# Patient Record
Sex: MALE | Race: WHITE | NOT HISPANIC OR LATINO | Employment: FULL TIME | URBAN - METROPOLITAN AREA
[De-identification: names, ages, dates, MRNs, and addresses within clinical notes are randomized per-mention and may not be internally consistent; named-entity substitution may affect disease eponyms.]

---

## 2017-02-24 ENCOUNTER — ALLSCRIPTS OFFICE VISIT (OUTPATIENT)
Dept: OTHER | Facility: OTHER | Age: 42
End: 2017-02-24

## 2017-02-24 LAB
FLUAV AG SPEC QL IA: NEGATIVE
INFLUENZA B AG (HISTORICAL): NEGATIVE

## 2017-03-07 ENCOUNTER — TRANSCRIBE ORDERS (OUTPATIENT)
Dept: LAB | Facility: CLINIC | Age: 42
End: 2017-03-07

## 2017-03-07 ENCOUNTER — APPOINTMENT (OUTPATIENT)
Dept: LAB | Facility: CLINIC | Age: 42
End: 2017-03-07
Payer: COMMERCIAL

## 2017-03-07 ENCOUNTER — ALLSCRIPTS OFFICE VISIT (OUTPATIENT)
Dept: OTHER | Facility: OTHER | Age: 42
End: 2017-03-07

## 2017-03-07 DIAGNOSIS — Z02.1 ENCOUNTER FOR PRE-EMPLOYMENT EXAMINATION: Primary | ICD-10-CM

## 2017-03-07 DIAGNOSIS — Z02.1 ENCOUNTER FOR PRE-EMPLOYMENT EXAMINATION: ICD-10-CM

## 2017-03-07 DIAGNOSIS — F31.9 BIPOLAR I DISORDER, MOST RECENT EPISODE (OR CURRENT) UNSPECIFIED: Primary | ICD-10-CM

## 2017-03-07 DIAGNOSIS — F31.9 BIPOLAR I DISORDER, MOST RECENT EPISODE (OR CURRENT) UNSPECIFIED: ICD-10-CM

## 2017-03-07 LAB
ALBUMIN SERPL BCP-MCNC: 4.1 G/DL (ref 3.5–5)
ALP SERPL-CCNC: 53 U/L (ref 46–116)
ALT SERPL W P-5'-P-CCNC: 27 U/L (ref 12–78)
ANION GAP SERPL CALCULATED.3IONS-SCNC: 6 MMOL/L (ref 4–13)
AST SERPL W P-5'-P-CCNC: 19 U/L (ref 5–45)
BASOPHILS # BLD AUTO: 0.03 THOUSANDS/ΜL (ref 0–0.1)
BASOPHILS NFR BLD AUTO: 1 % (ref 0–1)
BILIRUB SERPL-MCNC: 0.87 MG/DL (ref 0.2–1)
BUN SERPL-MCNC: 12 MG/DL (ref 5–25)
CALCIUM SERPL-MCNC: 9.3 MG/DL (ref 8.3–10.1)
CHLORIDE SERPL-SCNC: 105 MMOL/L (ref 100–108)
CO2 SERPL-SCNC: 31 MMOL/L (ref 21–32)
CREAT SERPL-MCNC: 0.96 MG/DL (ref 0.6–1.3)
EOSINOPHIL # BLD AUTO: 0.03 THOUSAND/ΜL (ref 0–0.61)
EOSINOPHIL NFR BLD AUTO: 1 % (ref 0–6)
ERYTHROCYTE [DISTWIDTH] IN BLOOD BY AUTOMATED COUNT: 13.7 % (ref 11.6–15.1)
GFR SERPL CREATININE-BSD FRML MDRD: >60 ML/MIN/1.73SQ M
GLUCOSE SERPL-MCNC: 87 MG/DL (ref 65–140)
HCT VFR BLD AUTO: 45.7 % (ref 36.5–49.3)
HGB BLD-MCNC: 15.4 G/DL (ref 12–17)
LYMPHOCYTES # BLD AUTO: 2.41 THOUSANDS/ΜL (ref 0.6–4.47)
LYMPHOCYTES NFR BLD AUTO: 52 % (ref 14–44)
MCH RBC QN AUTO: 30.6 PG (ref 26.8–34.3)
MCHC RBC AUTO-ENTMCNC: 33.7 G/DL (ref 31.4–37.4)
MCV RBC AUTO: 91 FL (ref 82–98)
MONOCYTES # BLD AUTO: 0.32 THOUSAND/ΜL (ref 0.17–1.22)
MONOCYTES NFR BLD AUTO: 7 % (ref 4–12)
NEUTROPHILS # BLD AUTO: 1.8 THOUSANDS/ΜL (ref 1.85–7.62)
NEUTS SEG NFR BLD AUTO: 39 % (ref 43–75)
NRBC BLD AUTO-RTO: 0 /100 WBCS
PLATELET # BLD AUTO: 313 THOUSANDS/UL (ref 149–390)
PMV BLD AUTO: 10.1 FL (ref 8.9–12.7)
POTASSIUM SERPL-SCNC: 4.3 MMOL/L (ref 3.5–5.3)
PROT SERPL-MCNC: 7.4 G/DL (ref 6.4–8.2)
RBC # BLD AUTO: 5.03 MILLION/UL (ref 3.88–5.62)
RUBV IGG SERPL IA-ACNC: 9.3 IU/ML
SODIUM SERPL-SCNC: 142 MMOL/L (ref 136–145)
TSH SERPL DL<=0.05 MIU/L-ACNC: 0.81 UIU/ML (ref 0.36–3.74)
WBC # BLD AUTO: 4.6 THOUSAND/UL (ref 4.31–10.16)

## 2017-03-07 PROCEDURE — 80165 DIPROPYLACETIC ACID FREE: CPT

## 2017-03-07 PROCEDURE — 86762 RUBELLA ANTIBODY: CPT

## 2017-03-07 PROCEDURE — 85025 COMPLETE CBC W/AUTO DIFF WBC: CPT

## 2017-03-07 PROCEDURE — 86706 HEP B SURFACE ANTIBODY: CPT

## 2017-03-07 PROCEDURE — 86765 RUBEOLA ANTIBODY: CPT | Performed by: FAMILY MEDICINE

## 2017-03-07 PROCEDURE — 86735 MUMPS ANTIBODY: CPT

## 2017-03-07 PROCEDURE — 86787 VARICELLA-ZOSTER ANTIBODY: CPT

## 2017-03-07 PROCEDURE — 36415 COLL VENOUS BLD VENIPUNCTURE: CPT

## 2017-03-07 PROCEDURE — 80053 COMPREHEN METABOLIC PANEL: CPT

## 2017-03-07 PROCEDURE — 84443 ASSAY THYROID STIM HORMONE: CPT

## 2017-03-08 LAB — HBV SURFACE AB SER-ACNC: 931.11 MIU/ML

## 2017-03-09 LAB
MUV IGG SER QL: NORMAL
VZV IGG SER IA-ACNC: NORMAL

## 2017-03-10 LAB — VALPROATE FREE SERPL-MCNC: 5.7 UG/ML (ref 6–22)

## 2017-03-16 LAB — MEV IGG SER QL: NORMAL

## 2017-08-31 ENCOUNTER — ALLSCRIPTS OFFICE VISIT (OUTPATIENT)
Dept: OTHER | Facility: OTHER | Age: 42
End: 2017-08-31

## 2017-08-31 DIAGNOSIS — E78.00 PURE HYPERCHOLESTEROLEMIA: ICD-10-CM

## 2017-08-31 DIAGNOSIS — I10 ESSENTIAL (PRIMARY) HYPERTENSION: ICD-10-CM

## 2018-01-13 VITALS
HEIGHT: 75 IN | WEIGHT: 215.25 LBS | SYSTOLIC BLOOD PRESSURE: 120 MMHG | OXYGEN SATURATION: 99 % | BODY MASS INDEX: 26.76 KG/M2 | DIASTOLIC BLOOD PRESSURE: 78 MMHG | HEART RATE: 60 BPM | RESPIRATION RATE: 17 BRPM | TEMPERATURE: 96.4 F

## 2018-01-13 VITALS
WEIGHT: 215 LBS | DIASTOLIC BLOOD PRESSURE: 80 MMHG | BODY MASS INDEX: 26.73 KG/M2 | SYSTOLIC BLOOD PRESSURE: 138 MMHG | OXYGEN SATURATION: 97 % | HEIGHT: 75 IN | TEMPERATURE: 99.3 F | HEART RATE: 67 BPM

## 2018-01-15 NOTE — PROGRESS NOTES
Assessment    1  Physical exam, pre-employment (V70 5) (Z02 1)   2  Screening for tuberculosis (V74 1) (Z11 1)    Plan  Cough, Fever, Myalgia    · Amoxicillin-Pot Clavulanate 875-125 MG Oral Tablet   · Oseltamivir Phosphate 75 MG Oral Capsule (Tamiflu)  Health Maintenance    · SCREEN AUDIOGRAM- POC; Status:Active - Perform Order; Requested GPO:22YAL7110;    · SNELLEN VISION- POC; Status:Active - Perform Order; Requested for:07Mar2017;   Physical exam, pre-employment    · (1) HEP B SURFACE ANTIBODY; Status:Complete;   Done: 92THJ7981 12:47PM   · (1) MUMPS  ANTIBODIES, IGG; Status:Complete;   Done: 43DBC8256 12:47PM   · (1) RUBELLA IMMUNITY; Status:Complete;   Done: 32YPZ5422 12:47PM   · (1) VARICELLA ZOSTER IMMUNITY(IGG); Status:Complete;   Done: 06GIF1980  12:47PM   · (LC) Measles/Mumps/Rubella Immunity; Status:Active; Requested for:07Mar2017;    · (Q) MEASLES ANTIBODY (IGG); Status:Active; Requested for:07Mar2017;   Screening for tuberculosis    · PPD    Discussion/Summary  Impression: health maintenance visit  Currently, he eats a healthy diet and has an adequate exercise regimen  Prostate cancer screening: PSA is not indicated  Testicular cancer screening: monthly self testicular exam was advised  Colorectal cancer screening: colorectal cancer screening is not indicated  Patient discussion: discussed with the patient  Chief Complaint  PT is here for a work physical       History of Present Illness  HM, Adult Male: The patient is being seen for a health maintenance evaluation  General Health: He denies vision problems  He denies hearing loss  Lifestyle:  He consumes a diverse and healthy diet  He does not have any weight concerns  He exercises regularly  He does not use tobacco  He consumes alcohol  He reports being in recovery from alcohol abuse  He uses illicit drugs  He has previously used illicit drugs  Drug(s) abuse includes oral opioids     Screening:   HPI: 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 , leaves this weekend, so needs titers drawn ASA, "all my immunization records are at marital home and my ex won't let me get the records"  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 [facility]      Review of Systems    Constitutional: No fever or chills, feels well, no tiredness, no recent weight gain or weight loss  Eyes: No complaints of eye pain, no red eyes, no discharge from eyes, no itchy eyes  ENT: no complaints of earache, no hearing loss, no nosebleeds, no nasal discharge, no sore throat, no hoarseness  Cardiovascular: No complaints of slow heart rate, no fast heart rate, no chest pain, no palpitations, no leg claudication, no lower extremity  Respiratory: No complaints of shortness of breath, no wheezing, no cough, no SOB on exertion, no orthopnea or PND  Gastrointestinal: No complaints of abdominal pain, no constipation, no nausea or vomiting, no diarrhea or bloody stools  Genitourinary: No complaints of dysuria, no incontinence, no hesitancy, no nocturia, no genital lesion, no testicular pain  Musculoskeletal: No complaints of arthralgia, no myalgias, no joint swelling or stiffness, no limb pain or swelling  Integumentary: No complaints of skin rash or skin lesions, no itching, no skin wound, no dry skin  Neurological: No compliants of headache, no confusion, no convulsions, no numbness or tingling, no dizziness or fainting, no limb weakness, no difficulty walking  Psychiatric: Is not suicidal, no sleep disturbances, no anxiety or depression, no change in personality, no emotional problems  Endocrine: No complaints of proptosis, no hot flashes, no muscle weakness, no erectile dysfunction, no deepening of the voice, no feelings of weakness  Hematologic/Lymphatic: No complaints of swollen glands, no swollen glands in the neck, does not bleed easily, no easy bruising  Active Problems    1   Alcoholism in recovery (303 90) (F10 20)   2  Anxiety (300 00) (F41 9)   3  Asthma, currently inactive (493 90) (J45 909)   4  Bipolar disorder (296 80) (F31 9)   5  Cough (786 2) (R05)   6  Depression (311) (F32 9)   7  Essential hypertension (401 9) (I10)   8  Fatigue (780 79) (R53 83)   9  Fever (780 60) (R50 9)   10  Gastroesophageal reflux disease (530 81) (K21 9)   11  Hypercholesterolemia (272 0) (E78 00)   12  Insomnia (780 52) (G47 00)   13  Myalgia (729 1) (M79 1)   14  Opioid dependence on agonist therapy (304 00) (F11 20)   15   PTSD (post-traumatic stress disorder) (309 81) (F43 10)    Past Medical History    · History of Cough (786 2) (R05)   · History of Hand fracture (815 00) (S62 90XA)   · History of Heavy alcohol consumption (305 00) (Z72 89)   · History of chicken pox (V12 09) (Z86 19)   · History of fracture of foot (V15 51) (Z87 81)   · History of low back pain (V13 59) (Z87 39)   · History of viral meningitis (V12 42) (Z86 61)   · History of Memory loss (780 93) (R41 3)    Surgical History    · History of Knee Arthroscopy (Therapeutic)    Family History  Mother    · Family history of hypertension (V17 49) (Z82 49)  Father    · Family history of Rectal carcinoma  Maternal Grandmother    · Family history of type 2 diabetes mellitus (V18 0) (Z83 3)  Maternal Grandfather    · Family history of type 2 diabetes mellitus (V18 0) (Z83 3)  Paternal Grandfather    · Family history of malignant neoplasm of prostate (V16 42) (Z80 45)  Family History    · Family history of arthritis (V17 7) (Z82 61)   · Family history of cardiac disorder (V17 49) (Z82 49)   · Family history of diabetes mellitus (V18 0) (Z83 3)   · Family history of hypertension (V17 49) (Z82 49)   · Family history of malignant neoplasm (V16 9) (Z80 9)   · Family history of mental disorder (V17 0) (Z81 8)   · Family history of High cholesterol    Social History    · Alcoholism in recovery (303 90) (F10 20)   · Alcoholism in recovery (303 90) (F10 20)   · Always uses seat belt   · Caffeine use (V49 89) (F15 90)   · History of Currently in treatment for drug use (305 90) (F19 90)   · Former consumption of alcohol (V11 3) (M06 352)   · Never smoked    Current Meds   1  Ambien 5 MG Oral Tablet; TAKE 1 TABLET Bedtime PRN; Therapy: (Recorded:18Nov2016) to Recorded   2  Amoxicillin-Pot Clavulanate 875-125 MG Oral Tablet; take one tablet twice daily for 10   days; Therapy: 75YKZ0874 to (Last Rx:24Feb2017)  Requested for: 24Feb2017 Ordered   3  Claritin 10 MG Oral Capsule; TAKE ONE TABLET EVERY DAY; Therapy: (Recorded:18Nov2016) to Recorded   4  Divalproex Sodium  MG Oral Tablet Extended Release 24 Hour; Therapy: 23Feb2017 to Recorded   5  Flonase 50 MCG/ACT SUSP; USE 2 SPRAYS IN EACH NOSTRIL TWICE DAILY; Therapy: (Recorded:18Nov2016) to Recorded   6  Gabapentin 800 MG Oral Tablet; TAKE 1 TABLET 3 TIMES DAILY; Therapy: (Recorded:18Nov2016) to Recorded   7  Lipitor 20 MG Oral Tablet; Take 1 tablet daily; Therapy: (Recorded:18Nov2016) to Recorded   8  Losartan Potassium 25 MG Oral Tablet; Take 1 tablet daily  Requested for: 24Feb2017;   Last Rx:24Feb2017 Ordered   9  Multiple Vitamins TABS; Therapy: (Recorded:18Nov2016) to Recorded   10  Naproxen 500 MG Oral Tablet; TAKE 1 TABLET 3 TIMES DAILY AS NEEDED; Therapy: (Recorded:18Nov2016) to Recorded   11  Niacin TABS; Therapy: (Recorded:18Nov2016) to Recorded   12  Omeprazole 20 MG Oral Capsule Delayed Release; TAKE 1 CAPSULE TWICE DAILY; Therapy: (Recorded:18Nov2016) to Recorded   13  Oseltamivir Phosphate 75 MG Oral Capsule; TAKE 1 CAPSULE TWICE DAILY; Therapy: 83LJT7353 to (Evaluate:01Mar2017)  Requested for: 24Feb2017; Last    Rx:24Feb2017 Ordered   14  ProAir  (90 Base) MCG/ACT Inhalation Aerosol Solution; INHALE 1 PUFF    EVERY 4 HOURS AS NEEDED; Therapy: 17CTJ1560 to (Last Rx:24Feb2017)  Requested for: 24Feb2017 Ordered   15  Zinc 40 MG TABS;     Therapy: (Recorded:18Nov2016) to Recorded    Allergies    1  No Known Drug Allergies    2  Animal dander - Cats   3  Seasonal    Vitals   Recorded: 20RCH4867 10:06AM   Temperature 96 4 F   Heart Rate 60   Respiration 17   Systolic 370   Diastolic 78   Height 6 ft 3 in   Weight 215 lb 4 oz   BMI Calculated 26 9   BSA Calculated 2 26   O2 Saturation 99     Physical Exam    Constitutional   General appearance: No acute distress, well appearing and well nourished  Head and Face   Head and face: Normal     Palpation of the face and sinuses: No sinus tenderness  Eyes   Conjunctiva and lids: No erythema, swelling or discharge  Pupils and irises: Equal, round, reactive to light  Ears, Nose, Mouth, and Throat   External inspection of ears and nose: Normal     Otoscopic examination: Tympanic membranes translucent with normal light reflex  Canals patent without erythema  Hearing: Normal     Nasal mucosa, septum, and turbinates: Normal without edema or erythema  Lips, teeth, and gums: Normal, good dentition  Oropharynx: Normal with no erythema, edema, exudate or lesions  Neck   Neck: Supple, symmetric, trachea midline, no masses  Thyroid: Normal, no thyromegaly  Pulmonary   Respiratory effort: No increased work of breathing or signs of respiratory distress  Percussion of chest: Normal     Auscultation of lungs: Clear to auscultation  Cardiovascular   Auscultation of heart: Normal rate and rhythm, normal S1 and S2, no murmurs  Carotid pulses: 2+ bilaterally  Abdominal aorta: Normal     Femoral pulses: 2+ bilaterally  Pedal pulses: 2+ bilaterally  Peripheral vascular exam: Normal     Examination of extremities for edema and/or varicosities: Normal     Chest   Chest: Normal     Abdomen   Abdomen: Non-tender, no masses  Liver and spleen: No hepatomegaly or splenomegaly  Examination for hernias: No hernias appreciated  No ventral hernia was palpated  No umbilical hernia was palpated     Lymphatic   Palpation of lymph nodes in neck: No lymphadenopathy  Palpation of lymph nodes in axillae: No lymphadenopathy  Palpation of lymph nodes in other areas: No lymphadenopathy  Musculoskeletal   Gait and station: Normal     Inspection/palpation of digits and nails: Normal without clubbing or cyanosis  Inspection/palpation of joints, bones, and muscles: Normal     Range of motion: Normal     Stability: Normal     Muscle strength/tone: Normal     Skin   Skin and subcutaneous tissue: Normal without rashes or lesions  Palpation of skin and subcutaneous tissue: Normal turgor  Neurologic   Cranial nerves: Cranial nerves 2-12 intact  Cortical function: Normal mental status  Reflexes: 2+ and symmetric  Sensation: No sensory loss  Coordination: Normal finger to nose and heel to shin  Psychiatric   Judgment and insight: Normal     Orientation to person, place and time: Normal     Mood and affect: Normal   Mood and Affect: appropriate mood and sad  Results/Data  (1) VARICELLA ZOSTER IMMUNITY(IGG) 35DZT9018 12:47PM Manasa Banda Order Number: [id number]     Test Name Result Flag Reference   SANCHO ZOSTER IGG IMMUNE  IMMUNE   INTERPRETATION:    IMMUNE     - Presumed immune to VZV IgG infection  EQUIVOCAL  - Suggest repeat testing in 2-4 weeks  NON-IMMUNE - Presumed non-immune to VZV IgG infection  Procedure    Procedure: Hearing Acuity Test    Indication: Routine screeing  Audiometry:   Hearing in the right ear: 25 decibals at 500 hertz, 25 decibals at 1000 hertz, 25 decibals at 2000 hertz and 25 decibals at 4000 hertz  Hearing in the left ear: 40 decibals at 500 hertz, 40 decibals at 1000 hertz, 40 decibals at 2000 hertz and 40 decibals at 4000 hertz  Procedure: Visual Acuity Test    Indication: routine screening  Inforrmation supplied by att a Snellen chart     Results: 20/25 in the right eye without corrective device, 20/25 in the left eye without corrective device normal in both eyes        Signatures   Electronically signed by : Sruthi Terrell DO; Mar 15 2017  8:38AM EST                       (Author)

## 2018-02-15 DIAGNOSIS — J30.9 CHRONIC ALLERGIC RHINITIS, UNSPECIFIED SEASONALITY, UNSPECIFIED TRIGGER: Primary | ICD-10-CM

## 2018-02-15 RX ORDER — FLUTICASONE PROPIONATE 50 MCG
2 SPRAY, SUSPENSION (ML) NASAL DAILY
Qty: 16 G | Refills: 0 | Status: SHIPPED | OUTPATIENT
Start: 2018-02-15 | End: 2018-03-09 | Stop reason: SDUPTHER

## 2018-02-15 RX ORDER — FLUTICASONE PROPIONATE 50 MCG
2 SPRAY, SUSPENSION (ML) NASAL DAILY
COMMUNITY
End: 2018-02-15 | Stop reason: SDUPTHER

## 2018-03-08 RX ORDER — MULTIVITAMIN
TABLET ORAL
COMMUNITY

## 2018-03-08 RX ORDER — OMEPRAZOLE 20 MG/1
1 CAPSULE, DELAYED RELEASE ORAL
COMMUNITY
End: 2018-06-12 | Stop reason: SDUPTHER

## 2018-03-08 RX ORDER — NAPROXEN 500 MG/1
1 TABLET ORAL 3 TIMES DAILY PRN
COMMUNITY

## 2018-03-08 RX ORDER — GABAPENTIN 800 MG/1
1 TABLET ORAL 3 TIMES DAILY
COMMUNITY

## 2018-03-08 RX ORDER — ALBUTEROL SULFATE 90 UG/1
1 AEROSOL, METERED RESPIRATORY (INHALATION) EVERY 4 HOURS PRN
COMMUNITY
Start: 2016-11-18 | End: 2018-03-09 | Stop reason: SDUPTHER

## 2018-03-08 RX ORDER — DIVALPROEX SODIUM 500 MG/1
TABLET, EXTENDED RELEASE ORAL
COMMUNITY
Start: 2017-02-23

## 2018-03-08 RX ORDER — ATORVASTATIN CALCIUM 20 MG/1
1 TABLET, FILM COATED ORAL DAILY
COMMUNITY
Start: 2017-08-06

## 2018-03-08 RX ORDER — LOSARTAN POTASSIUM 25 MG/1
1 TABLET ORAL DAILY
COMMUNITY
Start: 2017-08-06

## 2018-03-08 RX ORDER — ZOLPIDEM TARTRATE 5 MG/1
1 TABLET ORAL
COMMUNITY
End: 2018-03-09 | Stop reason: SDUPTHER

## 2018-03-08 RX ORDER — NIACIN 100 MG
TABLET ORAL
COMMUNITY

## 2018-03-08 RX ORDER — LORATADINE 10 MG/1
1 CAPSULE, LIQUID FILLED ORAL DAILY
COMMUNITY
End: 2018-03-09 | Stop reason: SDUPTHER

## 2018-03-09 ENCOUNTER — APPOINTMENT (OUTPATIENT)
Dept: LAB | Facility: CLINIC | Age: 43
End: 2018-03-09
Payer: COMMERCIAL

## 2018-03-09 ENCOUNTER — TRANSCRIBE ORDERS (OUTPATIENT)
Dept: LAB | Facility: CLINIC | Age: 43
End: 2018-03-09

## 2018-03-09 ENCOUNTER — OFFICE VISIT (OUTPATIENT)
Dept: FAMILY MEDICINE CLINIC | Facility: CLINIC | Age: 43
End: 2018-03-09
Payer: COMMERCIAL

## 2018-03-09 VITALS
HEART RATE: 66 BPM | OXYGEN SATURATION: 98 % | BODY MASS INDEX: 27.38 KG/M2 | WEIGHT: 224.8 LBS | HEIGHT: 76 IN | SYSTOLIC BLOOD PRESSURE: 106 MMHG | DIASTOLIC BLOOD PRESSURE: 74 MMHG | TEMPERATURE: 98.3 F

## 2018-03-09 DIAGNOSIS — E78.00 PURE HYPERCHOLESTEROLEMIA: ICD-10-CM

## 2018-03-09 DIAGNOSIS — I10 ESSENTIAL (PRIMARY) HYPERTENSION: ICD-10-CM

## 2018-03-09 DIAGNOSIS — G47.00 INSOMNIA, UNSPECIFIED TYPE: Primary | ICD-10-CM

## 2018-03-09 DIAGNOSIS — J30.9 CHRONIC ALLERGIC RHINITIS, UNSPECIFIED SEASONALITY, UNSPECIFIED TRIGGER: ICD-10-CM

## 2018-03-09 DIAGNOSIS — J45.21 MILD INTERMITTENT ASTHMATIC BRONCHITIS WITH ACUTE EXACERBATION: ICD-10-CM

## 2018-03-09 LAB
ALBUMIN SERPL BCP-MCNC: 3.8 G/DL (ref 3.5–5)
ALP SERPL-CCNC: 47 U/L (ref 46–116)
ALT SERPL W P-5'-P-CCNC: 16 U/L (ref 12–78)
ANION GAP SERPL CALCULATED.3IONS-SCNC: 6 MMOL/L (ref 4–13)
AST SERPL W P-5'-P-CCNC: 16 U/L (ref 5–45)
BASOPHILS # BLD AUTO: 0.01 THOUSANDS/ΜL (ref 0–0.1)
BASOPHILS NFR BLD AUTO: 0 % (ref 0–1)
BILIRUB SERPL-MCNC: 0.82 MG/DL (ref 0.2–1)
BUN SERPL-MCNC: 10 MG/DL (ref 5–25)
CALCIUM SERPL-MCNC: 9.2 MG/DL (ref 8.3–10.1)
CHLORIDE SERPL-SCNC: 104 MMOL/L (ref 100–108)
CHOLEST SERPL-MCNC: 173 MG/DL (ref 50–200)
CO2 SERPL-SCNC: 29 MMOL/L (ref 21–32)
CREAT SERPL-MCNC: 0.82 MG/DL (ref 0.6–1.3)
CREAT UR-MCNC: 190 MG/DL
EOSINOPHIL # BLD AUTO: 0.05 THOUSAND/ΜL (ref 0–0.61)
EOSINOPHIL NFR BLD AUTO: 1 % (ref 0–6)
ERYTHROCYTE [DISTWIDTH] IN BLOOD BY AUTOMATED COUNT: 12.5 % (ref 11.6–15.1)
GFR SERPL CREATININE-BSD FRML MDRD: 109 ML/MIN/1.73SQ M
GLUCOSE P FAST SERPL-MCNC: 99 MG/DL (ref 65–99)
HCT VFR BLD AUTO: 45.1 % (ref 36.5–49.3)
HDLC SERPL-MCNC: 47 MG/DL (ref 40–60)
HGB BLD-MCNC: 16 G/DL (ref 12–17)
LDLC SERPL CALC-MCNC: 113 MG/DL (ref 0–100)
LYMPHOCYTES # BLD AUTO: 2.21 THOUSANDS/ΜL (ref 0.6–4.47)
LYMPHOCYTES NFR BLD AUTO: 46 % (ref 14–44)
MCH RBC QN AUTO: 31.3 PG (ref 26.8–34.3)
MCHC RBC AUTO-ENTMCNC: 35.5 G/DL (ref 31.4–37.4)
MCV RBC AUTO: 88 FL (ref 82–98)
MICROALBUMIN UR-MCNC: 10.3 MG/L (ref 0–20)
MICROALBUMIN/CREAT 24H UR: 5 MG/G CREATININE (ref 0–30)
MONOCYTES # BLD AUTO: 0.3 THOUSAND/ΜL (ref 0.17–1.22)
MONOCYTES NFR BLD AUTO: 6 % (ref 4–12)
NEUTROPHILS # BLD AUTO: 2.22 THOUSANDS/ΜL (ref 1.85–7.62)
NEUTS SEG NFR BLD AUTO: 47 % (ref 43–75)
NRBC BLD AUTO-RTO: 0 /100 WBCS
PLATELET # BLD AUTO: 281 THOUSANDS/UL (ref 149–390)
PMV BLD AUTO: 9.9 FL (ref 8.9–12.7)
POTASSIUM SERPL-SCNC: 3.7 MMOL/L (ref 3.5–5.3)
PROT SERPL-MCNC: 7.5 G/DL (ref 6.4–8.2)
RBC # BLD AUTO: 5.11 MILLION/UL (ref 3.88–5.62)
SODIUM SERPL-SCNC: 139 MMOL/L (ref 136–145)
T4 FREE SERPL-MCNC: 1.02 NG/DL (ref 0.76–1.46)
TRIGL SERPL-MCNC: 67 MG/DL
TSH SERPL DL<=0.05 MIU/L-ACNC: 0.22 UIU/ML (ref 0.36–3.74)
WBC # BLD AUTO: 4.8 THOUSAND/UL (ref 4.31–10.16)

## 2018-03-09 PROCEDURE — 99214 OFFICE O/P EST MOD 30 MIN: CPT | Performed by: FAMILY MEDICINE

## 2018-03-09 PROCEDURE — 80053 COMPREHEN METABOLIC PANEL: CPT

## 2018-03-09 PROCEDURE — 84443 ASSAY THYROID STIM HORMONE: CPT

## 2018-03-09 PROCEDURE — 84439 ASSAY OF FREE THYROXINE: CPT

## 2018-03-09 PROCEDURE — 80061 LIPID PANEL: CPT

## 2018-03-09 PROCEDURE — 1036F TOBACCO NON-USER: CPT | Performed by: FAMILY MEDICINE

## 2018-03-09 PROCEDURE — 36415 COLL VENOUS BLD VENIPUNCTURE: CPT

## 2018-03-09 PROCEDURE — 85025 COMPLETE CBC W/AUTO DIFF WBC: CPT

## 2018-03-09 PROCEDURE — 3008F BODY MASS INDEX DOCD: CPT | Performed by: FAMILY MEDICINE

## 2018-03-09 PROCEDURE — 82043 UR ALBUMIN QUANTITATIVE: CPT

## 2018-03-09 PROCEDURE — 82570 ASSAY OF URINE CREATININE: CPT

## 2018-03-09 RX ORDER — AZITHROMYCIN 250 MG/1
TABLET, FILM COATED ORAL
Qty: 6 TABLET | Refills: 0 | Status: SHIPPED | OUTPATIENT
Start: 2018-03-09 | End: 2018-03-13

## 2018-03-09 RX ORDER — ALBUTEROL SULFATE 90 UG/1
1 AEROSOL, METERED RESPIRATORY (INHALATION) EVERY 4 HOURS PRN
Qty: 1 INHALER | Refills: 0 | Status: SHIPPED | OUTPATIENT
Start: 2018-03-09

## 2018-03-09 RX ORDER — ZOLPIDEM TARTRATE 5 MG/1
5 TABLET ORAL
Qty: 30 TABLET | Refills: 0 | Status: SHIPPED | OUTPATIENT
Start: 2018-03-09 | End: 2018-06-12 | Stop reason: SDUPTHER

## 2018-03-09 RX ORDER — FLUTICASONE PROPIONATE 50 MCG
2 SPRAY, SUSPENSION (ML) NASAL DAILY
Qty: 16 G | Refills: 2 | Status: SHIPPED | OUTPATIENT
Start: 2018-03-09 | End: 2018-06-12 | Stop reason: SDUPTHER

## 2018-03-09 RX ORDER — LORATADINE 10 MG/1
10 CAPSULE, LIQUID FILLED ORAL DAILY
Qty: 90 CAPSULE | Refills: 0 | Status: SHIPPED | OUTPATIENT
Start: 2018-03-09

## 2018-03-09 NOTE — PROGRESS NOTES
Assessment/Plan:         Diagnoses and all orders for this visit:    Insomnia, unspecified type  -     zolpidem (AMBIEN) 5 mg tablet; Take 1 tablet (5 mg total) by mouth daily at bedtime as needed for sleep    Chronic allergic rhinitis, unspecified seasonality, unspecified trigger  -     Loratadine (CLARITIN) 10 MG CAPS; Take 1 capsule (10 mg total) by mouth daily  -     fluticasone (FLONASE) 50 mcg/act nasal spray; 2 sprays into each nostril daily    Mild intermittent asthmatic bronchitis with acute exacerbation  -     albuterol (PROAIR HFA) 90 mcg/act inhaler; Inhale 1 puff every 4 (four) hours as needed for wheezing or shortness of breath  -     azithromycin (ZITHROMAX) 250 mg tablet; 2 PO day 1, then 1 PO qd x 4 days  -     XR chest pa & lateral; Future          Subjective:   Chief Complaint   Patient presents with    Headache     symptoms started about 2 weeks ago    Nasal Congestion    Cough    Diarrhea    Flank Pain    Generalized Body Aches        Patient ID: Jose Alberto Arce is a 43 y o  male  Per c/c reviewed by me  Used cold-eez at first, then theraflu, nothing working, pushing 2 weeks now, and getting worse, not better  stayed home today from work due to [employer]  Possible ill contacts- his children  Admits feverishness  Did not get a flu shot this year, usually does  Admits with cough, "sometimes nothing produced, sometimes brown chunks, can hear it gurgling in my chest, also pain in vb/l flanks since ill, haven't been drinking much water, maybe that's it"  Used to have inhaler at home, has not needed in a while, so doesn't have one  States doesn't have health insurance, so had to stop his other meds        The following portions of the patient's history were reviewed and updated as appropriate: allergies, current medications, past family history, past medical history, past social history, past surgical history and problem list     Review of Systems   Constitutional: Positive for fatigue     Eyes: Negative  Respiratory: Negative for choking, chest tightness, shortness of breath, wheezing and stridor  Cardiovascular: Negative  Genitourinary: Negative  Skin: Negative  Neurological: Negative for dizziness, tremors, syncope, facial asymmetry, speech difficulty, weakness, light-headedness and numbness  Hematological: Negative  Objective:      /74 (BP Location: Left arm, Patient Position: Sitting, Cuff Size: Large)   Pulse 66   Temp 98 3 °F (36 8 °C) (Tympanic)   Ht 6' 4" (1 93 m)   Wt 102 kg (224 lb 12 8 oz)   SpO2 98%   BMI 27 36 kg/m²          Physical Exam   Constitutional: He appears well-developed and well-nourished  He is cooperative  Non-toxic appearance  He does not have a sickly appearance  He does not appear ill  No distress  HENT:   Head: Normocephalic and atraumatic  Right Ear: Tympanic membrane, external ear and ear canal normal    Left Ear: Tympanic membrane, external ear and ear canal normal    Nose: Rhinorrhea present  No sinus tenderness  Right sinus exhibits no maxillary sinus tenderness and no frontal sinus tenderness  Left sinus exhibits no maxillary sinus tenderness and no frontal sinus tenderness  Mouth/Throat: Uvula is midline, oropharynx is clear and moist and mucous membranes are normal    Neck: Trachea normal  Neck supple  No thyroid mass and no thyromegaly present  Cardiovascular: Normal rate, regular rhythm, normal heart sounds and normal pulses  Pulmonary/Chest: Effort normal and breath sounds normal    Lymphadenopathy:     He has cervical adenopathy  Right cervical: No superficial cervical adenopathy present  Left cervical: Superficial cervical adenopathy present  Neurological: He is alert  Skin: Skin is warm  He is not diaphoretic  Nursing note and vitals reviewed

## 2018-03-15 DIAGNOSIS — E05.90 HYPERTHYROIDISM: Primary | ICD-10-CM

## 2018-06-12 DIAGNOSIS — K21.9 GASTROESOPHAGEAL REFLUX DISEASE, ESOPHAGITIS PRESENCE NOT SPECIFIED: ICD-10-CM

## 2018-06-12 DIAGNOSIS — G47.00 INSOMNIA, UNSPECIFIED TYPE: ICD-10-CM

## 2018-06-12 DIAGNOSIS — J30.9 ALLERGIC RHINITIS, UNSPECIFIED SEASONALITY, UNSPECIFIED TRIGGER: Primary | ICD-10-CM

## 2018-06-12 NOTE — TELEPHONE ENCOUNTER
Patient left a message requesting refills of flonase, prevacid, ambien  No pharmacy given  He asks for these refills for one month, until he gets in with his new doctor in [place], who will then take over, he states  He also stated that he is moving to [place] and has requested that his medical records be sent to Dr Ruiz Miner, [address] [place] [address],[address], [place], [place]  Phone: [phone]  Fax: [phone]  I will sent the last office note plus medication list via fax and speak with the patient about a medical release being signed for all other records  Thank you

## 2018-06-12 NOTE — TELEPHONE ENCOUNTER
Left message for the patient advising about needing medical records release, asking for his new address to update our records, and to confirm pharmacy being [facility] in [facility] for his refills

## 2018-06-12 NOTE — TELEPHONE ENCOUNTER
Patient called back requesting his prescriptions go to [facility], [address] [place] [address], [address], [address]  Phone: [phone]  Thank you  His first appointment with Dr Maury Rodriguez is 6/25/2018

## 2018-06-15 RX ORDER — FLUTICASONE PROPIONATE 50 MCG
2 SPRAY, SUSPENSION (ML) NASAL DAILY
Qty: 16 G | Refills: 0 | Status: SHIPPED | OUTPATIENT
Start: 2018-06-15

## 2018-06-15 RX ORDER — ZOLPIDEM TARTRATE 5 MG/1
5 TABLET ORAL
Qty: 30 TABLET | Refills: 0 | Status: SHIPPED | OUTPATIENT
Start: 2018-06-15

## 2018-06-15 RX ORDER — OMEPRAZOLE 20 MG/1
20 CAPSULE, DELAYED RELEASE ORAL DAILY
Qty: 30 CAPSULE | Refills: 0 | Status: SHIPPED | OUTPATIENT
Start: 2018-06-15

## 2023-02-14 ENCOUNTER — HOSPITAL ENCOUNTER (OUTPATIENT)
Dept: ULTRASOUND IMAGING | Facility: HOSPITAL | Age: 48
Discharge: HOME | End: 2023-02-14

## 2023-02-14 ENCOUNTER — APPOINTMENT (OUTPATIENT)
Dept: LAB | Facility: HOSPITAL | Age: 48
End: 2023-02-14

## 2023-02-14 DIAGNOSIS — F10.239 ALCOHOL DEPENDENCE WITH WITHDRAWAL WITH COMPLICATION (HCC): ICD-10-CM

## 2023-02-14 DIAGNOSIS — K76.0 FATTY LIVER: ICD-10-CM

## 2023-02-14 LAB
ALBUMIN SERPL BCP-MCNC: 4.5 G/DL (ref 3.5–5)
ALP SERPL-CCNC: 42 U/L (ref 34–104)
ALT SERPL W P-5'-P-CCNC: 143 U/L (ref 7–52)
ANION GAP SERPL CALCULATED.3IONS-SCNC: 7 MMOL/L (ref 4–13)
AST SERPL W P-5'-P-CCNC: 105 U/L (ref 13–39)
BASOPHILS # BLD AUTO: 0.05 THOUSANDS/ΜL (ref 0–0.1)
BASOPHILS NFR BLD AUTO: 1 % (ref 0–1)
BILIRUB SERPL-MCNC: 0.92 MG/DL (ref 0.2–1)
BUN SERPL-MCNC: 16 MG/DL (ref 5–25)
CALCIUM SERPL-MCNC: 10.4 MG/DL (ref 8.4–10.2)
CHLORIDE SERPL-SCNC: 98 MMOL/L (ref 96–108)
CHOLEST SERPL-MCNC: 184 MG/DL
CO2 SERPL-SCNC: 31 MMOL/L (ref 21–32)
CREAT SERPL-MCNC: 0.98 MG/DL (ref 0.6–1.3)
EOSINOPHIL # BLD AUTO: 0.06 THOUSAND/ΜL (ref 0–0.61)
EOSINOPHIL NFR BLD AUTO: 1 % (ref 0–6)
ERYTHROCYTE [DISTWIDTH] IN BLOOD BY AUTOMATED COUNT: 12.2 % (ref 11.6–15.1)
EST. AVERAGE GLUCOSE BLD GHB EST-MCNC: 111 MG/DL
FOLATE SERPL-MCNC: 6.7 NG/ML (ref 3.1–17.5)
GFR SERPL CREATININE-BSD FRML MDRD: 91 ML/MIN/1.73SQ M
GLUCOSE P FAST SERPL-MCNC: 100 MG/DL (ref 65–99)
HBA1C MFR BLD: 5.5 %
HCT VFR BLD AUTO: 49.1 % (ref 36.5–49.3)
HDLC SERPL-MCNC: 48 MG/DL
HGB BLD-MCNC: 16.3 G/DL (ref 12–17)
IMM GRANULOCYTES # BLD AUTO: 0.01 THOUSAND/UL (ref 0–0.2)
IMM GRANULOCYTES NFR BLD AUTO: 0 % (ref 0–2)
LDLC SERPL CALC-MCNC: 122 MG/DL (ref 0–100)
LYMPHOCYTES # BLD AUTO: 1.88 THOUSANDS/ΜL (ref 0.6–4.47)
LYMPHOCYTES NFR BLD AUTO: 36 % (ref 14–44)
MCH RBC QN AUTO: 32.9 PG (ref 26.8–34.3)
MCHC RBC AUTO-ENTMCNC: 33.2 G/DL (ref 31.4–37.4)
MCV RBC AUTO: 99 FL (ref 82–98)
MONOCYTES # BLD AUTO: 0.47 THOUSAND/ΜL (ref 0.17–1.22)
MONOCYTES NFR BLD AUTO: 9 % (ref 4–12)
NEUTROPHILS # BLD AUTO: 2.73 THOUSANDS/ΜL (ref 1.85–7.62)
NEUTS SEG NFR BLD AUTO: 53 % (ref 43–75)
NRBC BLD AUTO-RTO: 0 /100 WBCS
PLATELET # BLD AUTO: 323 THOUSANDS/UL (ref 149–390)
PMV BLD AUTO: 10.2 FL (ref 8.9–12.7)
POTASSIUM SERPL-SCNC: 3.5 MMOL/L (ref 3.5–5.3)
PROT SERPL-MCNC: 7.6 G/DL (ref 6.4–8.4)
RBC # BLD AUTO: 4.96 MILLION/UL (ref 3.88–5.62)
SODIUM SERPL-SCNC: 136 MMOL/L (ref 135–147)
T4 FREE SERPL-MCNC: 1.31 NG/DL (ref 0.76–1.46)
TRIGL SERPL-MCNC: 72 MG/DL
TSH SERPL DL<=0.05 MIU/L-ACNC: 0.63 UIU/ML (ref 0.45–4.5)
WBC # BLD AUTO: 5.2 THOUSAND/UL (ref 4.31–10.16)

## 2023-02-15 LAB
C TRACH DNA SPEC QL NAA+PROBE: NEGATIVE
HCV AB S/CO SERPL IA: NON REACTIVE
HIV 1+2 AB+HIV1 P24 AG SERPL QL IA: NORMAL
HIV 2 AB SERPL QL IA: NORMAL
HIV1 AB SERPL QL IA: NORMAL
HIV1 P24 AG SERPL QL IA: NORMAL
N GONORRHOEA DNA SPEC QL NAA+PROBE: NEGATIVE
SL AMB INTERPRETATION: NORMAL
TREPONEMA PALLIDUM IGG+IGM AB [PRESENCE] IN SERUM OR PLASMA BY IMMUNOASSAY: NORMAL

## 2023-02-17 ENCOUNTER — EVALUATION (OUTPATIENT)
Dept: PHYSICAL THERAPY | Facility: CLINIC | Age: 48
End: 2023-02-17

## 2023-02-17 DIAGNOSIS — M54.16 CHRONIC LEFT-SIDED LUMBAR RADICULOPATHY: Primary | ICD-10-CM

## 2023-02-17 LAB — VIT B1 BLD-SCNC: 91.2 NMOL/L (ref 66.5–200)

## 2023-02-17 NOTE — LETTER
2023    Elgin Toledo, [address] [facility] [address]    Patient: Jose Alberto Arce   YOB: 1975   Date of Visit: 2023     Encounter Diagnosis     ICD-10-CM    1  Chronic left-sided lumbar radiculopathy  M54 16           Dear Dr Lola Rosen: Thank you for your recent referral of Jose Alberto Arce  Please review the attached evaluation summary from Bro's recent visit  Please verify that you agree with the plan of care by signing the attached order  If you have any questions or concerns, please do not hesitate to call  I sincerely appreciate the opportunity to share in the care of one of your patients and hope to have another opportunity to work with you in the near future  Sincerely,    Radha Asencio, PT      Referring Provider:      I certify that I have read the below Plan of Care and certify the need for these services furnished under this plan of treatment while under my care  Elgin Toledo PA-C  [address] [facility] [address]  Via Fax: [phone]          PT Evaluation     Today's date: 2023  Patient name: Jose Alberto Arce  : 1975  MRN: [id number]  Referring provider: Kiara Hanks*  Dx: No diagnosis found  Assessment  Assessment details: Pt is a 52 yr old male presenting to physical therapy with s/s consistent with lumbar radiculopathy down into his L LE  He is currently limited in gross hip strength, pain while weightbearing through his L LE, decreased neuromuscular control of his back muscles leading to abnormal gait pattern, and increased nerve sensitivity marked by the positive SLR  These impairments are preventing him from ascending stairs, performing ADL's I, and ambulating with a normal gait pattern  He would be a great candidate for skilled physical therapy to address these functional limitations and return him to his OF     Impairments: abnormal muscle firing and pain with function    Symptom irritability: moderateUnderstanding of Dx/Px/POC: good   Prognosis: good    Goals  STG: (4 weeks)  1  Pt will be able to perform ADL's with less than a 2/10 pain in order to regain function  2  Pt will demo understanding of his HEP  LTG (8 weeks)  1  Pt will be able to sit > 60 minutes w/o pain in order to sit for prolonged periods of time  2  Pt will demo an understanding of their pain and how to modify activity when symptoms arise due to LBP longevity/chronicity  Plan  Patient would benefit from: skilled physical therapy  Referral necessary: No  Planned therapy interventions: joint mobilization, activity modification, manual therapy, massage, ADL training, motor coordination training, neuromuscular re-education, patient education, strengthening, stretching, therapeutic activities, therapeutic exercise, therapeutic training, graded exercise and breathing training  Frequency: 2x week  Duration in weeks: 8  Plan of Care beginning date: 2023  Plan of Care expiration date: 2023  Treatment plan discussed with: patient        Subjective Evaluation    History of Present Illness  Date of onset: 2022  Mechanism of injury: Pt reported back injury in , and his L sided sciatica has been flared up down his leg  Last few months it has been getting worse from sleeping on the couch  Pt reported his home exercises he does at home haven't been helping to much  He is currently taking naproxen and is very mentally focused on his pain  Denies hx of previous surgery on back, and hasn't had imaging since that injury  Pt does report N/T in his toes and his L calf  Pt does deny saddle anesthesia and bowel/bladder changes related to his back       Goals: no pain,sitting without pain, pain free ADL's          Recurrent probem    Quality of life: fair    Pain  No pain reported  Current pain ratin  At best pain ratin  At worst pain ratin  Quality: dull ache, radiating, discomfort and burning          Objective     Tests     Lumbar     Left   Positive passive SLR  Lumbar AROM Left Right   Flexion WNL    Extension WNL, * LBP with radicular sxs into L LE     Lateral Flexion  Mildly limited, * L LBP with radicular sxs into L LE Mildly limited   Rotation     * = LBP    Spine mobility: hypomobile lower t-spine, lumbar spine    Hip screen: Perform next session  Muscle Length: Tight b/l hamstring, tested w/ 90/90 test        Manual Muscle Testing - Hip Left Right   Flexion 5 5   Extension 4 4   Abduction 4 4   External Rotation       Manual Muscle Testing - Knee Left Right   Flexion 5 5   Extension 5, *L LBP and L foot n/t  5     Manual Muscle Testing - Ankle Left Right   Doriflexion     Plantarflexion     EHL                Precautions: none  Re-eval Date: 3/17/23    Date 2/17       Visit Count        FOTO        Pain In        Pain Out                    Manuals        STM targetting b/l erectors, Grade 3-4 PA's lower lumbar spine, L sided UPA's, passive sciatic nerve glide performed                               Neuro Re-Ed                                                                 Ther Ex        Cat/camel        LTR        Bridges                                                Ther Activity                        Gait Training                        Modalities                              Attestation signed by Ariane Ureña PT at 2/21/2023 10:57 AM:  I supervised the visit  We discussed the case to ensure appropriate continuation and progression of care and I reviewed the documentation

## 2023-02-21 ENCOUNTER — APPOINTMENT (OUTPATIENT)
Dept: PHYSICAL THERAPY | Facility: CLINIC | Age: 48
End: 2023-02-21

## 2023-02-23 ENCOUNTER — OFFICE VISIT (OUTPATIENT)
Dept: PHYSICAL THERAPY | Facility: CLINIC | Age: 48
End: 2023-02-23

## 2023-02-23 DIAGNOSIS — M54.16 CHRONIC LEFT-SIDED LUMBAR RADICULOPATHY: Primary | ICD-10-CM

## 2023-02-23 NOTE — PROGRESS NOTES
Daily Note     Today's date: 2023  Patient name: Rachel Naranjo  : 1975  MRN: [id number]  Referring provider: David Frey*  Dx: No diagnosis found  Subjective: My pain went away when I slept on the couch earlier this week, but it just came back  Objective: See treatment diary below      Assessment: Tolerated treatment well  Pt presented w/ decreased mobility in his lumbar spine and stiffness in his L LE which was treated with grade 3/4 PA lumbar mobilization and STM which helped alleviate s/s  Pt increased his exercise tolerance to bridges w/ GTB, sideling clamshells, and STS w/ 15lbs to work on core stability, LE gross strength, and motor control  Continue to progress POC as tolerated  Patient demonstrated fatigue post treatment and exhibited good technique with therapeutic exercises      Plan: Continue per plan of care        Precautions: none  Re-eval Date: 3/17/23    Date       Visit Count 1 2      FOTO        Pain In        Pain Out                    Manuals        STM targetting b/l erectors, Grade 3-4 PA's lower lumbar spine, L sided UPA's, passive sciatic nerve glide performed Performed (15 min)                              Neuro Re-Ed         STS  3 x 8 w/ 15lbs      Paloff Press  2 x 10 w/ 15lbs                                              Ther Ex        Cat/camel        LTR        Bridges  GTB 3 x 10      SL clamshells  3 x 10 L sided      Gastroc  3 rounds x 30 sec                              Ther Activity                        Gait Training                        Modalities

## 2023-02-27 ENCOUNTER — OFFICE VISIT (OUTPATIENT)
Dept: PHYSICAL THERAPY | Facility: CLINIC | Age: 48
End: 2023-02-27

## 2023-02-27 DIAGNOSIS — M54.16 CHRONIC LEFT-SIDED LUMBAR RADICULOPATHY: Primary | ICD-10-CM

## 2023-02-27 NOTE — PROGRESS NOTES
Daily Note     Today's date: 2023  Patient name: Sakina Stone  : 1975  MRN: [id number]  Referring provider: Trey Pena*  Dx: No diagnosis found  Subjective: Pt reported feeling slightly better today, but does still report pain down his L LE that isn't as painful  Objective: See treatment diary below      Assessment: Tolerated treatment well  He was able to add in dead bugs today and cat/camels to work on core mobility and stability without increase patients s/s  Continue to progress POC as tolerated  Patient demonstrated fatigue post treatment and would benefit from continued PT      Plan: Continue per plan of care        Precautions: none  Re-eval Date: 3/17/23    Date      Visit Count 1 2 3     FOTO        Pain In   4     Pain Out   5                  Manuals        STM targetting b/l erectors, Grade 3-4 PA's lower lumbar spine, L sided UPA's, passive sciatic nerve glide performed Performed (15 min)                              Neuro Re-Ed         STS  3 x 8 w/ 15lbs      Paloff Press  2 x 10 w/ 15lbs      Deadlift        Lateral Step ups        Monster walks        deadbugs   x6 b/l     birddogs        Ther Ex        Cat/camel   x20     LTR        Bridges  GTB 3 x 10 GTB 3 x 10     SL clamshells  3 x 10 L sided 3 x 10 b/l     Gastroc  3 rounds x 30 sec 3 x 30' L LE                             Ther Activity                        Gait Training                        Modalities

## 2023-03-07 ENCOUNTER — APPOINTMENT (OUTPATIENT)
Dept: PHYSICAL THERAPY | Facility: CLINIC | Age: 48
End: 2023-03-07
Payer: COMMERCIAL

## 2023-03-09 ENCOUNTER — APPOINTMENT (OUTPATIENT)
Dept: PHYSICAL THERAPY | Facility: CLINIC | Age: 48
End: 2023-03-09
Payer: COMMERCIAL

## 2023-03-10 ENCOUNTER — TELEPHONE (OUTPATIENT)
Dept: OTHER | Facility: OTHER | Age: 48
End: 2023-03-10

## 2023-03-14 ENCOUNTER — APPOINTMENT (OUTPATIENT)
Dept: PHYSICAL THERAPY | Facility: CLINIC | Age: 48
End: 2023-03-14
Payer: COMMERCIAL

## 2023-03-16 ENCOUNTER — OFFICE VISIT (OUTPATIENT)
Dept: PHYSICAL THERAPY | Facility: CLINIC | Age: 48
End: 2023-03-16

## 2023-03-16 DIAGNOSIS — M54.16 CHRONIC LEFT-SIDED LUMBAR RADICULOPATHY: Primary | ICD-10-CM

## 2023-03-16 NOTE — PROGRESS NOTES
Daily Note     Today's date: 3/16/2023  Patient name: Israel Gómez  : 1975  MRN: [id number]  Referring provider: Ruby Mccormick*  Dx:   Encounter Diagnosis     ICD-10-CM    1  Chronic left-sided lumbar radiculopathy  M54 16                      Subjective: My pain is at a 7/10 today, and it's really shooting down my left leg  Objective: See treatment diary below      Assessment: Tolerated treatment well  He presented with a hypomobile lumbar spine which was treated with grade 3-4 Lumbar PA's and STM targetting b/l spinal erectors which helped decrease his pain  We try repeated movement exercises today but they increased his s/s  Trial more extension based exercises next session  Patient exhibited good technique with therapeutic exercises and would benefit from continued PT      Plan: Continue per plan of care  Precautions: none  Re-eval Date: 3/17/23    Date 2/17 2/23 2/24 3/16    Visit Count 1 2 3 4    FOTO        Pain In   4     Pain Out   5                  Manuals        STM targetting b/l erectors, Grade 3-4 PA's lower lumbar spine, L sided UPA's, passive sciatic nerve glide performed Performed (15 min)  Performed (15 min)                             Neuro Re-Ed         STS  3 x 8 w/ 15lbs      Paloff Press  2 x 10 w/ 15lbs      Deadlift        Lateral Step ups        Monster walks        deadbugs   x6 b/l Supine SLR focusing on TVA 2x 10 b/l      birddogs        Repeated motion    FLX x 12, increased s/s  Ther Ex        Cat/camel   x20 2 x 20     LTR        Bridges  GTB 3 x 10 GTB 3 x 10     SL clamshells  3 x 10 L sided 3 x 10 b/l     Gastroc  3 rounds x 30 sec 3 x 30' L LE     RCB    L3, 10 min for CV endurance and back mobility  maynor pose    2 x 10            Ther Activity                        Gait Training                        Modalities                        Access Code: [email or web address]  URL: [email or web address]  Date: 03/16/2023  Prepared by: Kylee Tello  • Supine Active Straight Leg Raise - 2 x daily - 7 x weekly - 3 sets - 10 reps  • Cat Cow - 2 x daily - 7 x weekly - 3 sets - 20 reps  • Child's Pose - 2 x daily - 7 x weekly - 3 sets - 10 reps (ignore blanket instructions)  • Supine Bridge - 2 x daily - 7 x weekly - 3 sets - 10 reps

## 2023-03-21 ENCOUNTER — OFFICE VISIT (OUTPATIENT)
Dept: PHYSICAL THERAPY | Facility: CLINIC | Age: 48
End: 2023-03-21

## 2023-03-21 DIAGNOSIS — M54.16 CHRONIC LEFT-SIDED LUMBAR RADICULOPATHY: Primary | ICD-10-CM

## 2023-03-21 NOTE — PROGRESS NOTES
PT Re-Evaluation     Today's date: 3/23/2023  Patient name: Justice Lynn  : 1975  MRN: [id number]  Referring provider: Mable Martinez*  Dx:   Encounter Diagnosis     ICD-10-CM    1  Chronic left-sided lumbar radiculopathy  M54 16                       Assessment  Assessment details: The patient has been seen in PT for a total of visits since [facility]  He has made progress since [facility] and is working towards his goals  He has complaints of pain  He demonstrates deficits with decreased strength, pain with WBing through LLE, decreased neuromuscular control  These deficits lead to abnormal gait pattern, difficulty with stair negotiation, performing ADLs  The patient would benefit from continued PT to address deficits and improve function  Tx to include ROM, stretching, strengthening, modalities, HEP, pt education, postural ed, lifting/body mechanics, neuro re-ed, balance/proprioception Te, MT and equipment  Pt is a 52 yr old male presenting to physical therapy with s/s consistent with lumbar radiculopathy down into his L LE  He is currently limited in gross hip strength, pain while weightbearing through his L LE, decreased neuromuscular control of his back muscles leading to abnormal gait pattern, and increased nerve sensitivity marked by the positive SLR  These impairments are preventing him from ascending stairs, performing ADL's I, and ambulating with a normal gait pattern  He would be a great candidate for skilled physical therapy to address these functional limitations and return him to his PLOF  Impairments: abnormal muscle firing and pain with function    Symptom irritability: moderateUnderstanding of Dx/Px/POC: good   Prognosis: good    Goals  STG: (4 weeks)  1  Pt will be able to perform ADL's with less than a 2/10 pain in order to regain function  2  Pt will demo understanding of his HEP  LTG (8 weeks)  1   Pt will be able to sit > 60 minutes w/o pain in order to sit for prolonged periods of time  2  Pt will demo an understanding of their pain and how to modify activity when symptoms arise due to LBP longevity/chronicity  Plan  Patient would benefit from: skilled physical therapy  Referral necessary: No  Planned therapy interventions: joint mobilization, activity modification, manual therapy, massage, ADL training, motor coordination training, neuromuscular re-education, patient education, strengthening, stretching, therapeutic activities, therapeutic exercise, therapeutic training, graded exercise and breathing training  Frequency: 2x week  Duration in weeks: 6  Plan of Care beginning date: 3/23/2023  Plan of Care expiration date: 2023  Treatment plan discussed with: patient        Subjective Evaluation    History of Present Illness  Date of onset: 2022  Mechanism of injury: Pt reported back injury in , and his L sided sciatica has been flared up down his leg  Last few months it has been getting worse from sleeping on the couch  Pt reported his home exercises he does at home haven't been helping to much  He is currently taking naproxen and is very mentally focused on his pain  Denies hx of previous surgery on back, and hasn't had imaging since that injury  Pt does report N/T in his toes and his L calf  Pt does deny saddle anesthesia and bowel/bladder changes related to his back  UPDATE 3/23/23:  Goals: no pain,sitting without pain, pain free ADL's          Recurrent probem    Quality of life: fair    Pain  Current pain ratin  At best pain ratin  At worst pain ratin  Quality: dull ache, radiating, discomfort and burning          Objective     Tests     Lumbar     Left   Positive passive SLR           Lumbar AROM Left Right   Flexion WNL    Extension WNL, * LBP with radicular sxs into L LE     Lateral Flexion  Mildly limited, * L LBP with radicular sxs into L LE Mildly limited   Rotation     * = LBP    Spine mobility: hypomobile lower t-spine, lumbar spine    Hip screen: Perform next session  Muscle Length: Tight b/l hamstring, tested w/ 90/90 test        Manual Muscle Testing - Hip Left Right   Flexion 5 5   Extension 4 4   Abduction 4 4   External Rotation       Manual Muscle Testing - Knee Left Right   Flexion 5 5   Extension 5, *L LBP and L foot n/t  5     Manual Muscle Testing - Ankle Left Right   Doriflexion     Plantarflexion     EHL                 Precautions: none  Re-eval Date: 3/17/23    Date 3/23 2/23 2/24 3/16 3/21   Visit Count 6 2 3 4 5   FOTO        Pain In   4     Pain Out   5                  Manuals        IASTM targetting b/l erectors, Grade 3-4 PA's lower lumbar spine, L sided UPA's, passive sciatic nerve glide Performed 15 minutes Performed (15 min)  Performed (15 min)  Perform 15 minutes  Neuro Re-Ed         STS  3 x 8 w/ 15lbs      Paloff Press Mariam 10#  2x10 Jonny 2 x 10 w/ 15lbs   Mariam 10lbs   2 x 10 b/l  Deadlift        Lateral Step ups        Monster walks        deadbugs   x6 b/l Supine SLR focusing on TVA 2x 10 b/l      birddogs 12x Jonny LE only  2 rounds    X 12 b/l w/ just legs, 2 rounds   Repeated motion    FLX x 12, increased s/s  Ther Ex        Cat/camel   x20 2 x 20     LTR        Bridges  GTB 3 x 10 GTB 3 x 10     SL clamshells  3 x 10 L sided 3 x 10 b/l     Gastroc  3 rounds x 30 sec 3 x 30' L LE     RCB L3 10 mins for CV endurance   L3, 10 min for CV endurance and back mobility  L3, 10 min for CV endurance      maynor pose 2x10   2 x 10 2 x 12           Ther Activity                        Gait Training                        Modalities

## 2023-03-21 NOTE — PROGRESS NOTES
Daily Note     Today's date: 3/21/2023  Patient name: Angie Posey  : 1975  MRN: [id number]  Referring provider: Ben Smoker*  Dx:   Encounter Diagnosis     ICD-10-CM    1  Chronic left-sided lumbar radiculopathy  M54 16           Start Time: 1300  Stop Time: 1345  Total time in clinic (min): 45 minutes    Subjective: He reported his L lower back has been sore the last few days  "It seems really stiff"      Objective: See treatment diary below      Assessment: Tolerated treatment well  He presented with hypomobility in his L low back which was treated w/ IASTM which helped alleviate s/s  He was able to tolerate back mobility and core exercises today w/o eliciting more pain, so continue to progress and challenge patient  Patient demonstrated fatigue post treatment, exhibited good technique with therapeutic exercises and would benefit from continued PT      Plan: Continue per plan of care  Progress note due NV  Precautions: none  Re-eval Date: 3/17/23    Date 2/17 2/23 2/24 3/16 3/21   Visit Count 1 2 3 4 5   FOTO     NV   Pain In   4     Pain Out   5                  Manuals        IASTM targetting b/l erectors, Grade 3-4 PA's lower lumbar spine, L sided UPA's, passive sciatic nerve glide performed Performed (15 min)  Performed (15 min)  Perform 15 minutes  Neuro Re-Ed         STS  3 x 8 w/ 15lbs      Paloff Press  2 x 10 w/ 15lbs   Mariam 10lbs   2 x 10 b/l  Deadlift        Lateral Step ups        Monster walks        deadbugs   x6 b/l Supine SLR focusing on TVA 2x 10 b/l      birddogs     X 12 b/l w/ just legs, 2 rounds   Repeated motion    FLX x 12, increased s/s  Ther Ex        Cat/camel   x20 2 x 20     LTR        Bridges  GTB 3 x 10 GTB 3 x 10     SL clamshells  3 x 10 L sided 3 x 10 b/l     Gastroc  3 rounds x 30 sec 3 x 30' L LE     RCB    L3, 10 min for CV endurance and back mobility  L3, 10 min for CV endurance  maynor pose    2 x 10 2 x 12           Ther Activity                        Gait Training                        Modalities                        Access Code: [email or web address]  URL: [email or web address]  Date: 03/16/2023  Prepared by: Claribel Tello  • Supine Active Straight Leg Raise - 2 x daily - 7 x weekly - 3 sets - 10 reps  • Cat Cow - 2 x daily - 7 x weekly - 3 sets - 20 reps  • Child's Pose - 2 x daily - 7 x weekly - 3 sets - 10 reps (ignore blanket instructions)  • Supine Bridge - 2 x daily - 7 x weekly - 3 sets - 10 reps

## 2023-03-23 ENCOUNTER — APPOINTMENT (OUTPATIENT)
Dept: PHYSICAL THERAPY | Facility: CLINIC | Age: 48
End: 2023-03-23
Payer: COMMERCIAL

## 2023-03-28 ENCOUNTER — APPOINTMENT (OUTPATIENT)
Dept: PHYSICAL THERAPY | Facility: CLINIC | Age: 48
End: 2023-03-28
Payer: COMMERCIAL

## 2023-03-30 ENCOUNTER — APPOINTMENT (OUTPATIENT)
Dept: PHYSICAL THERAPY | Facility: CLINIC | Age: 48
End: 2023-03-30
Payer: COMMERCIAL

## 2023-05-25 NOTE — TELEPHONE ENCOUNTER
Patient called stating he leaves for vacation later today and needs the refills to   Thank you  Well Child Visit at 18 Months   AMBULATORY CARE:   A well child visit  is when your child sees a healthcare provider to prevent health problems  Well child visits are used to track your child's growth and development  It is also a time for you to ask questions and to get information on how to keep your child safe  Write down your questions so you remember to ask them  Your child should have regular well child visits from birth to 16 years  Development milestones your child may reach at 18 months:  Each child develops at his or her own pace  Your child might have already reached the following milestones, or he or she may reach them later:  Say up to 20 words    Point to at least 1 body part, such as an ear or nose    Climb stairs if someone holds his or her hand    Run for short distances    Throw a ball or play with another person    Take off more clothes, such as his or her shirt    Feed himself or herself with a spoon, and use a cup    Pretend to feed a doll or help around the house    [place] 2 to 3 small blocks    Keep your child safe in the car: Always place your child in a rear-facing car seat  Choose a seat that meets the Federal Motor Vehicle Safety Standard 213  Make sure the child safety seat has a harness and clip  Also make sure that the harness and clips fit snugly against your child  There should be no more than a finger width of space between the strap and your child's chest  Ask your healthcare provider for more information on car safety seats  Always put your child's car seat in the back seat  Never put your child's car seat in the front  This will help prevent him or her from being injured in an accident  Keep your child safe at home:   Place haney at the top and bottom of stairs  Always make sure that the gate is closed and locked  [place] will help protect your child from injury  Go up and down stairs with your child to make sure he or she stays safe on the stairs      Place guards over windows on the second floor or higher  This will prevent your child from falling out of the window  Keep furniture away from windows  Use cordless window shades, or get cords that do not have loops  You can also cut the loops  A child's head can fall through a looped cord, and the cord can become wrapped around his or her neck  Secure heavy or large items  This includes bookshelves, TVs, dressers, cabinets, and lamps  Make sure these items are held in place or nailed into the wall  Keep all medicines, car supplies, lawn supplies, and cleaning supplies out of your child's reach  Keep these items in a locked cabinet or closet  Call Poison Help (9-441.507.4669) if your child eats anything that could be harmful  Keep hot items away from your child  Turn pot handles toward the back on the stove  Keep hot food and liquid out of your child's reach  Do not hold your child while you have a hot item in your hand or are near a lit stove  Do not leave curling irons or similar items on a counter  Your child may grab for the item and burn his or her hand  Store and lock all guns and weapons  Make sure all guns are unloaded before you store them  Make sure your child cannot reach or find where weapons are kept  Never  leave a loaded gun unattended  Keep your child safe in the sun and near water:   Always keep your child within reach near water  This includes any time you are near ponds, lakes, pools, the ocean, or the bathtub  Never  leave your child alone in the bathtub or sink  A child can drown in less than 1 inch of water  Put sunscreen on your child  Ask your healthcare provider which sunscreen is safe for your child  Do not apply sunscreen to your child's eyes, mouth, or hands  Other ways to keep your child safe: Follow directions on the medicine label when you give your child medicine  Ask your child's healthcare provider for directions if you do not know how to give the medicine   If your child misses a dose, do not double the next dose  Ask how to make up the missed dose  Do not give aspirin to children younger than 18 years  Your child could develop Reye syndrome if he or she has the flu or a fever and takes aspirin  Reye syndrome can cause life-threatening brain and liver damage  Check your child's medicine labels for aspirin or salicylates  Keep plastic bags, latex balloons, and small objects away from your child  This includes marbles and small toys  These items can cause choking or suffocation  Regularly check the floor for these objects  Do not let your child use a walker  Walkers are not safe for your child  Walkers do not help your child learn to walk  Your child can roll down the stairs  Walkers also allow your child to reach higher  Your child might reach for hot drinks, grab pot handles off the stove, or reach for medicines or other unsafe items  Never leave your child in a room alone  Make sure there is always a responsible adult with your child  What you need to know about nutrition for your child:   Give your child a variety of healthy foods  Healthy foods include fruits, vegetables, lean meats, and whole grains  Cut all foods into small pieces  Ask your healthcare provider how much of each type of food your child needs  The following are examples of healthy foods:    Whole grains such as bread, hot or cold cereal, and cooked pasta or rice    Protein from lean meats, chicken, fish, beans, or eggs    Dairy such as whole milk, cheese, or yogurt    Vegetables such as carrots, broccoli, or spinach    Fruits such as strawberries, oranges, apples, or tomatoes       Give your child whole milk until he or she is 3years old  Give your child no more than 2 to 3 cups of whole milk each day  His or her body needs the extra fat in whole milk to help him or her grow  After your child turns 2, he or she can drink skim or low-fat milk (such as 1% or 2% milk)   Your child's healthcare provider may recommend low-fat milk if your child is overweight  Limit foods high in fat and sugar  These foods do not have the nutrients your child needs to be healthy  Food high in fat and sugar include snack foods (potato chips, candy, and other sweets), juice, fruit drinks, and soda  If your child eats these foods often, he or she may eat fewer healthy foods during meals  Your child may gain too much weight  Do not give your child foods that could cause him or her to choke  Examples include nuts, popcorn, and hard, raw vegetables  Cut round or hard foods into thin slices  Grapes and hotdogs are examples of round foods  Carrots are an example of hard foods  Give your child 3 meals and 2 to 3 snacks per day  Cut all food into small pieces  Examples of healthy snacks include applesauce, bananas, crackers, and cheese  Encourage your child to feed himself or herself  Give your child a cup to drink from and spoon to eat with  Be patient with your child  Food may end up on the floor or on your child instead of in his or her mouth  It will take time for him or her to learn how to use a spoon to feed himself or herself  Have your child eat with other family members  This gives your child the opportunity to watch and learn how others eat  Let your child decide how much to eat  Give your child small portions  Let your child have another serving if he or she asks for one  Your child will be very hungry on some days and want to eat more  For example, your child may want to eat more on days when he or she is more active  Your child may also eat more if he or she is going through a growth spurt  There may be days when he or she eats less than usual          Know that picky eating is a normal behavior in children under 3years of age  Your child may like a certain food on one day and then decide he or she does not like it the next day   He or she may eat only 1 or 2 foods for a whole week or longer  Your child may not like mixed foods, or he or she may not want different foods on the plate to touch  These eating habits are all normal  Continue to offer 2 or 3 different foods at each meal, even if your child is going through this phase  Offer new foods several times  At 18 months, your child may mouth or touch foods to try them  Offer foods with different textures and flavors  You may need to offer a new food a few times before your child will like it  Keep your child's teeth healthy:   A child younger than 2 years needs to have his or her teeth brushed 2 times each day  Brush your child's teeth with a children's toothbrush and water  Your child's healthcare provider may recommend that you brush your child's teeth with a small smear of toothpaste with fluoride  Make sure your child spits all of the toothpaste out  Before your child's teeth come in, clean his or her gums and mouth with a soft cloth or infant toothbrush once a day  Thumb sucking or pacifier use can affect your child's tooth development  Talk to your child's healthcare provider if your child sucks his or her thumb or uses a pacifier regularly  Take your child to the dentist regularly  A dentist can make sure your child's teeth and gums are developing properly  Your child may be given a fluoride treatment to prevent cavities  Ask your child's dentist how often he or she needs to visit  Create routines for your child:   Have your child take at least 1 nap each day  Plan the nap early enough in the day so your child is still tired at bedtime  Your child needs 12 to 14 hours of sleep every night  Create a bedtime routine  This may include 1 hour of calm and quiet activities before bed  You can read to your child or listen to music  Brush your child's teeth during his or her bedtime routine  Plan for family time  Start family traditions such as going for a walk, listening to music, or playing games   Do not watch TV "during family time  Have your child play with other family members during family time  Limit time away from home to an hour or less  Your child may become tired if an activity is longer than an hour  Your child may act out or have a tantrum if he or she becomes too tired  What you need to know about toilet training: Toilet training can start between 25 and 25months of age  Your child will need to be able to stay dry for about 2 hours at a time before you can start toilet training  He or she will also need to know wet and dry  Your child also needs to know when he or she needs to have a bowel movement  You can help your child get ready for toilet training  Read books with your child about how to use the toilet  Take your child into the bathroom with a parent or older brother or sister  Let him or her practice sitting on the toilet with his or her clothes on  Other ways to support your child:   Do not punish your child with hitting, spanking, or yelling  Never  shake your child  Tell your child \"no  \" Give your child short and simple rules  Do not allow your child to hit, kick, or bite another person  Put your child in time-out for 1 to 2 minutes in his or her crib or playpen  You can distract your child with a new activity when he or she behaves badly  Make sure everyone who cares for your child disciplines him or her the same way  Be firm and consistent with tantrums  Temper tantrums are normal at 18 months  Your child may cry, yell, kick, or refuse to do what he or she is told  Stay calm and be firm  Reward your child for good behavior  This will encourage your child to behave well  Read to your child  This will comfort your child and help his or her brain develop  Point to pictures as you read  This will help your child make connections between pictures and words  Have other family members or caregivers read to your child  Your child may want to hear the same book over and over   This is normal at 18 " months  Play with your child  This will help your child develop social skills, motor skills, and speech  Take your child to play groups or activities  Let your child play with other children  This will help him or her grow and develop  Your child might not be willing to share his or her toys  Respect your child's fear of strangers  It is normal for your child to be afraid of strangers at this age  Do not force your child to talk or play with people he or she does not know  Your child will start to become more independent at 18 months, but he or she may also cling to you around strangers  Limit your child's TV time as directed  Your child's brain will develop best through interaction with other people  This includes video chatting through a computer or phone with family or friends  Talk to your child's healthcare provider if you want to let your child watch TV  He or she can help you set healthy limits  Experts usually recommend less than 1 hour of TV per day for children aged 18 months to 2 years  Your provider may also be able to recommend appropriate programs for your child  Engage with your child if he or she watches TV  Do not let your child watch TV alone, if possible  You or another adult should watch with your child  Talk with your child about what he or she is watching  When TV time is done, try to apply what you and your child saw  For example, if your child saw someone counting blocks, have your child count his or her blocks  TV time should never replace active playtime  Turn the TV off when your child plays  Do not let your child watch TV during meals or within 1 hour of bedtime  What you need to know about your child's next well child visit:  Your child's healthcare provider will tell you when to bring him or her in again  The next well child visit is usually at 2 years (24 months)   Contact your child's healthcare provider if you have questions or concerns about his or her health or care before the next visit  Your child may need vaccines at the next well child visit  Your provider will tell you which vaccines your child needs and when your child should get them  © Copyright [employer] 2022 Information is for End User's use only and may not be sold, redistributed or otherwise used for commercial purposes  The above information is an  only  It is not intended as medical advice for individual conditions or treatments  Talk to your doctor, nurse or pharmacist before following any medical regimen to see if it is safe and effective for you

## 2023-12-08 ENCOUNTER — TELEPHONE (OUTPATIENT)
Dept: FAMILY MEDICINE CLINIC | Facility: CLINIC | Age: 48
End: 2023-12-08

## 2023-12-08 NOTE — TELEPHONE ENCOUNTER
Unable to lmom for pt to confirm whether or not pt will continue care within our office. On 3/10/23 Pt reached out elsewhere to schedule a new PCP visit, but then never responded to calls from other office. Still unable to reach pt for scheduling.

## 2024-03-18 ENCOUNTER — TELEPHONE (OUTPATIENT)
Dept: INTERNAL MEDICINE CLINIC | Facility: CLINIC | Age: 49
End: 2024-03-18

## 2024-03-18 NOTE — TELEPHONE ENCOUNTER
Tried calling Bro to schedule him an appointment, phone did not ring and a message played that the person can not be reached to call back later .    Hi, my name is Bro Roldan. I am calling to make an appointment with Doctor Yehuda. If you could please give me a call back, that'd be great. My birthday is [birth year]. I don't know if that's going to do anything for you. I may be in your system for a very long time ago, but anyhow. Number [id number], name is Conrado, [phone] Train appointment with Doctor Fields and I did have a couple of questions as well with concerning insurance. Alright, thank you very much. Take care.

## 2024-04-01 ENCOUNTER — APPOINTMENT (OUTPATIENT)
Dept: RADIOLOGY | Facility: CLINIC | Age: 49
End: 2024-04-01
Payer: COMMERCIAL

## 2024-04-01 ENCOUNTER — OFFICE VISIT (OUTPATIENT)
Dept: INTERNAL MEDICINE CLINIC | Facility: CLINIC | Age: 49
End: 2024-04-01
Payer: COMMERCIAL

## 2024-04-01 VITALS
WEIGHT: 315 LBS | HEART RATE: 71 BPM | DIASTOLIC BLOOD PRESSURE: 100 MMHG | HEIGHT: 76 IN | TEMPERATURE: 98 F | BODY MASS INDEX: 38.36 KG/M2 | SYSTOLIC BLOOD PRESSURE: 140 MMHG

## 2024-04-01 DIAGNOSIS — I87.2 VENOUS INSUFFICIENCY OF BOTH LOWER EXTREMITIES: ICD-10-CM

## 2024-04-01 DIAGNOSIS — M25.562 ARTHRALGIA OF BOTH KNEES: ICD-10-CM

## 2024-04-01 DIAGNOSIS — Z23 ENCOUNTER FOR IMMUNIZATION: ICD-10-CM

## 2024-04-01 DIAGNOSIS — J45.21 MILD INTERMITTENT ASTHMATIC BRONCHITIS WITH ACUTE EXACERBATION: ICD-10-CM

## 2024-04-01 DIAGNOSIS — R60.0 PERIPHERAL EDEMA: ICD-10-CM

## 2024-04-01 DIAGNOSIS — M54.50 LUMBAR SPINE PAIN: ICD-10-CM

## 2024-04-01 DIAGNOSIS — M25.561 ARTHRALGIA OF BOTH KNEES: ICD-10-CM

## 2024-04-01 DIAGNOSIS — H40.1130 PRIMARY OPEN ANGLE GLAUCOMA OF BOTH EYES, UNSPECIFIED GLAUCOMA STAGE: ICD-10-CM

## 2024-04-01 DIAGNOSIS — J30.9 ALLERGIC RHINITIS, UNSPECIFIED SEASONALITY, UNSPECIFIED TRIGGER: ICD-10-CM

## 2024-04-01 DIAGNOSIS — E78.00 HYPERCHOLESTEROLEMIA: ICD-10-CM

## 2024-04-01 DIAGNOSIS — F90.0 ATTENTION DEFICIT HYPERACTIVITY DISORDER (ADHD), PREDOMINANTLY INATTENTIVE TYPE: ICD-10-CM

## 2024-04-01 DIAGNOSIS — K21.9 GASTROESOPHAGEAL REFLUX DISEASE WITHOUT ESOPHAGITIS: ICD-10-CM

## 2024-04-01 DIAGNOSIS — I10 ESSENTIAL HYPERTENSION: ICD-10-CM

## 2024-04-01 DIAGNOSIS — E66.01 MORBID OBESITY WITH BMI OF 40.0-44.9, ADULT (HCC): ICD-10-CM

## 2024-04-01 DIAGNOSIS — F41.9 ANXIETY: ICD-10-CM

## 2024-04-01 DIAGNOSIS — Z12.11 SCREENING FOR COLON CANCER: Primary | ICD-10-CM

## 2024-04-01 PROBLEM — E05.90 HYPERTHYROIDISM: Status: RESOLVED | Noted: 2018-03-15 | Resolved: 2024-04-01

## 2024-04-01 PROCEDURE — 73562 X-RAY EXAM OF KNEE 3: CPT

## 2024-04-01 PROCEDURE — 99205 OFFICE O/P NEW HI 60 MIN: CPT | Performed by: INTERNAL MEDICINE

## 2024-04-01 RX ORDER — HYDROXYZINE HYDROCHLORIDE 25 MG/1
25 TABLET, FILM COATED ORAL 3 TIMES DAILY PRN
COMMUNITY
End: 2024-04-01 | Stop reason: SDUPTHER

## 2024-04-01 RX ORDER — OMEPRAZOLE 40 MG/1
40 CAPSULE, DELAYED RELEASE ORAL DAILY
Qty: 90 CAPSULE | Refills: 3 | Status: SHIPPED | OUTPATIENT
Start: 2024-04-01 | End: 2025-03-27

## 2024-04-01 RX ORDER — METHYLPREDNISOLONE 4 MG/1
TABLET ORAL
Qty: 21 EACH | Refills: 0 | Status: SHIPPED | OUTPATIENT
Start: 2024-04-01

## 2024-04-01 RX ORDER — FLUTICASONE PROPIONATE 50 MCG
2 SPRAY, SUSPENSION (ML) NASAL DAILY
Qty: 16 G | Refills: 0 | Status: SHIPPED | OUTPATIENT
Start: 2024-04-01

## 2024-04-01 RX ORDER — CHLORAL HYDRATE 500 MG
2000 CAPSULE ORAL DAILY
Qty: 60 CAPSULE | Refills: 11 | Status: SHIPPED | COMMUNITY
Start: 2024-04-01 | End: 2025-03-27

## 2024-04-01 RX ORDER — HYDROXYZINE HYDROCHLORIDE 25 MG/1
25 TABLET, FILM COATED ORAL 3 TIMES DAILY PRN
Qty: 90 TABLET | Refills: 3 | Status: SHIPPED | OUTPATIENT
Start: 2024-04-01 | End: 2025-03-27

## 2024-04-01 RX ORDER — CETIRIZINE HYDROCHLORIDE 10 MG/1
10 TABLET ORAL EVERY MORNING
COMMUNITY
Start: 2024-03-21

## 2024-04-01 RX ORDER — NAPROXEN 500 MG/1
500 TABLET ORAL 2 TIMES DAILY PRN
Qty: 180 TABLET | Refills: 0 | Status: SHIPPED | OUTPATIENT
Start: 2024-04-01 | End: 2024-04-01

## 2024-04-01 RX ORDER — ALBUTEROL SULFATE 90 UG/1
1 AEROSOL, METERED RESPIRATORY (INHALATION) EVERY 4 HOURS PRN
Qty: 20.1 G | Refills: 3 | Status: SHIPPED | OUTPATIENT
Start: 2024-04-01 | End: 2024-04-05 | Stop reason: SDUPTHER

## 2024-04-01 RX ORDER — OMEPRAZOLE 40 MG/1
40 CAPSULE, DELAYED RELEASE ORAL DAILY
COMMUNITY
Start: 2024-02-17 | End: 2024-04-01

## 2024-04-01 RX ORDER — DEXTROAMPHETAMINE SULFATE 20 MG/1
20 TABLET ORAL DAILY
Qty: 30 TABLET | Refills: 0 | Status: SHIPPED | OUTPATIENT
Start: 2024-04-01 | End: 2024-05-01

## 2024-04-01 RX ORDER — VALSARTAN 160 MG/1
160 TABLET ORAL DAILY
Qty: 90 TABLET | Refills: 3 | Status: SHIPPED | OUTPATIENT
Start: 2024-04-01

## 2024-04-01 RX ORDER — DEXTROAMPHETAMINE SULFATE 20 MG/1
20 TABLET ORAL DAILY
COMMUNITY
End: 2024-04-01 | Stop reason: SDUPTHER

## 2024-04-01 RX ORDER — FAMOTIDINE 40 MG/1
40 TABLET, FILM COATED ORAL EVERY MORNING
Qty: 90 TABLET | Refills: 3 | Status: SHIPPED | OUTPATIENT
Start: 2024-04-01 | End: 2025-03-27

## 2024-04-01 RX ORDER — OXAZEPAM 15 MG/1
15 CAPSULE ORAL DAILY PRN
COMMUNITY

## 2024-04-01 RX ORDER — NAPROXEN 500 MG/1
500 TABLET ORAL 2 TIMES DAILY PRN
Qty: 180 TABLET | Refills: 0 | Status: SHIPPED | COMMUNITY
Start: 2024-04-01 | End: 2024-06-30

## 2024-04-01 RX ORDER — FAMOTIDINE 40 MG/1
40 TABLET, FILM COATED ORAL EVERY MORNING
COMMUNITY
Start: 2024-01-12 | End: 2024-04-01 | Stop reason: SDUPTHER

## 2024-04-01 NOTE — PROGRESS NOTES
Assessment/Plan:    Problem List Items Addressed This Visit     Essential hypertension    Relevant Medications    valsartan (DIOVAN) 160 mg tablet    Gastroesophageal reflux disease    Relevant Medications    famotidine (PEPCID) 40 MG tablet    omeprazole (PriLOSEC) 40 MG capsule    Hypercholesterolemia    Relevant Medications    Omega-3 Fatty Acids (fish oil) 1,000 mg    Rosuvastatin Calcium 20 MG CPSP    Lumbar spine pain    Relevant Medications    naproxen (NAPROSYN) 500 mg tablet    Morbid obesity with BMI of 40.0-44.9, adult (HCC)    Relevant Medications    Semaglutide-Weight Management (WEGOVY) 0.25 MG/0.5ML    Semaglutide-Weight Management (WEGOVY) 0.5 MG/0.5ML (Start on 4/27/2024)    Semaglutide-Weight Management (WEGOVY) 1 MG/0.5ML (Start on 5/25/2024)    Semaglutide-Weight Management (WEGOVY) 1.7 MG/0.75ML (Start on 6/22/2024)    Semaglutide-Weight Management (WEGOVY) 2.4 MG/0.75ML (Start on 7/20/2024)    Arthralgia of both knees    Relevant Medications    methylPREDNISolone 4 MG tablet therapy pack    Other Relevant Orders    XR knee 3 vw left non injury    XR knee 3 vw right non injury    Ambulatory Referral to Orthopedic Surgery    Attention deficit hyperactivity disorder (ADHD), predominantly inattentive type    Relevant Medications    oxazepam (SERAX) 15 mg capsule    Semaglutide-Weight Management (WEGOVY) 0.25 MG/0.5ML    Semaglutide-Weight Management (WEGOVY) 0.5 MG/0.5ML (Start on 4/27/2024)    Semaglutide-Weight Management (WEGOVY) 1 MG/0.5ML (Start on 5/25/2024)    Semaglutide-Weight Management (WEGOVY) 1.7 MG/0.75ML (Start on 6/22/2024)    Semaglutide-Weight Management (WEGOVY) 2.4 MG/0.75ML (Start on 7/20/2024)    Dextroamphetamine Sulfate 20 MG TABS    hydrOXYzine HCL (ATARAX) 25 mg tablet    Peripheral edema    Relevant Orders    Echo complete w/ contrast if indicated     VAS VENOUS DUPLEX - LOWER LIMB BILATERAL   Other Visit Diagnoses     Screening for colon cancer    -  Primary    Relevant  Orders    Cologuard    Encounter for immunization        Anxiety        Relevant Medications    hydrOXYzine HCL (ATARAX) 25 mg tablet    Other Relevant Orders    Ambulatory Referral to Ophthalmology    Ambulatory referral to Psych Services    Primary open angle glaucoma of both eyes, unspecified glaucoma stage        Mild intermittent asthmatic bronchitis with acute exacerbation        Relevant Medications    cetirizine (ZyrTEC) 10 mg tablet    albuterol (ProAir HFA) 90 mcg/act inhaler    fluticasone (FLONASE) 50 mcg/act nasal spray    Allergic rhinitis, unspecified seasonality, unspecified trigger        Relevant Medications    naproxen (NAPROSYN) 500 mg tablet    methylPREDNISolone 4 MG tablet therapy pack    fluticasone (FLONASE) 50 mcg/act nasal spray    Venous insufficiency of both lower extremities        Relevant Orders     VAS VENOUS DUPLEX - LOWER LIMB BILATERAL           Diagnoses and all orders for this visit:    Screening for colon cancer  -     Cologuard    Encounter for immunization  -     Cancel: Pneumococcal Conjugate Vaccine 20-valent (Pcv20)    Hypercholesterolemia  -     Omega-3 Fatty Acids (fish oil) 1,000 mg; Take 2 capsules (2,000 mg total) by mouth daily  -     Rosuvastatin Calcium 20 MG CPSP; Take 20 mg by mouth in the morning    Lumbar spine pain  -     naproxen (NAPROSYN) 500 mg tablet; Take 1 tablet (500 mg total) by mouth 2 (two) times a day as needed for moderate pain    Morbid obesity with BMI of 40.0-44.9, adult (HCC)  -     Semaglutide-Weight Management (WEGOVY) 0.25 MG/0.5ML; Inject 0.5 mL (0.25 mg total) under the skin once a week for 28 days  -     Semaglutide-Weight Management (WEGOVY) 0.5 MG/0.5ML; Inject 0.5 mL (0.5 mg total) under the skin once a week for 28 days Do not start before April 27, 2024.  -     Semaglutide-Weight Management (WEGOVY) 1 MG/0.5ML; Inject 0.5 mL (1 mg total) under the skin once a week for 28 days Do not start before May 25, 2024.  -      Semaglutide-Weight Management (WEGOVY) 1.7 MG/0.75ML; Inject 0.75 mL (1.7 mg total) under the skin once a week for 28 days Do not start before June 22, 2024.  -     Semaglutide-Weight Management (WEGOVY) 2.4 MG/0.75ML; Inject 0.75 mL (2.4 mg total) under the skin once a week for 28 days Do not start before July 20, 2024.    Arthralgia of both knees  -     methylPREDNISolone 4 MG tablet therapy pack; Use as directed on package  -     XR knee 3 vw left non injury; Future  -     XR knee 3 vw right non injury; Future  -     Ambulatory Referral to Orthopedic Surgery; Future    Anxiety  -     Ambulatory Referral to Ophthalmology; Future  -     Ambulatory referral to Psych Services; Future  -     hydrOXYzine HCL (ATARAX) 25 mg tablet; Take 1 tablet (25 mg total) by mouth 3 (three) times a day as needed for anxiety    Primary open angle glaucoma of both eyes, unspecified glaucoma stage    Mild intermittent asthmatic bronchitis with acute exacerbation  -     albuterol (ProAir HFA) 90 mcg/act inhaler; Inhale 1 puff every 4 (four) hours as needed for wheezing or shortness of breath    Attention deficit hyperactivity disorder (ADHD), predominantly inattentive type  -     Dextroamphetamine Sulfate 20 MG TABS; Take 1 tablet (20 mg total) by mouth in the morning Max Daily Amount: 20 mg    Gastroesophageal reflux disease without esophagitis  -     famotidine (PEPCID) 40 MG tablet; Take 1 tablet (40 mg total) by mouth every morning  -     omeprazole (PriLOSEC) 40 MG capsule; Take 1 capsule (40 mg total) by mouth daily    Allergic rhinitis, unspecified seasonality, unspecified trigger  -     fluticasone (FLONASE) 50 mcg/act nasal spray; 2 sprays into each nostril daily    Peripheral edema  -     Echo complete w/ contrast if indicated; Future  -      VAS VENOUS DUPLEX - LOWER LIMB BILATERAL; Future    Venous insufficiency of both lower extremities  -      VAS VENOUS DUPLEX - LOWER LIMB BILATERAL; Future    Essential hypertension  -      valsartan (DIOVAN) 160 mg tablet; Take 1 tablet (160 mg total) by mouth daily    Other orders  -     Discontinue: Rosuvastatin Calcium 20 MG CPSP; Take 20 mg by mouth in the morning  -     cetirizine (ZyrTEC) 10 mg tablet; Take 10 mg by mouth every morning  -     Discontinue: naproxen (NAPROSYN) 500 mg tablet; Take 1 tablet (500 mg total) by mouth 2 (two) times a day as needed for moderate pain  -     Discontinue: famotidine (PEPCID) 40 MG tablet; Take 40 mg by mouth every morning  -     Discontinue: hydrOXYzine HCL (ATARAX) 25 mg tablet; Take 25 mg by mouth 3 (three) times a day as needed for anxiety  -     Discontinue: Dextroamphetamine Sulfate 20 MG TABS; Take 20 mg by mouth in the morning  -     oxazepam (SERAX) 15 mg capsule; Take 15 mg by mouth daily as needed for anxiety  -     Discontinue: omeprazole (PriLOSEC) 40 MG capsule; Take 40 mg by mouth daily        No problem-specific Assessment & Plan notes found for this encounter.        Subjective:      Patient ID: Bro Roldan is a 48 y.o. male.    Multiple issues         The following portions of the patient's history were reviewed and updated as appropriate:   He has a past medical history of Allergic, Anxiety, Arthritis, Asthma, Depression, GERD (gastroesophageal reflux disease), Hypertension, Inflammatory bowel disease, Obesity, and Visual impairment.,  does not have any pertinent problems on file.,   has a past surgical history that includes Eye surgery and Knee surgery (2006).,  family history is not on file.,   reports that he has quit smoking. He has never been exposed to tobacco smoke. He has never used smokeless tobacco. He reports that he does not currently use alcohol. He reports current drug use. Drug: Marijuana.,  is allergic to cat hair extract and pollen extract..  Current Outpatient Medications   Medication Sig Dispense Refill   • albuterol (ProAir HFA) 90 mcg/act inhaler Inhale 1 puff every 4 (four) hours as needed for wheezing or shortness  of breath 20.1 g 3   • cetirizine (ZyrTEC) 10 mg tablet Take 10 mg by mouth every morning     • Dextroamphetamine Sulfate 20 MG TABS Take 1 tablet (20 mg total) by mouth in the morning Max Daily Amount: 20 mg 30 tablet 0   • famotidine (PEPCID) 40 MG tablet Take 1 tablet (40 mg total) by mouth every morning 90 tablet 3   • fluticasone (FLONASE) 50 mcg/act nasal spray 2 sprays into each nostril daily 16 g 0   • hydrOXYzine HCL (ATARAX) 25 mg tablet Take 1 tablet (25 mg total) by mouth 3 (three) times a day as needed for anxiety 90 tablet 3   • methylPREDNISolone 4 MG tablet therapy pack Use as directed on package 21 each 0   • naproxen (NAPROSYN) 500 mg tablet Take 1 tablet (500 mg total) by mouth 2 (two) times a day as needed for moderate pain 180 tablet 0   • Omega-3 Fatty Acids (fish oil) 1,000 mg Take 2 capsules (2,000 mg total) by mouth daily 60 capsule 11   • omeprazole (PriLOSEC) 40 MG capsule Take 1 capsule (40 mg total) by mouth daily 90 capsule 3   • Rosuvastatin Calcium 20 MG CPSP Take 20 mg by mouth in the morning 90 capsule 30   • Semaglutide-Weight Management (WEGOVY) 0.25 MG/0.5ML Inject 0.5 mL (0.25 mg total) under the skin once a week for 28 days 2 mL 0   • [START ON 4/27/2024] Semaglutide-Weight Management (WEGOVY) 0.5 MG/0.5ML Inject 0.5 mL (0.5 mg total) under the skin once a week for 28 days Do not start before April 27, 2024. 2 mL 0   • [START ON 5/25/2024] Semaglutide-Weight Management (WEGOVY) 1 MG/0.5ML Inject 0.5 mL (1 mg total) under the skin once a week for 28 days Do not start before May 25, 2024. 2 mL 0   • [START ON 6/22/2024] Semaglutide-Weight Management (WEGOVY) 1.7 MG/0.75ML Inject 0.75 mL (1.7 mg total) under the skin once a week for 28 days Do not start before June 22, 2024. 3 mL 0   • [START ON 7/20/2024] Semaglutide-Weight Management (WEGOVY) 2.4 MG/0.75ML Inject 0.75 mL (2.4 mg total) under the skin once a week for 28 days Do not start before July 20, 2024. 3 mL 0   • valsartan  "(DIOVAN) 160 mg tablet Take 1 tablet (160 mg total) by mouth daily 90 tablet 3   • zolpidem (AMBIEN) 5 mg tablet Take 1 tablet (5 mg total) by mouth daily at bedtime as needed for sleep 30 tablet 0   • oxazepam (SERAX) 15 mg capsule Take 15 mg by mouth daily as needed for anxiety       No current facility-administered medications for this visit.       Review of Systems   Constitutional:  Negative for chills, fatigue and fever.   HENT: Negative.     Respiratory:  Negative for cough, chest tightness and shortness of breath.    Cardiovascular:  Negative for chest pain and palpitations.   Gastrointestinal:  Negative for abdominal pain, constipation, diarrhea, nausea and vomiting.   Genitourinary: Negative.    Musculoskeletal:  Positive for arthralgias and myalgias. Negative for back pain.   Skin: Negative.    Neurological: Negative.    Psychiatric/Behavioral:  Negative for dysphoric mood. The patient is not nervous/anxious.          Objective:  Vitals:    04/01/24 1011   BP: 140/100   BP Location: Left arm   Patient Position: Sitting   Pulse: 71   Temp: 98 °F (36.7 °C)   Weight: (!) 156 kg (344 lb 2 oz)   Height: 6' 4\" (1.93 m)     Body mass index is 41.89 kg/m².     Physical Exam  Vitals and nursing note reviewed.   Constitutional:       Appearance: He is well-developed.   HENT:      Head: Normocephalic and atraumatic.   Eyes:      Pupils: Pupils are equal, round, and reactive to light.   Cardiovascular:      Rate and Rhythm: Normal rate and regular rhythm.      Heart sounds: Normal heart sounds. No murmur heard.  Pulmonary:      Effort: Pulmonary effort is normal.      Breath sounds: Normal breath sounds. No stridor. No rales.   Abdominal:      General: Bowel sounds are normal. There is no distension.      Palpations: Abdomen is soft.      Tenderness: There is no abdominal tenderness.   Musculoskeletal:         General: No deformity. Normal range of motion.      Cervical back: Normal range of motion and neck supple. "   Skin:     General: Skin is warm and dry.   Neurological:      Mental Status: He is alert and oriented to person, place, and time.          PHQ-2/9 Depression Screening    Little interest or pleasure in doing things: 1 - several days  Feeling down, depressed, or hopeless: 1 - several days  PHQ-2 Score: 2  PHQ-2 Interpretation: Negative depression screen

## 2024-04-02 ENCOUNTER — OFFICE VISIT (OUTPATIENT)
Dept: OBGYN CLINIC | Facility: CLINIC | Age: 49
End: 2024-04-02
Payer: COMMERCIAL

## 2024-04-02 VITALS
DIASTOLIC BLOOD PRESSURE: 89 MMHG | HEIGHT: 76 IN | BODY MASS INDEX: 38.36 KG/M2 | WEIGHT: 315 LBS | SYSTOLIC BLOOD PRESSURE: 133 MMHG | HEART RATE: 67 BPM

## 2024-04-02 DIAGNOSIS — M25.561 ARTHRALGIA OF BOTH KNEES: ICD-10-CM

## 2024-04-02 DIAGNOSIS — M17.0 PRIMARY OSTEOARTHRITIS OF BOTH KNEES: Primary | ICD-10-CM

## 2024-04-02 DIAGNOSIS — M25.562 ARTHRALGIA OF BOTH KNEES: ICD-10-CM

## 2024-04-02 PROCEDURE — 20610 DRAIN/INJ JOINT/BURSA W/O US: CPT | Performed by: ORTHOPAEDIC SURGERY

## 2024-04-02 PROCEDURE — 99203 OFFICE O/P NEW LOW 30 MIN: CPT | Performed by: ORTHOPAEDIC SURGERY

## 2024-04-02 RX ORDER — BUPIVACAINE HYDROCHLORIDE 2.5 MG/ML
4 INJECTION, SOLUTION INFILTRATION; PERINEURAL
Status: COMPLETED | OUTPATIENT
Start: 2024-04-02 | End: 2024-04-02

## 2024-04-02 RX ORDER — TRIAMCINOLONE ACETONIDE 40 MG/ML
80 INJECTION, SUSPENSION INTRA-ARTICULAR; INTRAMUSCULAR
Status: COMPLETED | OUTPATIENT
Start: 2024-04-02 | End: 2024-04-02

## 2024-04-02 RX ADMIN — TRIAMCINOLONE ACETONIDE 80 MG: 40 INJECTION, SUSPENSION INTRA-ARTICULAR; INTRAMUSCULAR at 11:00

## 2024-04-02 RX ADMIN — BUPIVACAINE HYDROCHLORIDE 4 ML: 2.5 INJECTION, SOLUTION INFILTRATION; PERINEURAL at 11:00

## 2024-04-02 NOTE — PROGRESS NOTES
ASSESSMENT/PLAN:    Diagnoses and all orders for this visit:    Arthralgia of both knees  -     Ambulatory Referral to Orthopedic Surgery    Other orders  -     Large joint arthrocentesis        X-rays of both of the patient's knees are consistent with moderate arthritic changes, especially along the medial joint lines.  There are no fractures or dislocations.  Possible treatment options were discussed with the patient.  He elected for corticosteroid injections.  Both of the patient's knees were injected with Kenalog and Marcaine.  He tolerated the injections quite well.  He will follow-up with our office in 2 months.  The patient is acceptable to this plan.    Return in about 1 month (around 5/2/2024).    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  Both knees were injected with Kenalog and Marcaine.  He tolerated procedures quite well.  Return back 3 months evaluation.  Weight loss to be beneficial      _____________________________________________________  CHIEF COMPLAINT:  Chief Complaint   Patient presents with    Left Knee - Pain    Right Knee - Pain         SUBJECTIVE:  Bro Roldan is a 48 y.o. male who presents to our office complaining of bilateral knee pain.  The patient states he has had this pain for over 10 months.  He notes weight gain of approximately 100 pounds over the last year.  He states he has been performing a home exercise program.  His pain is worsened when standing from a seated position and prolonged activity.  He denies any numbness or tingling.  He denies any fever or chills.    The following portions of the patient's history were reviewed and updated as appropriate: allergies, current medications, past family history, past medical history, past social history, past surgical history and problem list.    PAST MEDICAL HISTORY:  Past Medical History:   Diagnosis Date    Allergic     Anxiety     Arthritis     Asthma     Depression     GERD (gastroesophageal  reflux disease)     Hypertension     Inflammatory bowel disease     Obesity     Visual impairment        PAST SURGICAL HISTORY:  Past Surgical History:   Procedure Laterality Date    EYE SURGERY      2021/2022    KNEE SURGERY  2006       FAMILY HISTORY:  History reviewed. No pertinent family history.    SOCIAL HISTORY:  Social History     Tobacco Use    Smoking status: Former     Passive exposure: Never    Smokeless tobacco: Never   Vaping Use    Vaping status: Never Used   Substance Use Topics    Alcohol use: Not Currently     Comment: occasional    Drug use: Yes     Types: Marijuana     Comment: Medical Marijuana       MEDICATIONS:    Current Outpatient Medications:     albuterol (ProAir HFA) 90 mcg/act inhaler, Inhale 1 puff every 4 (four) hours as needed for wheezing or shortness of breath, Disp: 20.1 g, Rfl: 3    cetirizine (ZyrTEC) 10 mg tablet, Take 10 mg by mouth every morning, Disp: , Rfl:     Dextroamphetamine Sulfate 20 MG TABS, Take 1 tablet (20 mg total) by mouth in the morning Max Daily Amount: 20 mg, Disp: 30 tablet, Rfl: 0    famotidine (PEPCID) 40 MG tablet, Take 1 tablet (40 mg total) by mouth every morning, Disp: 90 tablet, Rfl: 3    fluticasone (FLONASE) 50 mcg/act nasal spray, 2 sprays into each nostril daily, Disp: 16 g, Rfl: 0    hydrOXYzine HCL (ATARAX) 25 mg tablet, Take 1 tablet (25 mg total) by mouth 3 (three) times a day as needed for anxiety, Disp: 90 tablet, Rfl: 3    methylPREDNISolone 4 MG tablet therapy pack, Use as directed on package, Disp: 21 each, Rfl: 0    naproxen (NAPROSYN) 500 mg tablet, Take 1 tablet (500 mg total) by mouth 2 (two) times a day as needed for moderate pain, Disp: 180 tablet, Rfl: 0    Omega-3 Fatty Acids (fish oil) 1,000 mg, Take 2 capsules (2,000 mg total) by mouth daily, Disp: 60 capsule, Rfl: 11    omeprazole (PriLOSEC) 40 MG capsule, Take 1 capsule (40 mg total) by mouth daily, Disp: 90 capsule, Rfl: 3    oxazepam (SERAX) 15 mg capsule, Take 15 mg by mouth  daily as needed for anxiety, Disp: , Rfl:     Rosuvastatin Calcium 20 MG CPSP, Take 20 mg by mouth in the morning, Disp: 90 capsule, Rfl: 30    Semaglutide-Weight Management (WEGOVY) 0.25 MG/0.5ML, Inject 0.5 mL (0.25 mg total) under the skin once a week for 28 days, Disp: 2 mL, Rfl: 0    [START ON 4/27/2024] Semaglutide-Weight Management (WEGOVY) 0.5 MG/0.5ML, Inject 0.5 mL (0.5 mg total) under the skin once a week for 28 days Do not start before April 27, 2024., Disp: 2 mL, Rfl: 0    [START ON 5/25/2024] Semaglutide-Weight Management (WEGOVY) 1 MG/0.5ML, Inject 0.5 mL (1 mg total) under the skin once a week for 28 days Do not start before May 25, 2024., Disp: 2 mL, Rfl: 0    [START ON 6/22/2024] Semaglutide-Weight Management (WEGOVY) 1.7 MG/0.75ML, Inject 0.75 mL (1.7 mg total) under the skin once a week for 28 days Do not start before June 22, 2024., Disp: 3 mL, Rfl: 0    [START ON 7/20/2024] Semaglutide-Weight Management (WEGOVY) 2.4 MG/0.75ML, Inject 0.75 mL (2.4 mg total) under the skin once a week for 28 days Do not start before July 20, 2024., Disp: 3 mL, Rfl: 0    valsartan (DIOVAN) 160 mg tablet, Take 1 tablet (160 mg total) by mouth daily, Disp: 90 tablet, Rfl: 3    zolpidem (AMBIEN) 5 mg tablet, Take 1 tablet (5 mg total) by mouth daily at bedtime as needed for sleep, Disp: 30 tablet, Rfl: 0    ALLERGIES:  Allergies   Allergen Reactions    Cat Hair Extract     Pollen Extract        ROS:  Review of Systems     Constitutional: Negative for fatigue, fever or loss of appetite.   HENT: Negative.    Respiratory: Negative for shortness of breath, dyspnea.    Cardiovascular: Negative for chest pain/tightness.   Gastrointestinal: Negative for abdominal pain, N/V.   Endocrine: Negative for cold/heat intolerance, unexplained weight loss/gain.   Genitourinary: Negative for flank pain, dysuria, hematuria.   Musculoskeletal: Positive for arthralgia   Skin: Negative for rash.    Neurological: Negative for numbness or  "tingling  Psychiatric/Behavioral: Negative for agitation.  _____________________________________________________  PHYSICAL EXAMINATION:    Blood pressure 133/89, pulse 67, height 6' 4\" (1.93 m), weight (!) 156 kg (344 lb).    Constitutional: Oriented to person, place, and time. Appears well-developed and well-nourished. No distress.   HENT:   Head: Normocephalic.   Eyes: Conjunctivae are normal. Right eye exhibits no discharge. Left eye exhibits no discharge. No scleral icterus.   Cardiovascular: Normal rate.    Pulmonary/Chest: Effort normal.   Neurological: Alert and oriented to person, place, and time.   Skin: Skin is warm and dry. No rash noted. Not diaphoretic. No erythema. No pallor.   Psychiatric: Normal mood and affect. Behavior is normal. Judgment and thought content normal.      MUSCULOSKELETAL EXAMINATION:   Physical Exam  Ortho Exam    Bilateral lower extremities are neurovascularly intact  Toes are pink and mobile   Compartments are soft  No warmth, erythema or ecchymosis  ROM of knees are from 5-115 degrees  Negative Lachman, drawer or pivot shift  No medial instability  Medial joint line tenderness, slight lateral joint line tenderness  Patellofemoral crepitation   Objective:  BP Readings from Last 1 Encounters:   04/02/24 133/89      Wt Readings from Last 1 Encounters:   04/02/24 (!) 156 kg (344 lb)        BMI:   Estimated body mass index is 41.87 kg/m² as calculated from the following:    Height as of this encounter: 6' 4\" (1.93 m).    Weight as of this encounter: 156 kg (344 lb).      PROCEDURES PERFORMED:  Large joint arthrocentesis: bilateral knee  Universal Protocol:  Consent: Verbal consent obtained.  Risks and benefits: risks, benefits and alternatives were discussed  Consent given by: patient  Patient understanding: patient states understanding of the procedure being performed  Site marked: the operative site was marked  Supporting Documentation  Indications: pain   Procedure " Details  Location: knee - bilateral knee  Preparation: Patient was prepped and draped in the usual sterile fashion  Needle size: 22 G  Ultrasound guidance: no  Approach: lateral    Medications (Right): 4 mL bupivacaine 0.25 %; 80 mg triamcinolone acetonide 40 mg/mLMedications (Left): 4 mL bupivacaine 0.25 %; 80 mg triamcinolone acetonide 40 mg/mL   Patient tolerance: patient tolerated the procedure well with no immediate complications  Dressing:  Sterile dressing applied            Scribe Attestation      I,:  Neal Hedrick PA-C am acting as a scribe while in the presence of the attending physician.:       I,:  Abdiel Herbert DO personally performed the services described in this documentation    as scribed in my presence.:

## 2024-04-04 ENCOUNTER — TELEPHONE (OUTPATIENT)
Dept: OTHER | Facility: OTHER | Age: 49
End: 2024-04-04

## 2024-04-04 DIAGNOSIS — J45.21 MILD INTERMITTENT ASTHMATIC BRONCHITIS WITH ACUTE EXACERBATION: ICD-10-CM

## 2024-04-04 DIAGNOSIS — I10 ESSENTIAL HYPERTENSION: ICD-10-CM

## 2024-04-04 NOTE — TELEPHONE ENCOUNTER
Pt called in to request his prescription for albuterol (ProAir HFA) 90 mcg/act inhaler and valsartan (DIOVAN) 160 mg tablet to resend to the pharmacy as the pharmacy states they never received.     [id number] - [place] [facility] - [address] [place] [id number]  Phone: [phone]  Fax: [phone]  COREY #: --

## 2024-04-05 DIAGNOSIS — J45.21 MILD INTERMITTENT ASTHMATIC BRONCHITIS WITH ACUTE EXACERBATION: ICD-10-CM

## 2024-04-05 RX ORDER — ALBUTEROL SULFATE 90 UG/1
1 AEROSOL, METERED RESPIRATORY (INHALATION) EVERY 4 HOURS PRN
Qty: 20.1 G | Refills: 3 | Status: SHIPPED | OUTPATIENT
Start: 2024-04-05 | End: 2025-03-31

## 2024-04-12 ENCOUNTER — TELEPHONE (OUTPATIENT)
Dept: INTERNAL MEDICINE CLINIC | Facility: CLINIC | Age: 49
End: 2024-04-12

## 2024-04-12 DIAGNOSIS — Z00.00 ROUTINE CHECK-UP: Primary | ICD-10-CM

## 2024-04-15 ENCOUNTER — HOSPITAL ENCOUNTER (OUTPATIENT)
Dept: NON INVASIVE DIAGNOSTICS | Facility: HOSPITAL | Age: 49
Discharge: HOME | End: 2024-04-15
Payer: COMMERCIAL

## 2024-04-15 DIAGNOSIS — R60.0 PERIPHERAL EDEMA: ICD-10-CM

## 2024-04-15 DIAGNOSIS — I87.2 VENOUS INSUFFICIENCY OF BOTH LOWER EXTREMITIES: ICD-10-CM

## 2024-04-15 PROCEDURE — 93970 EXTREMITY STUDY: CPT | Performed by: SURGERY

## 2024-04-15 PROCEDURE — 93970 EXTREMITY STUDY: CPT

## 2024-04-16 ENCOUNTER — TELEPHONE (OUTPATIENT)
Dept: PSYCHIATRY | Facility: CLINIC | Age: 49
End: 2024-04-16

## 2024-04-17 ENCOUNTER — HOSPITAL ENCOUNTER (OUTPATIENT)
Dept: NON INVASIVE DIAGNOSTICS | Facility: CLINIC | Age: 49
Discharge: HOME | End: 2024-04-17
Payer: COMMERCIAL

## 2024-04-17 VITALS
HEART RATE: 80 BPM | DIASTOLIC BLOOD PRESSURE: 80 MMHG | SYSTOLIC BLOOD PRESSURE: 128 MMHG | WEIGHT: 315 LBS | HEIGHT: 76 IN | BODY MASS INDEX: 38.36 KG/M2

## 2024-04-17 DIAGNOSIS — R60.0 PERIPHERAL EDEMA: ICD-10-CM

## 2024-04-17 LAB
AORTIC ROOT: 4.6 CM
APICAL FOUR CHAMBER EJECTION FRACTION: 59 %
ASCENDING AORTA: 3.8 CM
AV LVOT MEAN GRADIENT: 1 MMHG
AV LVOT PEAK GRADIENT: 3 MMHG
BSA FOR ECHO PROCEDURE: 2.79 M2
DOP CALC LVOT PEAK VEL VTI: 16.32 CM
DOP CALC LVOT PEAK VEL: 0.81 M/S
E WAVE DECELERATION TIME: 186 MS
E/A RATIO: 0.97
FRACTIONAL SHORTENING: 31 (ref 28–44)
INTERVENTRICULAR SEPTUM IN DIASTOLE (PARASTERNAL SHORT AXIS VIEW): 1.1 CM
INTERVENTRICULAR SEPTUM: 1.1 CM (ref 0.6–1.1)
LAAS-AP2: 28 CM2
LEFT ATRIUM AREA SYSTOLE SINGLE PLANE A4C: 17.5 CM2
LEFT ATRIUM SIZE: 4.1 CM
LEFT INTERNAL DIMENSION IN SYSTOLE: 4.1 CM (ref 2.1–4)
LEFT VENTRICULAR INTERNAL DIMENSION IN DIASTOLE: 5.9 CM (ref 3.5–6)
LEFT VENTRICULAR POSTERIOR WALL IN END DIASTOLE: 1.1 CM
LEFT VENTRICULAR STROKE VOLUME: 101 ML
LVSV (TEICH): 101 ML
MV E'TISSUE VEL-SEP: 11 CM/S
MV PEAK A VEL: 0.58 M/S
MV PEAK E VEL: 56 CM/S
MV STENOSIS PRESSURE HALF TIME: 54 MS
MV VALVE AREA P 1/2 METHOD: 4.1
RA PRESSURE ESTIMATED: 8 MMHG
RIGHT ATRIUM AREA SYSTOLE A4C: 23.1 CM2
RIGHT VENTRICLE ID DIMENSION: 3.5 CM
RV PSP: 27 MMHG
SINOTUBULAR JUNCTION: 3.5 CM
SL CV LEFT ATRIUM LENGTH A2C: 6.6 CM
SL CV LV EF: 55
SL CV PED ECHO LEFT VENTRICLE DIASTOLIC VOLUME (MOD BIPLANE) 2D: 174 ML
SL CV PED ECHO LEFT VENTRICLE SYSTOLIC VOLUME (MOD BIPLANE) 2D: 73 ML
SL CV SINUS OF VALSALVA 2D: 4.2 CM
STJ: 3.5 CM
TR MAX PG: 19 MMHG
TR PEAK VELOCITY: 2.2 M/S
TRICUSPID ANNULAR PLANE SYSTOLIC EXCURSION: 2.1 CM
TRICUSPID VALVE PEAK REGURGITATION VELOCITY: 2.18 M/S

## 2024-04-17 PROCEDURE — 93306 TTE W/DOPPLER COMPLETE: CPT | Performed by: INTERNAL MEDICINE

## 2024-04-17 PROCEDURE — 93306 TTE W/DOPPLER COMPLETE: CPT

## 2024-04-18 ENCOUNTER — TELEPHONE (OUTPATIENT)
Dept: FAMILY MEDICINE CLINIC | Facility: CLINIC | Age: 49
End: 2024-04-18

## 2024-04-18 DIAGNOSIS — H40.1130 PRIMARY OPEN ANGLE GLAUCOMA OF BOTH EYES, UNSPECIFIED GLAUCOMA STAGE: Primary | ICD-10-CM

## 2024-04-18 NOTE — TELEPHONE ENCOUNTER
"Left on voicemail. Dr. Cruz's patient    \"Hi, this is Bro Roldan's birthday [birth year]. I'm calling because I have a referral for psychiatric services for that type of medication to be prescribed and I am striking out I I've been on the phone all day just getting denied left and right either. Long story short, I'm having the hardest time finding a psychiatrist that's within 25 miles of me to go. If you have any recommendations is my question. Without telling you my whole story with doctor Yehuda has anybody he refers people to and I know policy from many places. This to say call your insurance company had a number on back of your card and they'll tell you everybody available in your area. I've done all that and trust me it was a dead end. So I'm trying again. See if anybody has some magical mystery pocket psychiatrist that they can refer me to where I can just get the help I need because that would be fantastic. If not, I understand. If it's just standard, check your Internet change company. I just, I guess don't even bother. But my number is [phone] if you're able to help at all. Thank you. If zackery. Thank you. I'll just keep mary ann away over here. Alright. Take care. Bye.\"  "

## 2024-04-19 ENCOUNTER — TELEPHONE (OUTPATIENT)
Dept: INTERNAL MEDICINE CLINIC | Facility: CLINIC | Age: 49
End: 2024-04-19

## 2024-04-19 NOTE — TELEPHONE ENCOUNTER
----- Message from Roberto Blackman DO sent at 4/18/2024  3:35 PM EDT -----  Echo noted mild dilation of the aortic root.  We will continue to follow this.

## 2024-04-19 NOTE — TELEPHONE ENCOUNTER
I spoke to the patient and He asked for the message below to be disregarded.  He was a bit frustrated but currently waiting on a call back from a few places.

## 2024-04-22 ENCOUNTER — TELEPHONE (OUTPATIENT)
Dept: FAMILY MEDICINE CLINIC | Facility: CLINIC | Age: 49
End: 2024-04-22

## 2024-04-22 NOTE — TELEPHONE ENCOUNTER
Spoke to patient he stated he never received any call or any voice mail         Can you please try to reach back out to patient to schedule an apt. He is in need of services his number which is the one on file is [phone]    Thank you

## 2024-04-23 ENCOUNTER — TELEPHONE (OUTPATIENT)
Age: 49
End: 2024-04-23

## 2024-04-23 ENCOUNTER — TELEPHONE (OUTPATIENT)
Dept: FAMILY MEDICINE CLINIC | Facility: CLINIC | Age: 49
End: 2024-04-23

## 2024-04-23 NOTE — TELEPHONE ENCOUNTER
Pt called and is wondering if labs can be placed for him to complete before his appt on Friday. He would like his regular panel and he also mentioned he has fatty liver so he would like for his liver enzymes to be checked as well. Thanks

## 2024-04-25 NOTE — TELEPHONE ENCOUNTER
Patient called the RX Refill Line. Message is being forwarded to the office.     Patient is return ingcall from the office, stated he miss a call and it may have to do with his labs.    Related message blood work was completed 2 months ago in February, patient stated it was February 2023    Please contact patient [phone]

## 2024-04-26 ENCOUNTER — OFFICE VISIT (OUTPATIENT)
Dept: INTERNAL MEDICINE CLINIC | Facility: CLINIC | Age: 49
End: 2024-04-26
Payer: COMMERCIAL

## 2024-04-26 ENCOUNTER — TELEPHONE (OUTPATIENT)
Age: 49
End: 2024-04-26

## 2024-04-26 VITALS
DIASTOLIC BLOOD PRESSURE: 82 MMHG | HEART RATE: 63 BPM | TEMPERATURE: 98 F | WEIGHT: 315 LBS | BODY MASS INDEX: 38.36 KG/M2 | HEIGHT: 76 IN | SYSTOLIC BLOOD PRESSURE: 130 MMHG | OXYGEN SATURATION: 99 %

## 2024-04-26 DIAGNOSIS — I10 ESSENTIAL HYPERTENSION: ICD-10-CM

## 2024-04-26 DIAGNOSIS — E78.00 HYPERCHOLESTEROLEMIA: ICD-10-CM

## 2024-04-26 DIAGNOSIS — G47.00 INSOMNIA, UNSPECIFIED TYPE: ICD-10-CM

## 2024-04-26 DIAGNOSIS — F90.0 ATTENTION DEFICIT HYPERACTIVITY DISORDER (ADHD), PREDOMINANTLY INATTENTIVE TYPE: ICD-10-CM

## 2024-04-26 DIAGNOSIS — L24.7 IRRITANT CONTACT DERMATITIS DUE TO PLANTS, EXCEPT FOOD: ICD-10-CM

## 2024-04-26 DIAGNOSIS — Z23 ENCOUNTER FOR IMMUNIZATION: Primary | ICD-10-CM

## 2024-04-26 DIAGNOSIS — R06.83 SNORING: ICD-10-CM

## 2024-04-26 PROCEDURE — 99214 OFFICE O/P EST MOD 30 MIN: CPT | Performed by: INTERNAL MEDICINE

## 2024-04-26 RX ORDER — DORZOLAMIDE HCL 20 MG/ML
SOLUTION/ DROPS OPHTHALMIC
COMMUNITY
Start: 2024-04-24

## 2024-04-26 RX ORDER — DEXTROAMPHETAMINE SACCHARATE, AMPHETAMINE ASPARTATE, DEXTROAMPHETAMINE SULFATE AND AMPHETAMINE SULFATE 5; 5; 5; 5 MG/1; MG/1; MG/1; MG/1
20 TABLET ORAL
Qty: 30 TABLET | Refills: 0 | Status: SHIPPED | OUTPATIENT
Start: 2024-04-26 | End: 2024-05-26

## 2024-04-26 RX ORDER — PREDNISONE 20 MG/1
TABLET ORAL
Qty: 20 TABLET | Refills: 0 | Status: SHIPPED | OUTPATIENT
Start: 2024-04-26

## 2024-04-26 RX ORDER — ZOLPIDEM TARTRATE 5 MG/1
5 TABLET ORAL
Qty: 30 TABLET | Refills: 0 | Status: SHIPPED | OUTPATIENT
Start: 2024-04-26

## 2024-04-26 NOTE — TELEPHONE ENCOUNTER
Reached out to pt in regards to routine referral and adding to proper wait list. LVM for pt to contact intake dept.

## 2024-04-26 NOTE — TELEPHONE ENCOUNTER
Reason for call:   [x] Prior Auth  [] Other:     Caller:  [x] Patient  [] Pharmacy  Name:   Address:   Callback Number:     Medication: Adderall 20 MG    Ordering Provider:   [x] PCP/Provider - [facility]  [] Speciality/Provider -     Has the patient tried other medications and failed? If failed, which medications did they fail?    [] No   [] Yes -     Is the patient's insurance updated in EPIC?   [x] Yes   [] No     Is a copy of the patient's insurance scanned in EPIC?   [x] Yes   [] No

## 2024-04-26 NOTE — ASSESSMENT & PLAN NOTE
The patient was seen and examined and noted to have issues with an elevated BP and we will continue to follow  Continue the Valsartan 160 mg Qday  Check the Creatinine

## 2024-04-26 NOTE — PROGRESS NOTES
Assessment/Plan:    Problem List Items Addressed This Visit     Essential hypertension     The patient was seen and examined and noted to have issues with an elevated BP and we will continue to follow  Continue the Valsartan 160 mg Qday  Check the Creatinine         Relevant Orders    Comprehensive metabolic panel    CBC and differential    Hypercholesterolemia     Follow up on his LDL cholesterol         Relevant Orders    Lipid Panel with Direct LDL reflex    Attention deficit hyperactivity disorder (ADHD), predominantly inattentive type     Covering until the patient is seen by Psyche regarding this         Relevant Medications    amphetamine-dextroamphetamine (ADDERALL, 20MG,) 20 mg tablet    zolpidem (AMBIEN) 5 mg tablet   Other Visit Diagnoses     Encounter for immunization    -  Primary    Snoring        Home sleep study  Noted snoring and the patient is obese  He notes daytime somnolence   Agreeable to home sleep study    Relevant Orders    Ambulatory Referral to Sleep Medicine    Insomnia, unspecified type        Continue the Zolpidem    Relevant Medications    zolpidem (AMBIEN) 5 mg tablet    Irritant contact dermatitis due to plants, except food        Relevant Medications    predniSONE 20 mg tablet           Diagnoses and all orders for this visit:    Encounter for immunization    Hypercholesterolemia  -     Lipid Panel with Direct LDL reflex; Future    Essential hypertension  -     Comprehensive metabolic panel; Future  -     CBC and differential; Future    Snoring  Comments:  Home sleep study  Noted snoring and the patient is obese  He notes daytime somnolence   Agreeable to home sleep study  Orders:  -     Ambulatory Referral to Sleep Medicine; Future    Insomnia, unspecified type  Comments:  Continue the Zolpidem  Orders:  -     zolpidem (AMBIEN) 5 mg tablet; Take 1 tablet (5 mg total) by mouth daily at bedtime as needed for sleep    Attention deficit hyperactivity disorder (ADHD), predominantly  inattentive type  -     amphetamine-dextroamphetamine (ADDERALL, 20MG,) 20 mg tablet; Take 1 tablet (20 mg total) by mouth 2 (two) times a day Max Daily Amount: 40 mg    Irritant contact dermatitis due to plants, except food  -     predniSONE 20 mg tablet; 3 Tabs PO QDay x 3 Days, Then 2 Tabs PO QDay x 3 Days, Then 1 Tab PO QDay x 3 Days, Then 1/2 Tab PO QDay x 4 Days, Then Stop    Other orders  -     dorzolamide (TRUSOPT) 2 % ophthalmic solution        Essential hypertension  The patient was seen and examined and noted to have issues with an elevated BP and we will continue to follow  Continue the Valsartan 160 mg Qday  Check the Creatinine    Attention deficit hyperactivity disorder (ADHD), predominantly inattentive type  Covering until the patient is seen by Psyche regarding this    Hypercholesterolemia  Follow up on his LDL cholesterol        Subjective:      Patient ID: Bro Roldan is a 48 y.o. male.    The patient was seen and examined and noted to have issues with an rash on his bilateral upper extremity.        The following portions of the patient's history were reviewed and updated as appropriate:   He has a past medical history of Allergic, Anxiety, Arthritis, Asthma, Depression, GERD (gastroesophageal reflux disease), Hypertension, Inflammatory bowel disease, Obesity, and Visual impairment.,  does not have any pertinent problems on file.,   has a past surgical history that includes Eye surgery and Knee surgery (2006).,  family history is not on file.,   reports that he has quit smoking. He has never been exposed to tobacco smoke. He has never used smokeless tobacco. He reports that he does not currently use alcohol. He reports current drug use. Drug: Marijuana.,  is allergic to cat hair extract and pollen extract..  Current Outpatient Medications   Medication Sig Dispense Refill   • albuterol (ProAir HFA) 90 mcg/act inhaler Inhale 1 puff every 4 (four) hours as needed for wheezing or shortness of breath  20.1 g 3   • amphetamine-dextroamphetamine (ADDERALL, 20MG,) 20 mg tablet Take 1 tablet (20 mg total) by mouth 2 (two) times a day Max Daily Amount: 40 mg 30 tablet 0   • cetirizine (ZyrTEC) 10 mg tablet Take 10 mg by mouth every morning     • Dextroamphetamine Sulfate 20 MG TABS Take 1 tablet (20 mg total) by mouth in the morning Max Daily Amount: 20 mg 30 tablet 0   • dorzolamide (TRUSOPT) 2 % ophthalmic solution      • famotidine (PEPCID) 40 MG tablet Take 1 tablet (40 mg total) by mouth every morning 90 tablet 3   • fluticasone (FLONASE) 50 mcg/act nasal spray 2 sprays into each nostril daily 16 g 0   • hydrOXYzine HCL (ATARAX) 25 mg tablet Take 1 tablet (25 mg total) by mouth 3 (three) times a day as needed for anxiety 90 tablet 3   • naproxen (NAPROSYN) 500 mg tablet Take 1 tablet (500 mg total) by mouth 2 (two) times a day as needed for moderate pain 180 tablet 0   • Omega-3 Fatty Acids (fish oil) 1,000 mg Take 2 capsules (2,000 mg total) by mouth daily 60 capsule 11   • omeprazole (PriLOSEC) 40 MG capsule Take 1 capsule (40 mg total) by mouth daily 90 capsule 3   • oxazepam (SERAX) 15 mg capsule Take 15 mg by mouth daily as needed for anxiety     • predniSONE 20 mg tablet 3 Tabs PO QDay x 3 Days, Then 2 Tabs PO QDay x 3 Days, Then 1 Tab PO QDay x 3 Days, Then 1/2 Tab PO QDay x 4 Days, Then Stop 20 tablet 0   • Rosuvastatin Calcium 20 MG CPSP Take 20 mg by mouth in the morning 90 capsule 30   • Semaglutide-Weight Management (WEGOVY) 0.25 MG/0.5ML Inject 0.5 mL (0.25 mg total) under the skin once a week for 28 days 2 mL 0   • [START ON 4/27/2024] Semaglutide-Weight Management (WEGOVY) 0.5 MG/0.5ML Inject 0.5 mL (0.5 mg total) under the skin once a week for 28 days Do not start before April 27, 2024. 2 mL 0   • [START ON 5/25/2024] Semaglutide-Weight Management (WEGOVY) 1 MG/0.5ML Inject 0.5 mL (1 mg total) under the skin once a week for 28 days Do not start before May 25, 2024. 2 mL 0   • [START ON 6/22/2024]  "Semaglutide-Weight Management (WEGOVY) 1.7 MG/0.75ML Inject 0.75 mL (1.7 mg total) under the skin once a week for 28 days Do not start before June 22, 2024. 3 mL 0   • [START ON 7/20/2024] Semaglutide-Weight Management (WEGOVY) 2.4 MG/0.75ML Inject 0.75 mL (2.4 mg total) under the skin once a week for 28 days Do not start before July 20, 2024. 3 mL 0   • valsartan (DIOVAN) 160 mg tablet Take 1 tablet (160 mg total) by mouth daily 90 tablet 3   • zolpidem (AMBIEN) 5 mg tablet Take 1 tablet (5 mg total) by mouth daily at bedtime as needed for sleep 30 tablet 0   • methylPREDNISolone 4 MG tablet therapy pack Use as directed on package (Patient not taking: Reported on 4/26/2024) 21 each 0     No current facility-administered medications for this visit.       Review of Systems   Constitutional:  Negative for chills, fatigue and fever.   HENT: Negative.     Respiratory:  Negative for cough, chest tightness and shortness of breath.    Cardiovascular:  Negative for chest pain and palpitations.   Gastrointestinal:  Negative for abdominal pain, constipation, diarrhea, nausea and vomiting.   Genitourinary: Negative.    Musculoskeletal:  Negative for back pain and myalgias.   Skin:  Positive for rash.   Neurological: Negative.    Psychiatric/Behavioral:  Negative for dysphoric mood. The patient is not nervous/anxious.          Objective:  Vitals:    04/26/24 1325   BP: 130/82   Pulse: 63   Temp: 98 °F (36.7 °C)   TempSrc: Tympanic   SpO2: 99%   Weight: (!) 146 kg (322 lb 12.8 oz)   Height: 6' 4\" (1.93 m)     Body mass index is 39.29 kg/m².     Physical Exam  Vitals and nursing note reviewed.   Constitutional:       Appearance: He is well-developed.   HENT:      Head: Normocephalic and atraumatic.   Eyes:      Pupils: Pupils are equal, round, and reactive to light.   Cardiovascular:      Rate and Rhythm: Normal rate and regular rhythm.      Heart sounds: Normal heart sounds. No murmur heard.  Pulmonary:      Effort: Pulmonary " effort is normal.      Breath sounds: Normal breath sounds. No stridor. No rales.   Abdominal:      General: Bowel sounds are normal. There is no distension.      Palpations: Abdomen is soft.      Tenderness: There is no abdominal tenderness.   Musculoskeletal:         General: No deformity. Normal range of motion.      Cervical back: Normal range of motion and neck supple.   Skin:     General: Skin is warm and dry.      Findings: Erythema and rash present.   Neurological:      Mental Status: He is alert and oriented to person, place, and time.          PHQ-2/9 Depression Screening    Little interest or pleasure in doing things: 0 - not at all  Feeling down, depressed, or hopeless: 0 - not at all  PHQ-2 Score: 0  PHQ-2 Interpretation: Negative depression screen

## 2024-04-29 NOTE — TELEPHONE ENCOUNTER
PA for amphetamine-dextroamphetamine (ADDERALL, 20MG,) 20 mg tablet      Submitted via    []CMM-KEY   [][email or web address]-Case ID #   []Faxed to plan   [x]Other website -NfyicuWG-Byroyxdim-NE: [id number]  []Phone call Case ID #     Office notes sent, clinical questions answered. Awaiting determination    Turnaround time for your insurance to make a decision on your Prior Authorization can take 7-21 business days.

## 2024-04-29 NOTE — TELEPHONE ENCOUNTER
PA for amphetamine-dextroamphetamine (ADDERALL, 20MG,) Denied    Reason:        Message sent to provider pool Yes    Denial letter scanned into Media Yes    Appeal started No    (Provider will need to decide if appeal is warranted and send clinical documentation to PA team for initiation.)

## 2024-05-01 ENCOUNTER — TELEPHONE (OUTPATIENT)
Dept: SLEEP CENTER | Facility: CLINIC | Age: 49
End: 2024-05-01

## 2024-05-01 DIAGNOSIS — R06.83 SNORING: Primary | ICD-10-CM

## 2024-05-01 LAB — COLOGUARD RESULT REPORTABLE: NEGATIVE

## 2024-05-01 NOTE — TELEPHONE ENCOUNTER
Referral placed for a home sleep study.  Patient's insurance does not cover a home sleep study.      Order has been changed to in-lab diagnostic sleep study.     Ordering and co-signing providers notified.

## 2024-05-02 ENCOUNTER — TELEPHONE (OUTPATIENT)
Age: 49
End: 2024-05-02

## 2024-05-02 NOTE — TELEPHONE ENCOUNTER
Reason for call:   [] Refill   [x] Prior Auth  [x] Other: Pt called and stated pharmacy don't have medication wegovy in stock but he knows they are going to request PA for this medication and he would like to get the process started to be prepared when the medication comes back in stock. Please review.    Office:   [x] PCP/Provider -  Roberto Blackman, DO   [] Specialty/Provider -     Medication: (WEGOVY) 0.5 MG/0.5ML       Pharmacy: [id number] - [place] - [address] [place] [address]

## 2024-05-02 NOTE — TELEPHONE ENCOUNTER
Reason for call:   [] Refill   [x] Prior Auth  [x] Other: Pt called refill line to check PA status. Pt was informed PA was denied. Pt requested to resend to PA team for an appeal. Please review, Thank you.  Office:   [] PCP/Provider - Roberto Blackman,    [] Specialty/Provider -     Medication: (ADDERALL, 20MG,)       Pharmacy: [facility] #[id number] [address]

## 2024-05-03 ENCOUNTER — LAB (OUTPATIENT)
Dept: LAB | Facility: CLINIC | Age: 49
End: 2024-05-03
Payer: COMMERCIAL

## 2024-05-03 DIAGNOSIS — R06.83 SNORING: Primary | ICD-10-CM

## 2024-05-03 DIAGNOSIS — I10 ESSENTIAL HYPERTENSION: ICD-10-CM

## 2024-05-03 DIAGNOSIS — E78.00 HYPERCHOLESTEROLEMIA: ICD-10-CM

## 2024-05-03 LAB
ALBUMIN SERPL BCP-MCNC: 4.2 G/DL (ref 3.5–5)
ALP SERPL-CCNC: 44 U/L (ref 34–104)
ALT SERPL W P-5'-P-CCNC: 36 U/L (ref 7–52)
ANION GAP SERPL CALCULATED.3IONS-SCNC: 8 MMOL/L (ref 4–13)
AST SERPL W P-5'-P-CCNC: 26 U/L (ref 13–39)
BASOPHILS # BLD AUTO: 0.01 THOUSANDS/ÂΜL (ref 0–0.1)
BASOPHILS NFR BLD AUTO: 0 % (ref 0–1)
BILIRUB SERPL-MCNC: 1.16 MG/DL (ref 0.2–1)
BUN SERPL-MCNC: 20 MG/DL (ref 5–25)
CALCIUM SERPL-MCNC: 9.3 MG/DL (ref 8.4–10.2)
CHLORIDE SERPL-SCNC: 100 MMOL/L (ref 96–108)
CHOLEST SERPL-MCNC: 178 MG/DL
CO2 SERPL-SCNC: 31 MMOL/L (ref 21–32)
CREAT SERPL-MCNC: 0.99 MG/DL (ref 0.6–1.3)
EOSINOPHIL # BLD AUTO: 0.08 THOUSAND/ÂΜL (ref 0–0.61)
EOSINOPHIL NFR BLD AUTO: 1 % (ref 0–6)
ERYTHROCYTE [DISTWIDTH] IN BLOOD BY AUTOMATED COUNT: 13.8 % (ref 11.6–15.1)
GFR SERPL CREATININE-BSD FRML MDRD: 89 ML/MIN/1.73SQ M
GLUCOSE P FAST SERPL-MCNC: 106 MG/DL (ref 65–99)
HCT VFR BLD AUTO: 48.1 % (ref 36.5–49.3)
HDLC SERPL-MCNC: 58 MG/DL
HGB BLD-MCNC: 15.7 G/DL (ref 12–17)
IMM GRANULOCYTES # BLD AUTO: 0.03 THOUSAND/UL (ref 0–0.2)
IMM GRANULOCYTES NFR BLD AUTO: 0 % (ref 0–2)
LDLC SERPL CALC-MCNC: 102 MG/DL (ref 0–100)
LYMPHOCYTES # BLD AUTO: 3.53 THOUSANDS/ÂΜL (ref 0.6–4.47)
LYMPHOCYTES NFR BLD AUTO: 43 % (ref 14–44)
MCH RBC QN AUTO: 30.5 PG (ref 26.8–34.3)
MCHC RBC AUTO-ENTMCNC: 32.6 G/DL (ref 31.4–37.4)
MCV RBC AUTO: 94 FL (ref 82–98)
MONOCYTES # BLD AUTO: 0.79 THOUSAND/ÂΜL (ref 0.17–1.22)
MONOCYTES NFR BLD AUTO: 10 % (ref 4–12)
NEUTROPHILS # BLD AUTO: 3.81 THOUSANDS/ÂΜL (ref 1.85–7.62)
NEUTS SEG NFR BLD AUTO: 46 % (ref 43–75)
NRBC BLD AUTO-RTO: 0 /100 WBCS
PLATELET # BLD AUTO: 303 THOUSANDS/UL (ref 149–390)
PMV BLD AUTO: 10 FL (ref 8.9–12.7)
POTASSIUM SERPL-SCNC: 4.6 MMOL/L (ref 3.5–5.3)
PROT SERPL-MCNC: 6.8 G/DL (ref 6.4–8.4)
RBC # BLD AUTO: 5.14 MILLION/UL (ref 3.88–5.62)
SODIUM SERPL-SCNC: 139 MMOL/L (ref 135–147)
TRIGL SERPL-MCNC: 89 MG/DL
WBC # BLD AUTO: 8.25 THOUSAND/UL (ref 4.31–10.16)

## 2024-05-03 PROCEDURE — 80061 LIPID PANEL: CPT

## 2024-05-03 PROCEDURE — 36415 COLL VENOUS BLD VENIPUNCTURE: CPT

## 2024-05-03 PROCEDURE — 80053 COMPREHEN METABOLIC PANEL: CPT

## 2024-05-03 PROCEDURE — 85025 COMPLETE CBC W/AUTO DIFF WBC: CPT

## 2024-05-06 ENCOUNTER — TELEPHONE (OUTPATIENT)
Dept: INTERNAL MEDICINE CLINIC | Facility: CLINIC | Age: 49
End: 2024-05-06

## 2024-05-06 NOTE — TELEPHONE ENCOUNTER
----- Message from Roberto Blackman DO sent at 5/6/2024 12:31 AM EDT -----  Mild changes on labs and we will discuss at our next follow up

## 2024-05-07 NOTE — TELEPHONE ENCOUNTER
PA for WEGOVY 0.5 MG/0.5ML     Submitted via    []CMM-KEY   []SurescriAgrivida-Case ID #   []Faxed to plan   [x]Other website TnetzmKO-Zwdbrfjwo-GI: [id number]  []Phone call Case ID #     Office notes sent, clinical questions answered. Awaiting determination    Turnaround time for your insurance to make a decision on your Prior Authorization can take 7-21 business days.

## 2024-05-08 NOTE — TELEPHONE ENCOUNTER
PA for  WEGOVY 0.5 MG/0.5ML  Denied    Reason:                  Message sent to provider pool Yes    Denial letter scanned into Media Yes    Appeal started No    (Provider will need to decide if appeal is warranted and send clinical documentation to PA team for initiation.)

## 2024-05-08 NOTE — TELEPHONE ENCOUNTER
Diana[facility], called to report prior auth for Wegovy has been denied. She will be faxing reasons for denial over to the office along with appeal instructions.

## 2024-05-09 ENCOUNTER — TELEPHONE (OUTPATIENT)
Age: 49
End: 2024-05-09

## 2024-05-09 NOTE — TELEPHONE ENCOUNTER
Pt called back stating the PA was missing some checked off boxes stating he had been cancelled regarding a healthy lifestyle and a few other boxes that needs to be checked off    Pt also states that even if the Wegovy is approved, no pharmacy in the area has it in stock.    He has changed his mind and is willing to go to Weight management.  Please refer

## 2024-05-10 DIAGNOSIS — E66.09 CLASS 2 OBESITY DUE TO EXCESS CALORIES WITH BODY MASS INDEX (BMI) OF 39.0 TO 39.9 IN ADULT, UNSPECIFIED WHETHER SERIOUS COMORBIDITY PRESENT: Primary | ICD-10-CM

## 2024-05-28 ENCOUNTER — TELEPHONE (OUTPATIENT)
Age: 49
End: 2024-05-28

## 2024-05-28 DIAGNOSIS — F90.0 ATTENTION DEFICIT HYPERACTIVITY DISORDER (ADHD), PREDOMINANTLY INATTENTIVE TYPE: ICD-10-CM

## 2024-05-28 NOTE — TELEPHONE ENCOUNTER
Pt called in he is requesting a refill for his medication - amphetamine-dextroamphetamine (ADDERALL, 20MG,) 20 mg tablet,   [facility] and he will pay for it out of pocket due to waiting time for a PA. Pt also request that the next refill of medication be sent ahead of time to his insurance company for PA. Thank you

## 2024-05-30 RX ORDER — DEXTROAMPHETAMINE SACCHARATE, AMPHETAMINE ASPARTATE, DEXTROAMPHETAMINE SULFATE AND AMPHETAMINE SULFATE 5; 5; 5; 5 MG/1; MG/1; MG/1; MG/1
1 TABLET ORAL 2 TIMES DAILY
Qty: 30 TABLET | Refills: 0 | Status: SHIPPED | OUTPATIENT
Start: 2024-05-30

## 2024-06-02 DIAGNOSIS — J30.9 ALLERGIC RHINITIS, UNSPECIFIED SEASONALITY, UNSPECIFIED TRIGGER: ICD-10-CM

## 2024-06-02 RX ORDER — FLUTICASONE PROPIONATE 50 MCG
2 SPRAY, SUSPENSION (ML) NASAL DAILY
Qty: 16 G | Refills: 5 | Status: SHIPPED | OUTPATIENT
Start: 2024-06-02

## 2024-06-10 ENCOUNTER — HOSPITAL ENCOUNTER (OUTPATIENT)
Dept: SLEEP CENTER | Facility: CLINIC | Age: 49
Discharge: HOME | End: 2024-06-10
Payer: COMMERCIAL

## 2024-06-10 DIAGNOSIS — R06.83 SNORING: ICD-10-CM

## 2024-06-10 PROCEDURE — 95810 POLYSOM 6/> YRS 4/> PARAM: CPT | Performed by: INTERNAL MEDICINE

## 2024-06-10 PROCEDURE — 95810 POLYSOM 6/> YRS 4/> PARAM: CPT

## 2024-06-11 DIAGNOSIS — R06.83 SNORING: Primary | ICD-10-CM

## 2024-06-11 PROBLEM — G47.33 OSA (OBSTRUCTIVE SLEEP APNEA): Status: ACTIVE | Noted: 2024-06-11

## 2024-06-11 NOTE — PROGRESS NOTES
Sleep Study Documentation    Pre-Sleep Study       Sleep testing procedure explained to patient:YES    Patient napped prior to study:NO    Caffeine:Dayshift worker after 12PM.  Caffeine use:NO    Alcohol:Dayshift workers after 5PM: Alcohol use:NO    Typical day for patient:NO       Study Documentation    Sleep Study Indications: snoring and BMI > 30    Sleep Study: Diagnostic   Snore:Moderate  Supplemental O2: no    Minimum SaO2 86  Baseline SaO2 91    EKG abnormalities: no     EEG abnormalities: no    Were abnormal behaviors in sleep observed:NO    Is Total Sleep Study Recording Time < 2 hours: N/A    Is Total Sleep Study Recording Time > 2 hours but study is incomplete: N/A    Is Total Sleep Study Recording Time 6 hours or more but sleep was not obtained: NO        Post-Sleep Study    Medication used at bedtime or during sleep study:NO    Patient reports time it took to fall asleep:greater than 60 minutes    Patient reports waking up during study:3 or more times.  Patient reports returning to sleep in 10 to 30 minutes.    Patient reports sleeping 2 to 4 hours without dreaming.    Does the Patient feel this is a typical night of sleep:worse than usual    Patient rated sleepiness: Somewhat sleepy or tired    PAP treatment:no.

## 2024-06-12 DIAGNOSIS — R06.83 SNORING: ICD-10-CM

## 2024-06-12 DIAGNOSIS — G47.33 OSA (OBSTRUCTIVE SLEEP APNEA): Primary | ICD-10-CM

## 2024-06-12 NOTE — RESULT ENCOUNTER NOTE
Sleep study (+) and the patient should consider a CPAP titration.  Order placed for follow up study.

## 2024-06-21 ENCOUNTER — HOSPITAL ENCOUNTER (OUTPATIENT)
Dept: SLEEP CENTER | Facility: CLINIC | Age: 49
Discharge: HOME | End: 2024-06-21
Payer: COMMERCIAL

## 2024-06-21 DIAGNOSIS — R06.83 SNORING: ICD-10-CM

## 2024-06-21 DIAGNOSIS — G47.33 OSA (OBSTRUCTIVE SLEEP APNEA): ICD-10-CM

## 2024-06-21 PROCEDURE — 95811 POLYSOM 6/>YRS CPAP 4/> PARM: CPT

## 2024-06-21 PROCEDURE — 95811 POLYSOM 6/>YRS CPAP 4/> PARM: CPT | Performed by: INTERNAL MEDICINE

## 2024-06-22 NOTE — PROGRESS NOTES
Sleep Study Documentation    Pre-Sleep Study       Sleep testing procedure explained to patient:YES    Patient napped prior to study:NO    Caffeine:Dayshift worker after 12PM.  Caffeine use:NO    Alcohol:Dayshift workers after 5PM: Alcohol use:NO    Typical day for patient:NO       Study Documentation    Sleep Study Indications: JORGE    Sleep Study: Treatment   Optimal PAP pressure: 12cm  Leak:Small  Snore:Eliminated  REM Obtained:yes  Supplemental O2: no    Minimum SaO2 88%  Baseline SaO2 94.9%  PAP mask tried (list all) MediumResmed AirFit F20   PAP mask choice (final) MediumResmed AirFit F20   PAP mask type:full face  PAP pressure at which snoring was eliminated 10cm  Minimum SaO2 at final PAP pressure 90%  ETCO2:No  CPAP changed to BiPAP:No    Mode of Therapy:CPAP    EKG abnormalities: no     EEG abnormalities: no    Were abnormal behaviors in sleep observed:NO    Is Total Sleep Study Recording Time < 2 hours: N/A    Is Total Sleep Study Recording Time > 2 hours but study is incomplete: N/A    Is Total Sleep Study Recording Time 6 hours or more but sleep was not obtained: NO    Patient classification: employed       Post-Sleep Study    Medication used at bedtime or during sleep study:YES prescription sleep aid    Patient reports time it took to fall asleep:30 to 60 minutes    Patient reports waking up during study:1 to 2 times.  Patient reports returning to sleep in 10 to 30 minutes.    Patient reports sleeping 4 to 6 hours with dreaming.    Does the Patient feel this is a typical night of sleep:worse than usual    Patient rated sleepiness: Somewhat sleepy or tired    PAP treatment:yes: Post PAP treatment patient reports feeling unsure if a change is noted and  would wear PAP mask at home.

## 2024-07-02 ENCOUNTER — OFFICE VISIT (OUTPATIENT)
Dept: OBGYN CLINIC | Facility: CLINIC | Age: 49
End: 2024-07-02
Payer: COMMERCIAL

## 2024-07-02 ENCOUNTER — TELEPHONE (OUTPATIENT)
Age: 49
End: 2024-07-02

## 2024-07-02 VITALS
BODY MASS INDEX: 38.36 KG/M2 | HEIGHT: 76 IN | SYSTOLIC BLOOD PRESSURE: 151 MMHG | DIASTOLIC BLOOD PRESSURE: 82 MMHG | HEART RATE: 81 BPM | WEIGHT: 315 LBS

## 2024-07-02 DIAGNOSIS — M17.0 PRIMARY OSTEOARTHRITIS OF BOTH KNEES: Primary | ICD-10-CM

## 2024-07-02 PROCEDURE — 99213 OFFICE O/P EST LOW 20 MIN: CPT | Performed by: ORTHOPAEDIC SURGERY

## 2024-07-02 PROCEDURE — 20610 DRAIN/INJ JOINT/BURSA W/O US: CPT | Performed by: ORTHOPAEDIC SURGERY

## 2024-07-02 RX ORDER — BUPIVACAINE HYDROCHLORIDE 2.5 MG/ML
4 INJECTION, SOLUTION INFILTRATION; PERINEURAL
Status: COMPLETED | OUTPATIENT
Start: 2024-07-02 | End: 2024-07-02

## 2024-07-02 RX ORDER — TRIAMCINOLONE ACETONIDE 40 MG/ML
80 INJECTION, SUSPENSION INTRA-ARTICULAR; INTRAMUSCULAR
Status: COMPLETED | OUTPATIENT
Start: 2024-07-02 | End: 2024-07-02

## 2024-07-02 RX ADMIN — BUPIVACAINE HYDROCHLORIDE 4 ML: 2.5 INJECTION, SOLUTION INFILTRATION; PERINEURAL at 11:00

## 2024-07-02 RX ADMIN — TRIAMCINOLONE ACETONIDE 80 MG: 40 INJECTION, SUSPENSION INTRA-ARTICULAR; INTRAMUSCULAR at 11:00

## 2024-07-02 NOTE — PROGRESS NOTES
4Assessment/Plan:   Diagnoses and all orders for this visit:    Primary osteoarthritis of both knees  -     Large joint arthrocentesis: bilateral knee         The patient continues to experience symptoms related to osteoarthritis of the bilateral knee. Discussed weight loss management with patient at time of visit. The patient states that he recently lost weight through diet control. He was offered and accepted an injection(s) of Kenalog and Marcaine to his Bilateral knee(s) for symptomatic relief of pain and inflammation. Patient tolerated the treatment(s) well. Ice and post injection protocol advised. Weightbearing activities as tolerated. He will be seen for follow-up in 3 months for re-evaluation and consideration for repeat injections as necessary. Patient expresses understanding and is in agreement with this treatment plan. The patient was given the opportunity to ask questions or present concerns.    The patient has degenerative joint disease of his bilateral knees.  Under aseptic technique, both knees were injected with Kenalog and Marcaine.  He tolerated procedures quite well.  Return back in 3 months for reevaluation      Subjective:   Patient ID: Bro Roldan  1975     HPI  Patient is a 48 y.o. male who presents for of his Bilateral knee(s). The patient was last seen on 4/2/2024, where he received a CS injection for treatment of osteoarthritis of his Bilateral knee(s). The patient reports relief following the previous CS injection. However, the pain returned approximately 1-2 weeks ago. He states that the pain is not as severe at this appointment. On today's presentation, he reports pain along the medial aspect of his knee(s). He states that the pain is exacerbated by weight bearing activities, knee flexion, and ambulating stairs. He reports recent weightloss. He denies feelings of instability or weakness. He denies numbness or tingling.        The following portions of the patient's history were  reviewed and updated as appropriate:  Past medical history, past surgical history, Family history, social history, current medications and allergies    Past Medical History:   Diagnosis Date    Allergic     Anxiety     Arthritis     Asthma     Depression     GERD (gastroesophageal reflux disease)     Hypertension     Inflammatory bowel disease     Obesity     Visual impairment        Past Surgical History:   Procedure Laterality Date    EYE SURGERY      2021/2022    KNEE SURGERY  2006       History reviewed. No pertinent family history.    Social History     Socioeconomic History    Marital status: Single     Spouse name: None    Number of children: None    Years of education: None    Highest education level: None   Occupational History    None   Tobacco Use    Smoking status: Former     Passive exposure: Never    Smokeless tobacco: Never   Vaping Use    Vaping status: Never Used   Substance and Sexual Activity    Alcohol use: Not Currently     Comment: occasional    Drug use: Yes     Types: Marijuana     Comment: Medical Marijuana    Sexual activity: Not Currently     Partners: Female   Other Topics Concern    None   Social History Narrative    None     Social Determinants of Health     Financial Resource Strain: Not on file   Food Insecurity: Not on file   Transportation Needs: Not on file   Physical Activity: Not on file   Stress: Not on file   Social Connections: Not on file   Intimate Partner Violence: Not on file   Housing Stability: Not on file         Current Outpatient Medications:     albuterol (ProAir HFA) 90 mcg/act inhaler, Inhale 1 puff every 4 (four) hours as needed for wheezing or shortness of breath, Disp: 20.1 g, Rfl: 3    amphetamine-dextroamphetamine (ADDERALL) 20 mg tablet, take 1 tablet by mouth twice a day, Disp: 30 tablet, Rfl: 0    cetirizine (ZyrTEC) 10 mg tablet, Take 10 mg by mouth every morning, Disp: , Rfl:     dorzolamide (TRUSOPT) 2 % ophthalmic solution, , Disp: , Rfl:     famotidine  (PEPCID) 40 MG tablet, Take 1 tablet (40 mg total) by mouth every morning, Disp: 90 tablet, Rfl: 3    fluticasone (FLONASE) 50 mcg/act nasal spray, instill 2 sprays into each nostril once daily, Disp: 16 g, Rfl: 5    hydrOXYzine HCL (ATARAX) 25 mg tablet, Take 1 tablet (25 mg total) by mouth 3 (three) times a day as needed for anxiety, Disp: 90 tablet, Rfl: 3    Omega-3 Fatty Acids (fish oil) 1,000 mg, Take 2 capsules (2,000 mg total) by mouth daily, Disp: 60 capsule, Rfl: 11    omeprazole (PriLOSEC) 40 MG capsule, Take 1 capsule (40 mg total) by mouth daily, Disp: 90 capsule, Rfl: 3    oxazepam (SERAX) 15 mg capsule, Take 15 mg by mouth daily as needed for anxiety, Disp: , Rfl:     Rosuvastatin Calcium 20 MG CPSP, Take 20 mg by mouth in the morning, Disp: 90 capsule, Rfl: 30    Semaglutide-Weight Management (WEGOVY) 1.7 MG/0.75ML, Inject 0.75 mL (1.7 mg total) under the skin once a week for 28 days Do not start before June 22, 2024., Disp: 3 mL, Rfl: 0    [START ON 7/20/2024] Semaglutide-Weight Management (WEGOVY) 2.4 MG/0.75ML, Inject 0.75 mL (2.4 mg total) under the skin once a week for 28 days Do not start before July 20, 2024., Disp: 3 mL, Rfl: 0    valsartan (DIOVAN) 160 mg tablet, Take 1 tablet (160 mg total) by mouth daily, Disp: 90 tablet, Rfl: 3    zolpidem (AMBIEN) 5 mg tablet, Take 1 tablet (5 mg total) by mouth daily at bedtime as needed for sleep, Disp: 30 tablet, Rfl: 0    Dextroamphetamine Sulfate 20 MG TABS, Take 1 tablet (20 mg total) by mouth in the morning Max Daily Amount: 20 mg, Disp: 30 tablet, Rfl: 0    methylPREDNISolone 4 MG tablet therapy pack, Use as directed on package (Patient not taking: Reported on 4/26/2024), Disp: 21 each, Rfl: 0    naproxen (NAPROSYN) 500 mg tablet, Take 1 tablet (500 mg total) by mouth 2 (two) times a day as needed for moderate pain, Disp: 180 tablet, Rfl: 0    predniSONE 20 mg tablet, 3 Tabs PO QDay x 3 Days, Then 2 Tabs PO QDay x 3 Days, Then 1 Tab PO QDay x 3  "Days, Then 1/2 Tab PO QDay x 4 Days, Then Stop, Disp: 20 tablet, Rfl: 0    Allergies   Allergen Reactions    Cat Hair Extract     Pollen Extract        Review of Systems   Constitutional:  Negative for chills and fever.   HENT:  Negative for ear pain and sore throat.    Eyes:  Negative for pain and visual disturbance.   Respiratory:  Negative for cough and shortness of breath.    Cardiovascular:  Negative for chest pain and palpitations.   Gastrointestinal:  Negative for abdominal pain and vomiting.   Genitourinary:  Negative for dysuria and hematuria.   Musculoskeletal:  Negative for arthralgias and back pain.   Skin:  Negative for color change and rash.   Neurological:  Negative for seizures and syncope.   All other systems reviewed and are negative.       Objective:  /82   Pulse 81   Ht 6' 4\" (1.93 m)   Wt (!) 146 kg (322 lb)   BMI 39.20 kg/m²     Ortho Exam  bilateral knee(s) -   Patient ambulates with steady gait pattern  Uses No assistive device  No anatomical deformity  Skin is warm and dry to touch with no signs of erythema, ecchymosis, or infection   No soft tissue swelling or effusion noted  ROM (0° - 115°)   Strength: 5/5 throughout  TTP over medial joint line, TTP over lateral joint line, TTP over pes anserine bursa, no popliteal fullness appreciated on exam   Flexor and extensor mechanisms are intact   Knee is stable to varus and valgus stress  - Lachman's  - Anterior Drawer, - Posterior Drawer  - Nilesh's  Patella tracks centrally with palpable crepitus  Calf compartments are soft and supple  - John's sign  2+ DP and PT pulses with brisk capillary refill to the toes  Sural, saphenous, tibial, superficial, and deep peroneal motor and sensory distributions intact  Sensation light touch intact distally      Physical Exam  HENT:      Head: Normocephalic and atraumatic.      Nose: Nose normal.   Eyes:      Conjunctiva/sclera: Conjunctivae normal.   Cardiovascular:      Rate and Rhythm: Normal " rate.   Pulmonary:      Effort: Pulmonary effort is normal.   Musculoskeletal:      Cervical back: Neck supple.   Skin:     General: Skin is warm and dry.      Capillary Refill: Capillary refill takes less than 2 seconds.   Neurological:      Mental Status: He is alert and oriented to person, place, and time.   Psychiatric:         Mood and Affect: Mood normal.         Behavior: Behavior normal.          Diagnostic Test Review:  No new imaging at time of visit.     Large joint arthrocentesis: bilateral knee  Universal Protocol:  Consent: Verbal consent obtained.  Risks and benefits: risks, benefits and alternatives were discussed  Consent given by: patient  Timeout called at: 7/2/2024 11:40 AM.  Patient understanding: patient states understanding of the procedure being performed  Site marked: the operative site was marked  Radiology Images displayed and confirmed. If images not available, report reviewed: imaging studies available  Patient identity confirmed: verbally with patient  Supporting Documentation  Indications: pain and joint swelling   Procedure Details  Location: knee - bilateral knee  Ultrasound guidance: no  Approach: anterolateral    Medications (Right): 4 mL bupivacaine 0.25 %; 80 mg triamcinolone acetonide 40 mg/mLMedications (Left): 4 mL bupivacaine 0.25 %; 80 mg triamcinolone acetonide 40 mg/mL   Patient tolerance: patient tolerated the procedure well with no immediate complications  Dressing:  Sterile dressing applied             Scribe Attestation      I,:  Jes Hollis am acting as a scribe while in the presence of the attending physician.:       I,:  Abdiel Herbert DO personally performed the services described in this documentation    as scribed in my presence.:

## 2024-07-02 NOTE — TELEPHONE ENCOUNTER
Pt called regarding sleep study that was completed on 6/22. States he was told the doctor will be reaching out to him and a representative regarding the cpap machine but he has not heard from anyone.

## 2024-07-03 ENCOUNTER — TELEPHONE (OUTPATIENT)
Age: 49
End: 2024-07-03

## 2024-07-03 NOTE — TELEPHONE ENCOUNTER
Reason for call:   [] Refill   [x] Prior Auth  [] Other:     Office:   [x] PCP/Provider - Dr Roberto Montero  [] Specialty/Provider -       Patient is requesting an appeal for the semaglutide-weight management 0.5 mg.    Patient states he has been waiting since APRIL for this medication prior auth to be approved. States he was told by insurance that the doctor did not check the box indicating that he discussed alternative methods with the patient and if this is done then insurance will approve the medication.          Please contact patient at [phone]

## 2024-07-04 NOTE — TELEPHONE ENCOUNTER
PA for WEGOVY 1.7 MG/0.75ML     Submitted via    []CMM-KEY   []SurescriRecommend-Case ID #   []Faxed to plan   [x]Other website [facility]: [id number]  []Phone call Case ID #     Office notes sent, clinical questions answered. Awaiting determination    Turnaround time for your insurance to make a decision on your Prior Authorization can take 7-21 business days.

## 2024-07-05 LAB

## 2024-07-05 NOTE — TELEPHONE ENCOUNTER
Patient called in because he spoke with his insurance regarding appeal for wegovy. They advised him that they would need a note indicating 2 specifics.     That he was counseled on other weight loss options   That he is set up for weight management services and his appointment is on 7/30.

## 2024-07-24 ENCOUNTER — TELEPHONE (OUTPATIENT)
Age: 49
End: 2024-07-24

## 2024-07-24 ENCOUNTER — TELEPHONE (OUTPATIENT)
Dept: INTERNAL MEDICINE CLINIC | Facility: CLINIC | Age: 49
End: 2024-07-24

## 2024-07-29 ENCOUNTER — PATIENT OUTREACH (OUTPATIENT)
Dept: BARIATRICS | Facility: CLINIC | Age: 49
End: 2024-07-29

## 2024-07-29 NOTE — PROGRESS NOTES
"Weight History     Highest Weight: 355#  Lowest Weight: 180#    Stated lost 55# since mid-April     Past Attempts at Weight Loss: weighing food, counting kcal, gym daily, \"intense and exhausting\" efforts; now trying to move more and get steps in and eat better, but stated that he does well during the day, but doesn't eat until late at night    Gained a lot of weight this winter when sick with sepsis.     Food Recall  Breakfast: 6:30-8 AM: egg white and cheese omelet; possibly with lunch meat  Snack: none   Lunch: \"if it even happens,\" eats when he can, mostly early afternoon, but if too late, waits until dinner; may have leftovers  Snack: granola bar, chips, cheese sticks, crackers, sunflower seeds, almonds, Greek yogurt   Dinner: 9 PM- Hello Fresh meal which is currently on hold; generally meat, vegetable, starch OR snacks instead of eating dinner (leftovers or can of soup)  Snack: Greek yogurt with granola OR ice cream    Beverages: plain seltzer- 3 cans daily; water- one bottle, coffee- 24 oz, energy drink multiple x per week- 1 can   Volume of Beverage Intake: >=77 oz    Frequency of Dining out: twice monthly     Would the patient benefit from visit with BH specialist?:   Yes- admitted that at times he turns to food for comfort    Physical Activity Intake  Activity: walking, biking; often gets 10,000 steps daily; trying to move more   Frequency of Exercise: daily  Physical Limitations to Exercise: bad arthritis in knees for which he gets cortisone shots    Review of Programs  Overview of medical weight management programs provided?: Yes   Is patient interested in discussing medication?: Yes   Is patient interested in discussing bariatric surgery?: No  Does patient know which pathway they are interested in?: Yes- conservative    "

## 2024-07-30 ENCOUNTER — TELEPHONE (OUTPATIENT)
Dept: BARIATRICS | Facility: CLINIC | Age: 49
End: 2024-07-30

## 2024-07-30 ENCOUNTER — OFFICE VISIT (OUTPATIENT)
Dept: BARIATRICS | Facility: CLINIC | Age: 49
End: 2024-07-30
Payer: COMMERCIAL

## 2024-07-30 VITALS
DIASTOLIC BLOOD PRESSURE: 82 MMHG | HEIGHT: 76 IN | OXYGEN SATURATION: 99 % | BODY MASS INDEX: 36.8 KG/M2 | TEMPERATURE: 98.6 F | RESPIRATION RATE: 20 BRPM | SYSTOLIC BLOOD PRESSURE: 146 MMHG | WEIGHT: 302.2 LBS | HEART RATE: 72 BPM

## 2024-07-30 DIAGNOSIS — I10 ESSENTIAL HYPERTENSION: ICD-10-CM

## 2024-07-30 DIAGNOSIS — K21.9 GASTROESOPHAGEAL REFLUX DISEASE WITHOUT ESOPHAGITIS: ICD-10-CM

## 2024-07-30 DIAGNOSIS — E66.01 SEVERE OBESITY (HCC): Primary | ICD-10-CM

## 2024-07-30 DIAGNOSIS — R73.01 ELEVATED FASTING GLUCOSE: ICD-10-CM

## 2024-07-30 DIAGNOSIS — G47.33 OSA (OBSTRUCTIVE SLEEP APNEA): ICD-10-CM

## 2024-07-30 PROCEDURE — 3079F DIAST BP 80-89 MM HG: CPT | Performed by: PHYSICIAN ASSISTANT

## 2024-07-30 PROCEDURE — 99204 OFFICE O/P NEW MOD 45 MIN: CPT | Performed by: PHYSICIAN ASSISTANT

## 2024-07-30 PROCEDURE — 3077F SYST BP >= 140 MM HG: CPT | Performed by: PHYSICIAN ASSISTANT

## 2024-07-30 NOTE — ASSESSMENT & PLAN NOTE
-Discussed options of HealthyCORE-Intensive Lifestyle Intervention Program, Very Low Calorie Diet-VLCD, Conservative Program, Inge-En-Y Gastric Bypass, and Vertical Sleeve Gastrectomy and the role of weight loss medications.  -Initial weight loss goal of 5-10% weight loss for improved health  -Screening labs: reviewed CMP, Lipid panel, TSH, HgbA1c. Check fasting insulin  -Patient is interested in pursuing Conservative Program  -Calorie goals, sample menu, portion size guidelines, and food logging reviewed with the patient.     -Patient also has interest in trial of AOMs. Discussed importance of consistent dietary and lifestyle changes for long term success. Has interest in Wegovy. SE profile reviewed  -denies personal hx of pancreatitis or family hx MEN2 or MTC  -medication agreement signed

## 2024-07-30 NOTE — PROGRESS NOTES
Assessment/Plan:    Severe obesity (HCC)  -Discussed options of HealthyCORE-Intensive Lifestyle Intervention Program, Very Low Calorie Diet-VLCD, Conservative Program, Inge-En-Y Gastric Bypass, and Vertical Sleeve Gastrectomy and the role of weight loss medications.  -Initial weight loss goal of 5-10% weight loss for improved health  -Screening labs: reviewed CMP, Lipid panel, TSH, HgbA1c. Check fasting insulin  -Patient is interested in pursuing Conservative Program  -Calorie goals, sample menu, portion size guidelines, and food logging reviewed with the patient.     -Patient also has interest in trial of AOMs. Discussed importance of consistent dietary and lifestyle changes for long term success. Has interest in Wegovy. SE profile reviewed  -denies personal hx of pancreatitis or family hx MEN2 or MTC  -medication agreement signed    JORGE (obstructive sleep apnea)  -Awaiting CPAP  -Can improve with weight loss    Essential hypertension  -On Valsartan  -can improve with weight loss    Gastroesophageal reflux disease  -On Omeprazole  -can improve with weight loss  -avoid food triggers, large portion sizes        Goals:    Food log (ie.) [email or web address],[email or web address],etc.   No sugary beverages. At least 64oz of water daily.  Increase physical activity by 10 minutes daily. Gradually increase physical activity to a goal of 5 days per week for 30 minutes of MODERATE intensity PLUS 2 days per week of FULL BODY resistance training  8827-1070 calories per day  5-10 servings of fruits and vegetables per day  25-35 grams of dietary fiber per day    Visit [email or web address] for further information/injection instructions. Please eat small frequent meals to help reduce nausea. Lemon water and saltine crackers may help with this. If you experience fever, nausea/vomiting, and pain radiating to your back this may be a sign of pancreatitis. Please have ER evaluation with this occurs.      Follow up in  "approximately  4 months  with Non-Surgical Physician/Advanced Practitioner and 8 weeks with MA for vitals check    Diagnoses and all orders for this visit:    Severe obesity (HCC)  -     Semaglutide-Weight Management (WEGOVY) 0.25 MG/0.5ML; Inject 0.5 mL (0.25 mg total) under the skin once a week for 4 doses    BMI 36.0-36.9,adult  -     Ambulatory Referral to Weight Management    Elevated fasting glucose  -     Insulin, fasting; Future    JORGE (obstructive sleep apnea)    Essential hypertension    Gastroesophageal reflux disease without esophagitis          Subjective:   Chief Complaint   Patient presents with    Consult       Patient ID: Bro Roldan  is a 48 y.o. male with excess weight/obesity here to pursue weight managment.    Past Medical History:   Diagnosis Date    Allergic     Anxiety     Arthritis     Asthma     Depression     GERD (gastroesophageal reflux disease)     Hypertension     Inflammatory bowel disease     Obesity     Visual impairment          HPI:  Obesity/Excess Weight:  Severity: Severe  Onset: Late teenage years after being started on a bunch of medication for depression. Able to lose weight in adulthood and then regained after divorce    Modifiers: weighing food, counting calories, gym daily, \"intense and exhausting efforts\"    Contributing factors: Depression and ETOH , working long hours - 16hrs/day and eating fast food  Associated symptoms: increased joint pain, decreased self esteem, and decreased mobility, sweating easily    Goals: 210-220 lbs, wants to be more active with his kids  Highest: 355 lbs    Sleep: wakes frequently, Awaiting CPAP for JORGE: diagnosed in June  Occupation: unemployed currently  Smoking: denies  ETOH: 3 drinks per month  Exercise: walking, biking, often gets 10k steps daily    Food recall reviewed and copied from Juliana Alvarado RD patient outreach documentation    Food Recall  Breakfast: 6:30-8 AM: egg white and cheese omelet; possibly with lunch meat  Snack: " "none   Lunch: \"if it even happens,\" eats when he can, mostly early afternoon, but if too late, waits until dinner; may have leftovers  Snack: granola bar, chips, cheese sticks, crackers, sunflower seeds, almonds, Greek yogurt   Dinner: 9 PM- Hello Fresh meal which is currently on hold; generally meat, vegetable, starch OR snacks instead of eating dinner (leftovers or can of soup)  Snack: Greek yogurt with granola OR ice cream     Beverages: plain seltzer- 3 cans daily; water- one bottle, coffee- 24 oz, energy drink multiple x per week- 1 can   Volume of Beverage Intake: >=77 oz    Colonoscopy: completed cologuard 2024    The following portions of the patient's history were reviewed and updated as appropriate: allergies, current medications, past family history, past medical history, past social history, past surgical history, and problem list.    Review of Systems   Constitutional:  Negative for chills and fever.   HENT:  Negative for sore throat.    Respiratory:  Positive for shortness of breath. Negative for cough.    Cardiovascular:  Negative for chest pain and palpitations.   Gastrointestinal:  Negative for abdominal pain, nausea and vomiting.   Genitourinary:  Negative for dysuria.   Musculoskeletal:  Positive for arthralgias.   Skin:  Negative for rash.   Neurological:  Negative for dizziness and headaches.   Psychiatric/Behavioral:  Positive for dysphoric mood (speaks to counselor weekly).        Objective:    /82 (BP Location: Left arm, Patient Position: Sitting, Cuff Size: Large)   Pulse 72   Temp 98.6 °F (37 °C)   Resp 20   Ht 6' 4\" (1.93 m)   Wt (!) 137 kg (302 lb 3.2 oz)   SpO2 99%   BMI 36.78 kg/m²     Physical Exam  Vitals and nursing note reviewed.   Constitutional:       General: He is not in acute distress.     Appearance: He is obese. He is not ill-appearing or toxic-appearing.   HENT:      Head: Normocephalic and atraumatic.      Mouth/Throat:      Mouth: Mucous membranes are moist. "   Eyes:      General: No scleral icterus.  Pulmonary:      Effort: Pulmonary effort is normal. No respiratory distress.   Abdominal:      Comments: protuberant   Musculoskeletal:         General: Normal range of motion.   Skin:     General: Skin is dry.      Coloration: Skin is not jaundiced.   Neurological:      General: No focal deficit present.      Mental Status: He is alert and oriented to person, place, and time. Mental status is at baseline.   Psychiatric:         Mood and Affect: Mood normal.         Behavior: Behavior normal.         Thought Content: Thought content normal.         Judgment: Judgment normal.

## 2024-07-30 NOTE — PATIENT INSTRUCTIONS
Goals:    Food log (ie.) [email or web address],[email or web address],[email or web address],[employer].[email or web address],etc.   No sugary beverages. At least 64oz of water daily.  Increase physical activity by 10 minutes daily. Gradually increase physical activity to a goal of 5 days per week for 30 minutes of MODERATE intensity PLUS 2 days per week of FULL BODY resistance training  3423-5814 calories per day  5-10 servings of fruits and vegetables per day  25-35 grams of dietary fiber per day    Visit [email or web address] for further information/injection instructions. Please eat small frequent meals to help reduce nausea. Lemon water and saltine crackers may help with this. If you experience fever, nausea/vomiting, and pain radiating to your back this may be a sign of pancreatitis. Please have ER evaluation with this occurs.

## 2024-07-30 NOTE — TELEPHONE ENCOUNTER
CHETAN SANCHEZ (Key: [id number]) - [id number]  Wegovy 0.25MG/0.5ML auto-injectors    Awaiting response for auth

## 2024-07-31 ENCOUNTER — TELEPHONE (OUTPATIENT)
Age: 49
End: 2024-07-31

## 2024-07-31 NOTE — TELEPHONE ENCOUNTER
Sana called in stating Wegovy prior auth has been approved and will send the confirmation docs VIA fax.

## 2024-08-01 NOTE — TELEPHONE ENCOUNTER
Patient has been notified I did fax over the necessary documents to [employer] at [phone].

## 2024-08-06 ENCOUNTER — TELEPHONE (OUTPATIENT)
Dept: INTERNAL MEDICINE CLINIC | Facility: CLINIC | Age: 49
End: 2024-08-06

## 2024-08-06 ENCOUNTER — APPOINTMENT (OUTPATIENT)
Dept: LAB | Facility: CLINIC | Age: 49
End: 2024-08-06
Payer: COMMERCIAL

## 2024-08-06 DIAGNOSIS — R73.01 ELEVATED FASTING GLUCOSE: ICD-10-CM

## 2024-08-06 LAB
DME PARACHUTE DELIVERY DATE REQUESTED: NORMAL
DME PARACHUTE ITEM DESCRIPTION: NORMAL
DME PARACHUTE ORDER STATUS: NORMAL
DME PARACHUTE SUPPLIER NAME: NORMAL
DME PARACHUTE SUPPLIER PHONE: NORMAL
INSULIN SERPL-ACNC: 14.13 UIU/ML (ref 1.9–23)

## 2024-08-06 PROCEDURE — 36415 COLL VENOUS BLD VENIPUNCTURE: CPT

## 2024-08-06 PROCEDURE — 83525 ASSAY OF INSULIN: CPT

## 2024-08-06 NOTE — TELEPHONE ENCOUNTER
Patient came in looking for status on cpap machine. Looks like it was canceled on 8/5. Do you know anything about this?

## 2024-08-06 NOTE — TELEPHONE ENCOUNTER
Recent CPAP script.  Can you call [facility] and make sure everything is okay with the script and this is going to be sent to the patient.  If there are issues find out what there are

## 2024-08-06 NOTE — TELEPHONE ENCOUNTER
Patient called on status on the CPAP machine, as per what I could see it showed in process. This was not a good enough answer for the patient, which then started yelling and using profanity.  [facility] out the status of [facility] machine. I was informed that they needed the Sleep Study results.  I faxed over the sleep study results to F# [phone]. It takes 24-48 hours for their system to be updated. Once they receive the sleep study, it is then sent for an authorization, once they get an authorization, the patient will then be called.

## 2024-08-07 ENCOUNTER — APPOINTMENT (OUTPATIENT)
Dept: RADIOLOGY | Facility: CLINIC | Age: 49
End: 2024-08-07
Payer: COMMERCIAL

## 2024-08-07 ENCOUNTER — APPOINTMENT (OUTPATIENT)
Dept: LAB | Facility: CLINIC | Age: 49
End: 2024-08-07
Payer: COMMERCIAL

## 2024-08-07 ENCOUNTER — OFFICE VISIT (OUTPATIENT)
Dept: INTERNAL MEDICINE CLINIC | Facility: CLINIC | Age: 49
End: 2024-08-07
Payer: COMMERCIAL

## 2024-08-07 VITALS
OXYGEN SATURATION: 99 % | BODY MASS INDEX: 36.61 KG/M2 | SYSTOLIC BLOOD PRESSURE: 136 MMHG | HEIGHT: 76 IN | HEART RATE: 83 BPM | DIASTOLIC BLOOD PRESSURE: 90 MMHG | TEMPERATURE: 97.7 F | WEIGHT: 300.6 LBS

## 2024-08-07 DIAGNOSIS — G47.33 OSA (OBSTRUCTIVE SLEEP APNEA): ICD-10-CM

## 2024-08-07 DIAGNOSIS — I51.7 LVH (LEFT VENTRICULAR HYPERTROPHY): ICD-10-CM

## 2024-08-07 DIAGNOSIS — I51.7 BIATRIAL ENLARGEMENT: ICD-10-CM

## 2024-08-07 DIAGNOSIS — R06.09 DOE (DYSPNEA ON EXERTION): ICD-10-CM

## 2024-08-07 DIAGNOSIS — I77.810 AORTIC ROOT DILATATION (HCC): ICD-10-CM

## 2024-08-07 DIAGNOSIS — R42 LIGHTHEADEDNESS: ICD-10-CM

## 2024-08-07 DIAGNOSIS — R53.83 OTHER FATIGUE: ICD-10-CM

## 2024-08-07 DIAGNOSIS — R00.2 PALPITATIONS: ICD-10-CM

## 2024-08-07 DIAGNOSIS — I10 ESSENTIAL HYPERTENSION: ICD-10-CM

## 2024-08-07 DIAGNOSIS — I10 ESSENTIAL HYPERTENSION: Primary | ICD-10-CM

## 2024-08-07 LAB
ALBUMIN SERPL BCG-MCNC: 4.2 G/DL (ref 3.5–5)
ALP SERPL-CCNC: 39 U/L (ref 34–104)
ALT SERPL W P-5'-P-CCNC: 21 U/L (ref 7–52)
ANION GAP SERPL CALCULATED.3IONS-SCNC: 7 MMOL/L (ref 4–13)
AST SERPL W P-5'-P-CCNC: 17 U/L (ref 13–39)
BASOPHILS # BLD AUTO: 0.03 THOUSANDS/ÂΜL (ref 0–0.1)
BASOPHILS NFR BLD AUTO: 0 % (ref 0–1)
BILIRUB SERPL-MCNC: 0.74 MG/DL (ref 0.2–1)
BNP SERPL-MCNC: 26 PG/ML (ref 0–100)
BUN SERPL-MCNC: 18 MG/DL (ref 5–25)
CALCIUM SERPL-MCNC: 9.8 MG/DL (ref 8.4–10.2)
CHLORIDE SERPL-SCNC: 103 MMOL/L (ref 96–108)
CO2 SERPL-SCNC: 29 MMOL/L (ref 21–32)
CREAT SERPL-MCNC: 0.84 MG/DL (ref 0.6–1.3)
D DIMER PPP FEU-MCNC: 8.07 UG/ML FEU
EOSINOPHIL # BLD AUTO: 0.1 THOUSAND/ÂΜL (ref 0–0.61)
EOSINOPHIL NFR BLD AUTO: 1 % (ref 0–6)
ERYTHROCYTE [DISTWIDTH] IN BLOOD BY AUTOMATED COUNT: 12.9 % (ref 11.6–15.1)
GFR SERPL CREATININE-BSD FRML MDRD: 103 ML/MIN/1.73SQ M
GLUCOSE SERPL-MCNC: 88 MG/DL (ref 65–140)
HCT VFR BLD AUTO: 48 % (ref 36.5–49.3)
HGB BLD-MCNC: 16.3 G/DL (ref 12–17)
IMM GRANULOCYTES # BLD AUTO: 0.03 THOUSAND/UL (ref 0–0.2)
IMM GRANULOCYTES NFR BLD AUTO: 0 % (ref 0–2)
LYMPHOCYTES # BLD AUTO: 3.33 THOUSANDS/ÂΜL (ref 0.6–4.47)
LYMPHOCYTES NFR BLD AUTO: 42 % (ref 14–44)
MCH RBC QN AUTO: 31.7 PG (ref 26.8–34.3)
MCHC RBC AUTO-ENTMCNC: 34 G/DL (ref 31.4–37.4)
MCV RBC AUTO: 93 FL (ref 82–98)
MONOCYTES # BLD AUTO: 0.7 THOUSAND/ÂΜL (ref 0.17–1.22)
MONOCYTES NFR BLD AUTO: 9 % (ref 4–12)
NEUTROPHILS # BLD AUTO: 3.82 THOUSANDS/ÂΜL (ref 1.85–7.62)
NEUTS SEG NFR BLD AUTO: 48 % (ref 43–75)
NRBC BLD AUTO-RTO: 0 /100 WBCS
PLATELET # BLD AUTO: 204 THOUSANDS/UL (ref 149–390)
PMV BLD AUTO: 10 FL (ref 8.9–12.7)
POTASSIUM SERPL-SCNC: 4.7 MMOL/L (ref 3.5–5.3)
PROT SERPL-MCNC: 7.2 G/DL (ref 6.4–8.4)
RBC # BLD AUTO: 5.14 MILLION/UL (ref 3.88–5.62)
SODIUM SERPL-SCNC: 139 MMOL/L (ref 135–147)
TSH SERPL DL<=0.05 MIU/L-ACNC: 1.15 UIU/ML (ref 0.45–4.5)
WBC # BLD AUTO: 8.01 THOUSAND/UL (ref 4.31–10.16)

## 2024-08-07 PROCEDURE — 80053 COMPREHEN METABOLIC PANEL: CPT

## 2024-08-07 PROCEDURE — 85379 FIBRIN DEGRADATION QUANT: CPT

## 2024-08-07 PROCEDURE — 93000 ELECTROCARDIOGRAM COMPLETE: CPT | Performed by: INTERNAL MEDICINE

## 2024-08-07 PROCEDURE — 84443 ASSAY THYROID STIM HORMONE: CPT

## 2024-08-07 PROCEDURE — 85025 COMPLETE CBC W/AUTO DIFF WBC: CPT

## 2024-08-07 PROCEDURE — 83880 ASSAY OF NATRIURETIC PEPTIDE: CPT

## 2024-08-07 PROCEDURE — 36415 COLL VENOUS BLD VENIPUNCTURE: CPT

## 2024-08-07 PROCEDURE — 99214 OFFICE O/P EST MOD 30 MIN: CPT | Performed by: INTERNAL MEDICINE

## 2024-08-07 PROCEDURE — 71046 X-RAY EXAM CHEST 2 VIEWS: CPT

## 2024-08-07 RX ORDER — METOPROLOL SUCCINATE 50 MG/1
50 TABLET, EXTENDED RELEASE ORAL DAILY
COMMUNITY
End: 2024-08-09

## 2024-08-07 RX ORDER — DILTIAZEM HYDROCHLORIDE 180 MG/1
180 CAPSULE, COATED, EXTENDED RELEASE ORAL DAILY
Qty: 100 CAPSULE | Refills: 3 | Status: SHIPPED | OUTPATIENT
Start: 2024-08-07

## 2024-08-07 NOTE — PROGRESS NOTES
Ambulatory Visit  Name: Bro Roldan      : 1975      MRN: [id number]  Encounter Provider: Abdiel Acharya DO  Encounter Date: 2024   Encounter department: [facility]    Assessment & Plan   1. Essential hypertension  -     CBC and differential; Future  -     Comprehensive metabolic panel; Future  -     POCT ECG  -     TSH, 3rd generation; Future  -     diltiazem (CARDIZEM CD) 180 mg 24 hr capsule; Take 1 capsule (180 mg total) by mouth daily  -     XR chest pa & lateral; Future  -     D-dimer, quantitative; Future  -     B-Type Natriuretic Peptide(BNP); Future  -     Ambulatory Referral to Cardiology; Future  2. Biatrial enlargement  -     CBC and differential; Future  -     Comprehensive metabolic panel; Future  -     POCT ECG  -     TSH, 3rd generation; Future  -     diltiazem (CARDIZEM CD) 180 mg 24 hr capsule; Take 1 capsule (180 mg total) by mouth daily  -     XR chest pa & lateral; Future  -     D-dimer, quantitative; Future  -     B-Type Natriuretic Peptide(BNP); Future  -     Ambulatory Referral to Cardiology; Future  3. LVH (left ventricular hypertrophy)  -     CBC and differential; Future  -     Comprehensive metabolic panel; Future  -     POCT ECG  -     TSH, 3rd generation; Future  -     diltiazem (CARDIZEM CD) 180 mg 24 hr capsule; Take 1 capsule (180 mg total) by mouth daily  -     XR chest pa & lateral; Future  -     D-dimer, quantitative; Future  -     B-Type Natriuretic Peptide(BNP); Future  -     Ambulatory Referral to Cardiology; Future  4. Aortic root dilatation (HCC)  -     CBC and differential; Future  -     Comprehensive metabolic panel; Future  -     POCT ECG  -     TSH, 3rd generation; Future  -     diltiazem (CARDIZEM CD) 180 mg 24 hr capsule; Take 1 capsule (180 mg total) by mouth daily  -     XR chest pa & lateral; Future  -     D-dimer, quantitative; Future  -     B-Type Natriuretic Peptide(BNP); Future  -     Ambulatory Referral to Cardiology; Future  5.  Palpitations  -     CBC and differential; Future  -     Comprehensive metabolic panel; Future  -     POCT ECG  -     TSH, 3rd generation; Future  -     diltiazem (CARDIZEM CD) 180 mg 24 hr capsule; Take 1 capsule (180 mg total) by mouth daily  -     XR chest pa & lateral; Future  -     D-dimer, quantitative; Future  -     B-Type Natriuretic Peptide(BNP); Future  -     Ambulatory Referral to Cardiology; Future  6. NEELY (dyspnea on exertion)  -     CBC and differential; Future  -     Comprehensive metabolic panel; Future  -     POCT ECG  -     TSH, 3rd generation; Future  -     diltiazem (CARDIZEM CD) 180 mg 24 hr capsule; Take 1 capsule (180 mg total) by mouth daily  -     XR chest pa & lateral; Future  -     D-dimer, quantitative; Future  -     B-Type Natriuretic Peptide(BNP); Future  -     Ambulatory Referral to Cardiology; Future  7. Other fatigue  -     CBC and differential; Future  -     Comprehensive metabolic panel; Future  -     POCT ECG  -     TSH, 3rd generation; Future  -     diltiazem (CARDIZEM CD) 180 mg 24 hr capsule; Take 1 capsule (180 mg total) by mouth daily  -     XR chest pa & lateral; Future  -     D-dimer, quantitative; Future  -     B-Type Natriuretic Peptide(BNP); Future  -     Ambulatory Referral to Cardiology; Future  8. JORGE (obstructive sleep apnea)  -     CBC and differential; Future  -     Comprehensive metabolic panel; Future  -     POCT ECG  -     TSH, 3rd generation; Future  -     diltiazem (CARDIZEM CD) 180 mg 24 hr capsule; Take 1 capsule (180 mg total) by mouth daily  -     XR chest pa & lateral; Future  -     D-dimer, quantitative; Future  -     B-Type Natriuretic Peptide(BNP); Future  -     Ambulatory Referral to Cardiology; Future  9. Lightheadedness  -     Ambulatory Referral to Cardiology; Future     A/P; Stable with BP borderline high now. Wt is down. ?BP causing s/s or due another reason causing the palpitations and NEELY. PE unimpressive at this time. EKG w/o acute  findings.Asthma seems to be controlled. Is on adderall and ?causing the palpitations. Recent echo reviewed and no s/s of CHF currently. Will check labs and CXR. Will add CCB rather than a beta blocker due to asthma for BP and palpitations. May need a ziopatch. Pt to increase his AINSLEY use to see if that helps, but need to watch so it doesn't increase his HR. Needs to get on CPAP and will see what the hold up is. Would get a stress echo, but just had an echo.Will refer to cards. No s/s of DVT. RTC one week for f/u. May need PFT's if cards angle doesn't pan out.         History of Present Illness     WM with HTN and JORGE, but waiting to get on CPAP,  presents due to BP not being controlled on ARB, not feeling well, increased HR at times and NEELY. sBP above 150's. BP been going on for several months, but rest of s/s just the past week or so.  . Compliant with meds and no new meds or dietary changes. Denies associated CP,  edema, orthopnea, PND, some intermittent  lightheadedness. No stroke like events.         Review of Systems   Constitutional:  Positive for activity change. Negative for chills, diaphoresis, fatigue and fever.   Respiratory:  Positive for shortness of breath. Negative for cough, chest tightness and wheezing.    Cardiovascular:  Positive for palpitations. Negative for chest pain and leg swelling.   Gastrointestinal:  Negative for abdominal pain, constipation, diarrhea, nausea and vomiting.   Genitourinary:  Negative for difficulty urinating, dysuria and frequency.   Musculoskeletal:  Negative for arthralgias, gait problem and myalgias.   Neurological:  Positive for light-headedness. Negative for dizziness, seizures, syncope, weakness and headaches.   Psychiatric/Behavioral:  Negative for confusion, dysphoric mood and sleep disturbance. The patient is not nervous/anxious.        Objective     /90 (BP Location: Right arm, Patient Position: Sitting, Cuff Size: Adult)   Pulse 83   Temp 97.7 °F (36.5  "°C) (Tympanic)   Ht 6' 4\" (1.93 m)   Wt (!) 136 kg (300 lb 9.6 oz)   SpO2 99%   BMI 36.59 kg/m²     Physical Exam  Vitals and nursing note reviewed.   Constitutional:       General: He is not in acute distress.     Appearance: Normal appearance. He is obese. He is not ill-appearing.   HENT:      Head: Normocephalic and atraumatic.      Mouth/Throat:      Mouth: Mucous membranes are moist.   Eyes:      Extraocular Movements: Extraocular movements intact.      Conjunctiva/sclera: Conjunctivae normal.      Pupils: Pupils are equal, round, and reactive to light.   Cardiovascular:      Rate and Rhythm: Normal rate and regular rhythm.      Heart sounds: Normal heart sounds. No murmur heard.  Pulmonary:      Effort: Pulmonary effort is normal. No respiratory distress.      Breath sounds: Normal breath sounds. No wheezing, rhonchi or rales.   Neurological:      General: No focal deficit present.      Mental Status: He is alert and oriented to person, place, and time. Mental status is at baseline.   Psychiatric:         Mood and Affect: Mood normal.         Behavior: Behavior normal.         Thought Content: Thought content normal.         Judgment: Judgment normal.       Administrative Statements           "

## 2024-08-08 ENCOUNTER — HOSPITAL ENCOUNTER (OUTPATIENT)
Dept: CT IMAGING | Facility: HOSPITAL | Age: 49
End: 2024-08-08
Attending: INTERNAL MEDICINE
Payer: COMMERCIAL

## 2024-08-08 ENCOUNTER — TELEPHONE (OUTPATIENT)
Age: 49
End: 2024-08-08

## 2024-08-08 ENCOUNTER — TELEPHONE (OUTPATIENT)
Dept: INTERNAL MEDICINE CLINIC | Facility: CLINIC | Age: 49
End: 2024-08-08

## 2024-08-08 ENCOUNTER — APPOINTMENT (OUTPATIENT)
Dept: NON INVASIVE DIAGNOSTICS | Facility: HOSPITAL | Age: 49
End: 2024-08-08
Payer: COMMERCIAL

## 2024-08-08 ENCOUNTER — HOSPITAL ENCOUNTER (OUTPATIENT)
Facility: HOSPITAL | Age: 49
Setting detail: OBSERVATION
Discharge: HOME | End: 2024-08-09
Attending: EMERGENCY MEDICINE | Admitting: INTERNAL MEDICINE
Payer: COMMERCIAL

## 2024-08-08 DIAGNOSIS — R06.09 DOE (DYSPNEA ON EXERTION): Primary | ICD-10-CM

## 2024-08-08 DIAGNOSIS — I26.99 PE (PULMONARY THROMBOEMBOLISM) (HCC): Primary | ICD-10-CM

## 2024-08-08 DIAGNOSIS — I26.92 SADDLE PULMONARY EMBOLUS (HCC): ICD-10-CM

## 2024-08-08 DIAGNOSIS — R00.2 PALPITATIONS: ICD-10-CM

## 2024-08-08 DIAGNOSIS — I51.7 BIATRIAL ENLARGEMENT: ICD-10-CM

## 2024-08-08 DIAGNOSIS — R06.09 DYSPNEA ON EXERTION: ICD-10-CM

## 2024-08-08 DIAGNOSIS — R06.09 DOE (DYSPNEA ON EXERTION): ICD-10-CM

## 2024-08-08 DIAGNOSIS — R42 LIGHTHEADEDNESS: ICD-10-CM

## 2024-08-08 DIAGNOSIS — G47.33 OSA (OBSTRUCTIVE SLEEP APNEA): Primary | ICD-10-CM

## 2024-08-08 LAB
ANION GAP SERPL CALCULATED.3IONS-SCNC: 7 MMOL/L (ref 4–13)
AORTIC ROOT: 3.5 CM
APICAL FOUR CHAMBER EJECTION FRACTION: 57 %
APTT PPP: 171 SECONDS (ref 23–34)
APTT PPP: 27 SECONDS (ref 23–34)
BASOPHILS # BLD AUTO: 0.05 THOUSANDS/ÂΜL (ref 0–0.1)
BASOPHILS NFR BLD AUTO: 1 % (ref 0–1)
BSA FOR ECHO PROCEDURE: 2.61 M2
BUN SERPL-MCNC: 14 MG/DL (ref 5–25)
CALCIUM SERPL-MCNC: 9.8 MG/DL (ref 8.4–10.2)
CHLORIDE SERPL-SCNC: 101 MMOL/L (ref 96–108)
CO2 SERPL-SCNC: 27 MMOL/L (ref 21–32)
CREAT SERPL-MCNC: 0.93 MG/DL (ref 0.6–1.3)
E WAVE DECELERATION TIME: 225 MS
E/A RATIO: 0.79
EOSINOPHIL # BLD AUTO: 0.11 THOUSAND/ÂΜL (ref 0–0.61)
EOSINOPHIL NFR BLD AUTO: 1 % (ref 0–6)
ERYTHROCYTE [DISTWIDTH] IN BLOOD BY AUTOMATED COUNT: 12.8 % (ref 11.6–15.1)
FRACTIONAL SHORTENING: 29 (ref 28–44)
GFR SERPL CREATININE-BSD FRML MDRD: 96 ML/MIN/1.73SQ M
GLUCOSE SERPL-MCNC: 87 MG/DL (ref 65–140)
HCT VFR BLD AUTO: 49.9 % (ref 36.5–49.3)
HGB BLD-MCNC: 16.4 G/DL (ref 12–17)
IMM GRANULOCYTES # BLD AUTO: 0.02 THOUSAND/UL (ref 0–0.2)
IMM GRANULOCYTES NFR BLD AUTO: 0 % (ref 0–2)
INR PPP: 0.95 (ref 0.85–1.19)
INTERVENTRICULAR SEPTUM IN DIASTOLE (PARASTERNAL SHORT AXIS VIEW): 1.8 CM
INTERVENTRICULAR SEPTUM: 1.8 CM (ref 0.6–1.1)
LAAS-AP2: 13.1 CM2
LAAS-AP4: 12.2 CM2
LEFT ATRIUM SIZE: 3.7 CM
LEFT ATRIUM VOLUME (MOD BIPLANE): 36 ML
LEFT ATRIUM VOLUME INDEX (MOD BIPLANE): 13.8 ML/M2
LEFT INTERNAL DIMENSION IN SYSTOLE: 3.6 CM (ref 2.1–4)
LEFT VENTRICULAR INTERNAL DIMENSION IN DIASTOLE: 5.1 CM (ref 3.5–6)
LEFT VENTRICULAR POSTERIOR WALL IN END DIASTOLE: 1.6 CM
LEFT VENTRICULAR STROKE VOLUME: 69 ML
LVSV (TEICH): 69 ML
LYMPHOCYTES # BLD AUTO: 3.41 THOUSANDS/ÂΜL (ref 0.6–4.47)
LYMPHOCYTES NFR BLD AUTO: 37 % (ref 14–44)
MCH RBC QN AUTO: 31.3 PG (ref 26.8–34.3)
MCHC RBC AUTO-ENTMCNC: 32.9 G/DL (ref 31.4–37.4)
MCV RBC AUTO: 95 FL (ref 82–98)
MONOCYTES # BLD AUTO: 0.8 THOUSAND/ÂΜL (ref 0.17–1.22)
MONOCYTES NFR BLD AUTO: 9 % (ref 4–12)
MV E'TISSUE VEL-SEP: 9 CM/S
MV PEAK A VEL: 0.48 M/S
MV PEAK E VEL: 38 CM/S
MV STENOSIS PRESSURE HALF TIME: 65 MS
MV VALVE AREA P 1/2 METHOD: 3.38
NEUTROPHILS # BLD AUTO: 4.88 THOUSANDS/ÂΜL (ref 1.85–7.62)
NEUTS SEG NFR BLD AUTO: 52 % (ref 43–75)
NRBC BLD AUTO-RTO: 0 /100 WBCS
PLATELET # BLD AUTO: 214 THOUSANDS/UL (ref 149–390)
PMV BLD AUTO: 9.5 FL (ref 8.9–12.7)
POTASSIUM SERPL-SCNC: 4.6 MMOL/L (ref 3.5–5.3)
PROTHROMBIN TIME: 13.2 SECONDS (ref 12.3–15)
RBC # BLD AUTO: 5.24 MILLION/UL (ref 3.88–5.62)
RIGHT ATRIUM AREA SYSTOLE A4C: 24.6 CM2
RIGHT VENTRICLE ID DIMENSION: 4.3 CM
SL CV LEFT ATRIUM LENGTH A2C: 3.5 CM
SL CV PED ECHO LEFT VENTRICLE DIASTOLIC VOLUME (MOD BIPLANE) 2D: 124 ML
SL CV PED ECHO LEFT VENTRICLE SYSTOLIC VOLUME (MOD BIPLANE) 2D: 55 ML
SODIUM SERPL-SCNC: 135 MMOL/L (ref 135–147)
TR MAX PG: 22 MMHG
TR PEAK VELOCITY: 2.4 M/S
TRICUSPID ANNULAR PLANE SYSTOLIC EXCURSION: 2.2 CM
TRICUSPID VALVE PEAK REGURGITATION VELOCITY: 2.35 M/S
WBC # BLD AUTO: 9.27 THOUSAND/UL (ref 4.31–10.16)

## 2024-08-08 PROCEDURE — 36415 COLL VENOUS BLD VENIPUNCTURE: CPT

## 2024-08-08 PROCEDURE — 85730 THROMBOPLASTIN TIME PARTIAL: CPT | Performed by: NURSE PRACTITIONER

## 2024-08-08 PROCEDURE — 93308 TTE F-UP OR LMTD: CPT

## 2024-08-08 PROCEDURE — 93308 TTE F-UP OR LMTD: CPT | Performed by: INTERNAL MEDICINE

## 2024-08-08 PROCEDURE — 99283 EMERGENCY DEPT VISIT LOW MDM: CPT

## 2024-08-08 PROCEDURE — 93321 DOPPLER ECHO F-UP/LMTD STD: CPT | Performed by: INTERNAL MEDICINE

## 2024-08-08 PROCEDURE — 93005 ELECTROCARDIOGRAM TRACING: CPT

## 2024-08-08 PROCEDURE — 85025 COMPLETE CBC W/AUTO DIFF WBC: CPT

## 2024-08-08 PROCEDURE — 93970 EXTREMITY STUDY: CPT

## 2024-08-08 PROCEDURE — 99285 EMERGENCY DEPT VISIT HI MDM: CPT

## 2024-08-08 PROCEDURE — 74177 CT ABD & PELVIS W/CONTRAST: CPT

## 2024-08-08 PROCEDURE — 99222 1ST HOSP IP/OBS MODERATE 55: CPT | Performed by: NURSE PRACTITIONER

## 2024-08-08 PROCEDURE — 85610 PROTHROMBIN TIME: CPT

## 2024-08-08 PROCEDURE — 93325 DOPPLER ECHO COLOR FLOW MAPG: CPT

## 2024-08-08 PROCEDURE — 71275 CT ANGIOGRAPHY CHEST: CPT

## 2024-08-08 PROCEDURE — 80048 BASIC METABOLIC PNL TOTAL CA: CPT

## 2024-08-08 PROCEDURE — 85730 THROMBOPLASTIN TIME PARTIAL: CPT

## 2024-08-08 PROCEDURE — 93325 DOPPLER ECHO COLOR FLOW MAPG: CPT | Performed by: INTERNAL MEDICINE

## 2024-08-08 PROCEDURE — 93321 DOPPLER ECHO F-UP/LMTD STD: CPT

## 2024-08-08 RX ORDER — HEPARIN SODIUM 10000 [USP'U]/100ML
3-30 INJECTION, SOLUTION INTRAVENOUS
Status: DISCONTINUED | OUTPATIENT
Start: 2024-08-08 | End: 2024-08-09

## 2024-08-08 RX ORDER — ALBUTEROL SULFATE 90 UG/1
1 AEROSOL, METERED RESPIRATORY (INHALATION) EVERY 4 HOURS PRN
Status: DISCONTINUED | OUTPATIENT
Start: 2024-08-08 | End: 2024-08-09 | Stop reason: HOSPADM

## 2024-08-08 RX ORDER — DORZOLAMIDE HCL 20 MG/ML
1 SOLUTION/ DROPS OPHTHALMIC 3 TIMES DAILY
Status: DISCONTINUED | OUTPATIENT
Start: 2024-08-08 | End: 2024-08-09 | Stop reason: HOSPADM

## 2024-08-08 RX ORDER — CHLORAL HYDRATE 500 MG
2000 CAPSULE ORAL DAILY
Status: DISCONTINUED | OUTPATIENT
Start: 2024-08-08 | End: 2024-08-09 | Stop reason: HOSPADM

## 2024-08-08 RX ORDER — HEPARIN SODIUM 1000 [USP'U]/ML
5000 INJECTION, SOLUTION INTRAVENOUS; SUBCUTANEOUS EVERY 6 HOURS PRN
Status: DISCONTINUED | OUTPATIENT
Start: 2024-08-08 | End: 2024-08-09

## 2024-08-08 RX ORDER — KETOROLAC TROMETHAMINE 30 MG/ML
15 INJECTION, SOLUTION INTRAMUSCULAR; INTRAVENOUS ONCE
Status: COMPLETED | OUTPATIENT
Start: 2024-08-08 | End: 2024-08-08

## 2024-08-08 RX ORDER — LOSARTAN POTASSIUM 50 MG/1
100 TABLET ORAL DAILY
Status: DISCONTINUED | OUTPATIENT
Start: 2024-08-08 | End: 2024-08-08

## 2024-08-08 RX ORDER — FAMOTIDINE 20 MG/1
40 TABLET, FILM COATED ORAL EVERY MORNING
Status: DISCONTINUED | OUTPATIENT
Start: 2024-08-08 | End: 2024-08-08

## 2024-08-08 RX ORDER — DILTIAZEM HYDROCHLORIDE 180 MG/1
180 CAPSULE, COATED, EXTENDED RELEASE ORAL DAILY
Status: DISCONTINUED | OUTPATIENT
Start: 2024-08-08 | End: 2024-08-09 | Stop reason: HOSPADM

## 2024-08-08 RX ORDER — METOPROLOL SUCCINATE 50 MG/1
50 TABLET, EXTENDED RELEASE ORAL DAILY
Status: DISCONTINUED | OUTPATIENT
Start: 2024-08-08 | End: 2024-08-08

## 2024-08-08 RX ORDER — ZOLPIDEM TARTRATE 5 MG/1
5 TABLET ORAL
Status: DISCONTINUED | OUTPATIENT
Start: 2024-08-08 | End: 2024-08-09 | Stop reason: HOSPADM

## 2024-08-08 RX ORDER — HEPARIN SODIUM 1000 [USP'U]/ML
10000 INJECTION, SOLUTION INTRAVENOUS; SUBCUTANEOUS EVERY 6 HOURS PRN
Status: DISCONTINUED | OUTPATIENT
Start: 2024-08-08 | End: 2024-08-09

## 2024-08-08 RX ORDER — DEXTROAMPHETAMINE SACCHARATE, AMPHETAMINE ASPARTATE, DEXTROAMPHETAMINE SULFATE AND AMPHETAMINE SULFATE 2.5; 2.5; 2.5; 2.5 MG/1; MG/1; MG/1; MG/1
20 TABLET ORAL 2 TIMES DAILY
Status: DISCONTINUED | OUTPATIENT
Start: 2024-08-08 | End: 2024-08-09 | Stop reason: HOSPADM

## 2024-08-08 RX ORDER — PANTOPRAZOLE SODIUM 40 MG/1
40 TABLET, DELAYED RELEASE ORAL
Status: DISCONTINUED | OUTPATIENT
Start: 2024-08-09 | End: 2024-08-09 | Stop reason: HOSPADM

## 2024-08-08 RX ORDER — ACETAMINOPHEN 325 MG/1
975 TABLET ORAL EVERY 8 HOURS PRN
Status: DISCONTINUED | OUTPATIENT
Start: 2024-08-08 | End: 2024-08-09 | Stop reason: HOSPADM

## 2024-08-08 RX ORDER — PRAVASTATIN SODIUM 40 MG
80 TABLET ORAL
Status: DISCONTINUED | OUTPATIENT
Start: 2024-08-08 | End: 2024-08-09 | Stop reason: HOSPADM

## 2024-08-08 RX ORDER — LORATADINE 10 MG/1
10 TABLET ORAL DAILY
Status: DISCONTINUED | OUTPATIENT
Start: 2024-08-08 | End: 2024-08-09 | Stop reason: HOSPADM

## 2024-08-08 RX ORDER — HEPARIN SODIUM 1000 [USP'U]/ML
10000 INJECTION, SOLUTION INTRAVENOUS; SUBCUTANEOUS ONCE
Status: COMPLETED | OUTPATIENT
Start: 2024-08-08 | End: 2024-08-08

## 2024-08-08 RX ORDER — HYDROXYZINE HYDROCHLORIDE 25 MG/1
25 TABLET, FILM COATED ORAL 3 TIMES DAILY PRN
Status: DISCONTINUED | OUTPATIENT
Start: 2024-08-08 | End: 2024-08-09 | Stop reason: HOSPADM

## 2024-08-08 RX ORDER — FLUTICASONE PROPIONATE 50 MCG
2 SPRAY, SUSPENSION (ML) NASAL DAILY
Status: DISCONTINUED | OUTPATIENT
Start: 2024-08-08 | End: 2024-08-09 | Stop reason: HOSPADM

## 2024-08-08 RX ORDER — FAMOTIDINE 20 MG/1
40 TABLET, FILM COATED ORAL
Status: DISCONTINUED | OUTPATIENT
Start: 2024-08-08 | End: 2024-08-09 | Stop reason: HOSPADM

## 2024-08-08 RX ADMIN — HEPARIN SODIUM 18 UNITS/KG/HR: 10000 INJECTION, SOLUTION INTRAVENOUS at 12:52

## 2024-08-08 RX ADMIN — PRAVASTATIN SODIUM 80 MG: 40 TABLET ORAL at 16:57

## 2024-08-08 RX ADMIN — HEPARIN SODIUM 10000 UNITS: 1000 INJECTION INTRAVENOUS; SUBCUTANEOUS at 12:49

## 2024-08-08 RX ADMIN — ZOLPIDEM TARTRATE 5 MG: 5 TABLET ORAL at 22:17

## 2024-08-08 RX ADMIN — OMEGA-3 FATTY ACIDS CAP 1000 MG 2000 MG: 1000 CAP at 13:55

## 2024-08-08 RX ADMIN — IOHEXOL 100 ML: 350 INJECTION, SOLUTION INTRAVENOUS at 11:15

## 2024-08-08 RX ADMIN — DORZOLAMIDE HCL 1 DROP: 20 SOLUTION/ DROPS OPHTHALMIC at 17:01

## 2024-08-08 RX ADMIN — FAMOTIDINE 40 MG: 20 TABLET, FILM COATED ORAL at 22:17

## 2024-08-08 RX ADMIN — KETOROLAC TROMETHAMINE 15 MG: 30 INJECTION, SOLUTION INTRAMUSCULAR; INTRAVENOUS at 23:20

## 2024-08-08 RX ADMIN — DILTIAZEM HYDROCHLORIDE 180 MG: 180 CAPSULE, COATED, EXTENDED RELEASE ORAL at 13:55

## 2024-08-08 RX ADMIN — ACETAMINOPHEN 975 MG: 325 TABLET ORAL at 16:57

## 2024-08-08 RX ADMIN — DORZOLAMIDE HCL 1 DROP: 20 SOLUTION/ DROPS OPHTHALMIC at 20:42

## 2024-08-08 NOTE — PLAN OF CARE
Problem: PAIN - ADULT  Goal: Verbalizes/displays adequate comfort level or baseline comfort level  Description: Interventions:  - Encourage patient to monitor pain and request assistance  - Assess pain using appropriate pain scale  - Administer analgesics based on type and severity of pain and evaluate response  - Implement non-pharmacological measures as appropriate and evaluate response  - Consider cultural and social influences on pain and pain management  - Notify physician/advanced practitioner if interventions unsuccessful or patient reports new pain  Outcome: Progressing     Problem: INFECTION - ADULT  Goal: Absence or prevention of progression during hospitalization  Description: INTERVENTIONS:  - Assess and monitor for signs and symptoms of infection  - Monitor lab/diagnostic results  - Monitor all insertion sites, i.e. indwelling lines, tubes, and drains  - Monitor endotracheal if appropriate and nasal secretions for changes in amount and color  - [place] appropriate cooling/warming therapies per order  - Administer medications as ordered  - Instruct and encourage patient and family to use good hand hygiene technique  - Identify and instruct in appropriate isolation precautions for identified infection/condition  Outcome: Progressing  Goal: Absence of fever/infection during neutropenic period  Description: INTERVENTIONS:  - Monitor WBC    Outcome: Progressing     Problem: SAFETY ADULT  Goal: Patient will remain free of falls  Description: INTERVENTIONS:  - Educate patient/family on patient safety including physical limitations  - Instruct patient to call for assistance with activity   - Consult OT/PT to assist with strengthening/mobility   - Keep Call bell within reach  - Keep bed low and locked with side rails adjusted as appropriate  - Keep care items and personal belongings within reach  - Initiate and maintain comfort rounds  - Make Fall Risk Sign visible to staff  - Offer Toileting every 2 Hours,  in advance of need  - Initiate/Maintain n/a alarm  - Obtain necessary fall risk management equipment: nonslip shoes  - Apply yellow socks and bracelet for high fall risk patients  - Consider moving patient to room near nurses station  Outcome: Progressing  Goal: Maintain or return to baseline ADL function  Description: INTERVENTIONS:  -  Assess patient's ability to carry out ADLs; assess patient's baseline for ADL function and identify physical deficits which impact ability to perform ADLs (bathing, care of mouth/teeth, toileting, grooming, dressing, etc.)  - Assess/evaluate cause of self-care deficits   - Assess range of motion  - Assess patient's mobility; develop plan if impaired  - Assess patient's need for assistive devices and provide as appropriate  - Encourage maximum independence but intervene and supervise when necessary  - Involve family in performance of ADLs  - Assess for home care needs following discharge   - Consider OT consult to assist with ADL evaluation and planning for discharge  - Provide patient education as appropriate  Outcome: Progressing  Goal: Maintains/Returns to pre admission functional level  Description: INTERVENTIONS:  - Perform AM-PAC 6 Click Basic Mobility/ Daily Activity assessment daily.  - Set and communicate daily mobility goal to care team and patient/family/caregiver.   - Collaborate with rehabilitation services on mobility goals if consulted  - Perform Range of Motion 3 times a day.  - Reposition patient every 2 hours.  - Dangle patient 3 times a day  - Stand patient 3 times a day  - Ambulate patient 3 times a day  - Out of bed to chair 3 times a day   - Out of bed for meals 3 times a day  - Out of bed for toileting  - Record patient progress and toleration of activity level   Outcome: Progressing     Problem: DISCHARGE PLANNING  Goal: Discharge to home or other facility with appropriate resources  Description: INTERVENTIONS:  - Identify barriers to discharge w/patient and  caregiver  - Arrange for needed discharge resources and transportation as appropriate  - Identify discharge learning needs (meds, wound care, etc.)  - Arrange for interpretive services to assist at discharge as needed  - Refer to Case Management Department for coordinating discharge planning if the patient needs post-hospital services based on physician/advanced practitioner order or complex needs related to functional status, cognitive ability, or social support system  Outcome: Progressing     Problem: Knowledge Deficit  Goal: Patient/family/caregiver demonstrates understanding of disease process, treatment plan, medications, and discharge instructions  Description: Complete learning assessment and assess knowledge base.  Interventions:  - Provide teaching at level of understanding  - Provide teaching via preferred learning methods  Outcome: Progressing

## 2024-08-08 NOTE — ASSESSMENT & PLAN NOTE
Presented with PE found on CTA chest ordered by his PCP due to dyspnea on exertion, palpitations, uncontrolled BP and not feeling well  No prior history of personal or familial VTE   CTA chest- saddle bullous and extensive bilateral pulmonary arterial filling defects consistent with PE.  RV/LV ratio is WNL.   BNP 26. Hemodynamically stable. No chest pain or dyspnea.   Echocardiogram 4/17/624 with LVEF of 55%. Mild bi-atrium dilation.  Aortic root is moderately dilated. Mildly dilated ascending aorta.  Etiology is unclear. This appears to be unprovoked event.   Started on heparin infusion in ED, will continue.  Will likely transition to oral anticoagulation in the a.m.  Obtain an echocardiogram and venous duplex of lower extremities.   Will need to maintain on AC for at least 3-6 months

## 2024-08-08 NOTE — H&P
Critical access hospital  H&P  Name: Bro Roldan 48 y.o. male I MRN: [id number]  Unit/Bed#: -01 I Date of Admission: 8/8/2024   Date of Service: 8/8/2024 I Hospital Day: 0      Assessment & Plan   * PE (pulmonary thromboembolism) (HCC)  Assessment & Plan  Presented with PE found on CTA chest ordered by his PCP due to dyspnea on exertion, palpitations, uncontrolled BP and not feeling well  No prior history of personal or familial VTE   CTA chest- saddle bullous and extensive bilateral pulmonary arterial filling defects consistent with PE.  RV/LV ratio is WNL.   BNP 26. Hemodynamically stable. No chest pain or dyspnea.   Echocardiogram 4/17/624 with LVEF of 55%. Mild bi-atrium dilation.  Aortic root is moderately dilated. Mildly dilated ascending aorta.  Etiology is unclear. This appears to be unprovoked event.   Started on heparin infusion in ED, will continue.  Will likely transition to oral anticoagulation in the a.m.  Obtain an echocardiogram and venous duplex of lower extremities.   Will need to maintain on AC for at least 3-6 months     JORGE (obstructive sleep apnea)  Assessment & Plan  In the process of getting a CPAP outpatient     Attention deficit hyperactivity disorder (ADHD), predominantly inattentive type  Assessment & Plan  Continue with adderall     Hypercholesterolemia  Assessment & Plan  Continue with statin     Essential hypertension  Assessment & Plan  Not well-controlled   BP noted to be in 150s/990-100s at home   Reported that he was on toprol and valsartan in the past   Has been following with his PCP to manage this last seen 8/7  Started on cardizem single agent outpatient. Avoiding BB in the setting of asthma. Will continue.   Monitor BP closely and adjust medication as indicated     Asthma, currently inactive  Assessment & Plan  In no acute exacerbation  Continue with albuterol as needed         VTE Pharmacologic Prophylaxis: VTE Score: 5 High Risk (Score >/= 5) -  Pharmacological DVT Prophylaxis Ordered: heparin drip. Sequential Compression Devices Ordered.  Code Status: Level 1 - Full Code discussed with patient     Anticipated Length of Stay: Patient will be admitted on an observation basis with an anticipated length of stay of less than 2 midnights secondary to PE.    Total Time Spent on Date of Encounter in care of patient: 50 mins. This time was spent on one or more of the following: performing physical exam; counseling and coordination of care; obtaining or reviewing history; documenting in the medical record; reviewing/ordering tests, medications or procedures; communicating with other healthcare professionals and discussing with patient's family/caregivers.    Chief Complaint: abnormal CTA chest-pulmonary embolism    History of Present Illness:  Bro Roldan is a 48 y.o. male with a PMH of uncontrolled hypertension, anxiety, and asthma who presents with a finding of pulmonary embolism on CTA chest ordered PCP.  The patient states that he has been feeling unwell with increasing dyspnea walking up the steps, and elevated blood pressure.  He was seen by his PCP on 8/7 and blood work was obtained as well as CTA of the chest abdomen/pelvis which found a saddle PE.  The patient subsequently was directed to come into the hospital for further evaluation and treatment.  The patient denies any dyspnea, chest pain, lightheadedness, dizziness, nausea, vomiting, or abdominal pain.  Reports some chest tenderness upon palpation on the right side of the chest.  No worsening pain with aching deep breaths or exertion.  He is in the process of getting his blood pressure better control.  He reports that his readings at home were 150s/90s to 100s.  He was previously on valsartan and beta-blocker without much improvement of his blood pressure.  His PCP recently started him on Cardizem.  In the ED, the patient was started on heparin infusion.    Review of Systems:  Review of Systems    Constitutional:  Negative for chills, fatigue and fever.        Feeling unwell overall      HENT:  Negative for congestion, ear pain, postnasal drip and sore throat.    Eyes:  Negative for discharge, itching and visual disturbance.   Respiratory:  Positive for shortness of breath (with exertion). Negative for cough and chest tightness.    Cardiovascular:  Negative for chest pain, palpitations and leg swelling.        Right-sided chest tenderness on palpation      Gastrointestinal:  Negative for abdominal pain, nausea and vomiting.   Endocrine: Negative for cold intolerance and heat intolerance.   Genitourinary:  Negative for difficulty urinating, dysuria and hematuria.   Musculoskeletal:  Negative for back pain, gait problem and neck pain.   Skin:  Negative for rash and wound.   Neurological:  Negative for dizziness, speech difficulty, weakness, light-headedness and headaches.   Psychiatric/Behavioral:  Negative for confusion, decreased concentration and dysphoric mood.        Past Medical and Surgical History:   Past Medical History:   Diagnosis Date    Allergic     Anxiety     Arthritis     Asthma     Depression     GERD (gastroesophageal reflux disease)     Hypertension     Inflammatory bowel disease     Obesity     Visual impairment        Past Surgical History:   Procedure Laterality Date    EYE SURGERY      2021/2022    KNEE SURGERY  2006       Meds/Allergies:  Prior to Admission medications    Medication Sig Start Date End Date Taking? Authorizing Provider   amphetamine-dextroamphetamine (ADDERALL) 20 mg tablet take 1 tablet by mouth twice a day  Patient taking differently: Take 10 mg by mouth 3 (three) times a day 5/30/24  Yes Roberto Blackman, DO   cetirizine (ZyrTEC) 10 mg tablet Take 10 mg by mouth every morning 3/21/24  Yes Historical Provider, MD   diltiazem (CARDIZEM CD) 180 mg 24 hr capsule Take 1 capsule (180 mg total) by mouth daily 8/7/24  Yes Abdiel Acharya, DO   dorzolamide (TRUSOPT) 2 % ophthalmic  solution Administer 1 drop into the left eye 2 (two) times a day 4/24/24  Yes Historical Provider, MD   famotidine (PEPCID) 40 MG tablet Take 1 tablet (40 mg total) by mouth every morning  Patient taking differently: Take 40 mg by mouth every evening 4/1/24 3/27/25 Yes Roberto Blackman DO   fluticasone (FLONASE) 50 mcg/act nasal spray instill 2 sprays into each nostril once daily 6/2/24  Yes Roberto Blackman DO   metoprolol succinate (TOPROL-XL) 50 mg 24 hr tablet Take 50 mg by mouth daily   Yes Historical Provider, MD   Omega-3 Fatty Acids (fish oil) 1,000 mg Take 2 capsules (2,000 mg total) by mouth daily 4/1/24 3/27/25 Yes Roberto Blackman DO   omeprazole (PriLOSEC) 40 MG capsule Take 1 capsule (40 mg total) by mouth daily  Patient taking differently: Take 40 mg by mouth every evening 4/1/24 3/27/25 Yes Roberto Blackman DO   Rosuvastatin Calcium 20 MG CPSP Take 20 mg by mouth in the morning 4/1/24 3/27/25 Yes Roberto Blackman DO   Semaglutide-Weight Management (WEGOVY) 0.25 MG/0.5ML Inject 0.5 mL (0.25 mg total) under the skin once a week for 4 doses 7/30/24 8/21/24 Yes Chasidy Garza PA-C   valsartan (DIOVAN) 160 mg tablet Take 1 tablet (160 mg total) by mouth daily 4/1/24  Yes Roberto Blackman DO   albuterol (ProAir HFA) 90 mcg/act inhaler Inhale 1 puff every 4 (four) hours as needed for wheezing or shortness of breath 4/5/24 3/31/25  Roberto Blackman DO   Dextroamphetamine Sulfate 20 MG TABS Take 1 tablet (20 mg total) by mouth in the morning Max Daily Amount: 20 mg  Patient not taking: Reported on 8/7/2024 4/1/24 5/1/24  Roberto Blackman DO   hydrOXYzine HCL (ATARAX) 25 mg tablet Take 1 tablet (25 mg total) by mouth 3 (three) times a day as needed for anxiety 4/1/24 3/27/25  Roberto Blackman DO   methylPREDNISolone 4 MG tablet therapy pack Use as directed on package  Patient not taking: Reported on 4/26/2024 4/1/24   Roebrto Blackman DO   naproxen (NAPROSYN) 500 mg tablet Take 1 tablet (500 mg total) by mouth 2 (two) times a  "day as needed for moderate pain 4/1/24 8/7/24  Roberto Blackman DO   oxazepam (SERAX) 15 mg capsule Take 15 mg by mouth daily as needed for anxiety  Patient not taking: Reported on 7/30/2024    Historical Provider, MD   predniSONE 20 mg tablet 3 Tabs PO QDay x 3 Days, Then 2 Tabs PO QDay x 3 Days, Then 1 Tab PO QDay x 3 Days, Then 1/2 Tab PO QDay x 4 Days, Then Stop 4/26/24   Roberto Blackman DO   zolpidem (AMBIEN) 5 mg tablet Take 1 tablet (5 mg total) by mouth daily at bedtime as needed for sleep 4/26/24   Roberto Blackman DO     I have reviewed home medications with patient personally.    Allergies:   Allergies   Allergen Reactions    Cat Hair Extract     Pollen Extract        Social History:  Marital Status: Single   Occupation: n/a   Patient Pre-hospital Living Situation: Home  Patient Pre-hospital Level of Mobility: walks  Patient Pre-hospital Diet Restrictions: none   Substance Use History:   Social History     Substance and Sexual Activity   Alcohol Use Not Currently    Comment: occasional     Social History     Tobacco Use   Smoking Status Former    Passive exposure: Never   Smokeless Tobacco Never     Social History     Substance and Sexual Activity   Drug Use Yes    Types: Marijuana    Comment: Medical Marijuana       Family History:  Family History   Problem Relation Age of Onset    Diabetes Maternal Grandmother     Diabetes Maternal Grandfather     Cancer Paternal Grandfather        Physical Exam:     Vitals:   Blood Pressure: 144/97 (08/08/24 1515)  Pulse: 67 (08/08/24 1500)  Temperature: (!) 97.4 °F (36.3 °C) (08/08/24 1515)  Temp Source: Oral (08/08/24 1515)  Respirations: 16 (08/08/24 1515)  Height: 6' 4\" (193 cm) (08/08/24 1500)  Weight - Scale: 133 kg (294 lb) (08/08/24 1500)  SpO2: 95 % (08/08/24 1321)    Physical Exam  Vitals and nursing note reviewed.   Constitutional:       General: He is not in acute distress.     Appearance: Normal appearance.   HENT:      Head: Normocephalic and atraumatic.      " Right Ear: External ear normal.      Left Ear: External ear normal.      Nose: Nose normal.      Mouth/Throat:      Mouth: Mucous membranes are moist.      Pharynx: Oropharynx is clear.   Eyes:      General:         Right eye: No discharge.         Left eye: No discharge.      Extraocular Movements: Extraocular movements intact.      Pupils: Pupils are equal, round, and reactive to light.   Cardiovascular:      Rate and Rhythm: Normal rate and regular rhythm.      Pulses: Normal pulses.      Heart sounds: Normal heart sounds. No murmur heard.  Pulmonary:      Effort: Pulmonary effort is normal. No respiratory distress.      Breath sounds: Normal breath sounds. No wheezing or rales.   Chest:      Chest wall: Tenderness (right-sided on palpation) present.   Abdominal:      General: Bowel sounds are normal. There is no distension.      Palpations: Abdomen is soft. There is no mass.      Tenderness: There is no abdominal tenderness.   Musculoskeletal:         General: No swelling, tenderness or deformity. Normal range of motion.      Cervical back: Normal range of motion and neck supple. No rigidity.   Skin:     General: Skin is warm and dry.      Capillary Refill: Capillary refill takes less than 2 seconds.      Coloration: Skin is not pale.      Findings: No erythema.   Neurological:      General: No focal deficit present.      Mental Status: He is alert and oriented to person, place, and time. Mental status is at baseline.   Psychiatric:         Mood and Affect: Mood normal.         Behavior: Behavior normal.         Thought Content: Thought content normal.         Judgment: Judgment normal.        Additional Data:     Lab Results:  Results from last 7 days   Lab Units 08/08/24  1200   WBC Thousand/uL 9.27   HEMOGLOBIN g/dL 16.4   HEMATOCRIT % 49.9*   PLATELETS Thousands/uL 214   SEGS PCT % 52   LYMPHO PCT % 37   MONO PCT % 9   EOS PCT % 1     Results from last 7 days   Lab Units 08/08/24  1200 08/07/24  1416    SODIUM mmol/L 135 139   POTASSIUM mmol/L 4.6 4.7   CHLORIDE mmol/L 101 103   CO2 mmol/L 27 29   BUN mg/dL 14 18   CREATININE mg/dL 0.93 0.84   ANION GAP mmol/L 7 7   CALCIUM mg/dL 9.8 9.8   ALBUMIN g/dL  --  4.2   TOTAL BILIRUBIN mg/dL  --  0.74   ALK PHOS U/L  --  39   ALT U/L  --  21   AST U/L  --  17   GLUCOSE RANDOM mg/dL 87 88     Results from last 7 days   Lab Units 08/08/24  1200   INR  0.95         Lab Results   Component Value Date    HGBA1C 5.5 02/14/2023           Lines/Drains:  Invasive Devices       Peripheral Intravenous Line  Duration             Peripheral IV 08/08/24 Right Antecubital <1 day                        Imaging: Reviewed radiology reports from this admission including: chest CT scan, abdominal/pelvic CT, and ultrasound(s)   VAS VENOUS DUPLEX - LOWER LIMB BILATERAL    (Results Pending)       EKG and Other Studies Reviewed on Admission:   EKG: No EKG obtained.    ** Please Note: This note has been constructed using a voice recognition system. **

## 2024-08-08 NOTE — ED PROVIDER NOTES
"History  Chief Complaint   Patient presents with    Evaluation of Abnormal Diagnostic Test     +CT showing + PE     Patient is a 48-year-old male with a past medical history including hypertension, GERD, anxiety, and depression.  Presents today for abnormal CT result. States that he began to feel unwell last week and was seen in PCP 2 days ago. Reports feeling NEELY, especially when walking up steps, and lightheadedness resolved when sitting down. Obtained blood work yesterday which showed d-dimer of 8 and was advised to have a CTA of chest done. CTA done today showing a \"saddle embolus and extensive bilateral pulmonary arterial filling defects consistent with PE.\" Patient notified of test result and urged to come to ED. Denies any recent illness, prolonged periods of sitting, or any recent travel.         Evaluation of Abnormal Diagnostic Test      Prior to Admission Medications   Prescriptions Last Dose Informant Patient Reported? Taking?   Dextroamphetamine Sulfate 20 MG TABS   No No   Sig: Take 1 tablet (20 mg total) by mouth in the morning Max Daily Amount: 20 mg   Patient not taking: Reported on 8/7/2024   Omega-3 Fatty Acids (fish oil) 1,000 mg 8/8/2024  No Yes   Sig: Take 2 capsules (2,000 mg total) by mouth daily   Rosuvastatin Calcium 20 MG CPSP 8/8/2024  No Yes   Sig: Take 20 mg by mouth in the morning   Semaglutide-Weight Management (WEGOVY) 0.25 MG/0.5ML Past Week  No Yes   Sig: Inject 0.5 mL (0.25 mg total) under the skin once a week for 4 doses   albuterol (ProAir HFA) 90 mcg/act inhaler More than a month  No No   Sig: Inhale 1 puff every 4 (four) hours as needed for wheezing or shortness of breath   amphetamine-dextroamphetamine (ADDERALL) 20 mg tablet 8/8/2024  No Yes   Sig: take 1 tablet by mouth twice a day   Patient taking differently: Take 10 mg by mouth 3 (three) times a day   cetirizine (ZyrTEC) 10 mg tablet 8/8/2024  Yes Yes   Sig: Take 10 mg by mouth every morning   diltiazem (CARDIZEM CD) 180 " mg 24 hr capsule 8/8/2024  No Yes   Sig: Take 1 capsule (180 mg total) by mouth daily   dorzolamide (TRUSOPT) 2 % ophthalmic solution 8/8/2024  Yes Yes   Sig: Administer 1 drop into the left eye 2 (two) times a day   famotidine (PEPCID) 40 MG tablet 8/7/2024  No Yes   Sig: Take 1 tablet (40 mg total) by mouth every morning   Patient taking differently: Take 40 mg by mouth every evening   fluticasone (FLONASE) 50 mcg/act nasal spray 8/8/2024  No Yes   Sig: instill 2 sprays into each nostril once daily   hydrOXYzine HCL (ATARAX) 25 mg tablet More than a month  No No   Sig: Take 1 tablet (25 mg total) by mouth 3 (three) times a day as needed for anxiety   methylPREDNISolone 4 MG tablet therapy pack   No No   Sig: Use as directed on package   Patient not taking: Reported on 4/26/2024   metoprolol succinate (TOPROL-XL) 50 mg 24 hr tablet 8/8/2024  Yes Yes   Sig: Take 50 mg by mouth daily   naproxen (NAPROSYN) 500 mg tablet   No No   Sig: Take 1 tablet (500 mg total) by mouth 2 (two) times a day as needed for moderate pain   omeprazole (PriLOSEC) 40 MG capsule 8/7/2024  No Yes   Sig: Take 1 capsule (40 mg total) by mouth daily   Patient taking differently: Take 40 mg by mouth every evening   oxazepam (SERAX) 15 mg capsule Not Taking  Yes No   Sig: Take 15 mg by mouth daily as needed for anxiety   Patient not taking: Reported on 7/30/2024   predniSONE 20 mg tablet   No No   Sig: 3 Tabs PO QDay x 3 Days, Then 2 Tabs PO QDay x 3 Days, Then 1 Tab PO QDay x 3 Days, Then 1/2 Tab PO QDay x 4 Days, Then Stop   valsartan (DIOVAN) 160 mg tablet 8/8/2024  No Yes   Sig: Take 1 tablet (160 mg total) by mouth daily   zolpidem (AMBIEN) 5 mg tablet More than a month  No No   Sig: Take 1 tablet (5 mg total) by mouth daily at bedtime as needed for sleep      Facility-Administered Medications: None       Past Medical History:   Diagnosis Date    Allergic     Anxiety     Arthritis     Asthma     Depression     GERD (gastroesophageal reflux  disease)     Hypertension     Inflammatory bowel disease     Obesity     Visual impairment        Past Surgical History:   Procedure Laterality Date    EYE SURGERY      2021/2022    KNEE SURGERY  2006       Family History   Problem Relation Age of Onset    Diabetes Maternal Grandmother     Diabetes Maternal Grandfather     Cancer Paternal Grandfather      I have reviewed and agree with the history as documented.    E-Cigarette/Vaping    E-Cigarette Use Never User      E-Cigarette/Vaping Substances    Nicotine No     THC No     CBD No     Flavoring No     Other No     Unknown No      Social History     Tobacco Use    Smoking status: Former     Passive exposure: Never    Smokeless tobacco: Never   Vaping Use    Vaping status: Never Used   Substance Use Topics    Alcohol use: Not Currently     Comment: occasional    Drug use: Yes     Types: Marijuana     Comment: Medical Marijuana       Review of Systems   Constitutional:  Negative for chills and fever.   Respiratory:  Positive for shortness of breath.    Cardiovascular:  Negative for chest pain.   Gastrointestinal:  Negative for abdominal pain, diarrhea, nausea and vomiting.   Neurological:  Negative for light-headedness.   All other systems reviewed and are negative.      Physical Exam  Physical Exam  Vitals and nursing note reviewed.   Constitutional:       Appearance: Normal appearance.   HENT:      Head: Normocephalic and atraumatic.   Cardiovascular:      Rate and Rhythm: Regular rhythm.      Heart sounds: Normal heart sounds.   Pulmonary:      Effort: Pulmonary effort is normal.      Breath sounds: Normal breath sounds.   Abdominal:      General: Abdomen is flat. Bowel sounds are normal.      Palpations: Abdomen is soft.      Tenderness: There is no abdominal tenderness.   Musculoskeletal:         General: Normal range of motion.   Skin:     General: Skin is warm and dry.      Capillary Refill: Capillary refill takes less than 2 seconds.   Neurological:       General: No focal deficit present.      Mental Status: He is alert and oriented to person, place, and time.   Psychiatric:         Mood and Affect: Mood normal.         Behavior: Behavior normal.         Vital Signs  ED Triage Vitals   Temperature Pulse Respirations Blood Pressure SpO2   08/08/24 1150 08/08/24 1150 08/08/24 1150 08/08/24 1150 08/08/24 1150   98 °F (36.7 °C) 66 18 144/98 98 %      Temp Source Heart Rate Source Patient Position - Orthostatic VS BP Location FiO2 (%)   08/08/24 1321 08/08/24 1150 08/08/24 1150 08/08/24 1150 --   Oral Monitor Sitting Right arm       Pain Score       08/08/24 1150       No Pain           Vitals:    08/08/24 1150 08/08/24 1321 08/08/24 1500 08/08/24 1515   BP: 144/98 148/95 148/95 144/97   Pulse: 66 93 67    Patient Position - Orthostatic VS: Sitting Sitting  Lying         Visual Acuity      ED Medications  Medications   heparin (porcine) 25,000 units in 0.45% NaCl 250 mL infusion (premix) (18 Units/kg/hr × 125 kg (Order-Specific) Intravenous New Bag 8/8/24 1252)   heparin (porcine) injection 10,000 Units (has no administration in time range)   heparin (porcine) injection 5,000 Units (has no administration in time range)   albuterol (PROVENTIL HFA,VENTOLIN HFA) inhaler 1 puff (has no administration in time range)   amphetamine-dextroamphetamine (ADDERALL) tablet 20 mg (20 mg Oral Not Given 8/8/24 1714)   loratadine (CLARITIN) tablet 10 mg (10 mg Oral Not Given 8/8/24 1345)   diltiazem (CARDIZEM CD) 24 hr capsule 180 mg (180 mg Oral Given 8/8/24 1355)   dorzolamide (TRUSOPT) ophthalmic solution 1 drop (1 drop Both Eyes Given 8/8/24 1701)   fluticasone (FLONASE) 50 mcg/act nasal spray 2 spray (2 sprays Each Nare Not Given 8/8/24 1345)   hydrOXYzine HCL (ATARAX) tablet 25 mg (has no administration in time range)   fish oil capsule 2,000 mg (2,000 mg Oral Given 8/8/24 3671)   pantoprazole (PROTONIX) EC tablet 40 mg (has no administration in time range)   pravastatin  (PRAVACHOL) tablet 80 mg (80 mg Oral Given 8/8/24 1657)   zolpidem (AMBIEN) tablet 5 mg (has no administration in time range)   acetaminophen (TYLENOL) tablet 975 mg (975 mg Oral Given 8/8/24 1657)   famotidine (PEPCID) tablet 40 mg (has no administration in time range)   heparin (porcine) injection 10,000 Units (10,000 Units Intravenous Given 8/8/24 1249)       Diagnostic Studies  Results Reviewed       Procedure Component Value Units Date/Time    APTT [[id number]]     Lab Status: No result Specimen: Blood     Basic metabolic panel [[id number]] Collected: 08/08/24 1200    Lab Status: Final result Specimen: Blood from Arm, Right Updated: 08/08/24 1223     Sodium 135 mmol/L      Potassium 4.6 mmol/L      Chloride 101 mmol/L      CO2 27 mmol/L      ANION GAP 7 mmol/L      BUN 14 mg/dL      Creatinine 0.93 mg/dL      Glucose 87 mg/dL      Calcium 9.8 mg/dL      eGFR 96 ml/min/1.73sq m     Narrative:      National Kid[employer] Foundation guidelines for Chronic Kidney Disease (CKD):     Stage 1 with normal or high GFR (GFR > 90 mL/min/1.73 square meters)    Stage 2 Mild CKD (GFR = 60-89 mL/min/1.73 square meters)    Stage 3A Moderate CKD (GFR = 45-59 mL/min/1.73 square meters)    Stage 3B Moderate CKD (GFR = 30-44 mL/min/1.73 square meters)    Stage 4 Severe CKD (GFR = 15-29 mL/min/1.73 square meters)    Stage 5 End Stage CKD (GFR <15 mL/min/1.73 square meters)  Note: GFR calculation is accurate only with a steady state creatinine    Protime-INR [[id number]]  (Normal) Collected: 08/08/24 1200    Lab Status: Final result Specimen: Blood from Arm, Right Updated: 08/08/24 1217     Protime 13.2 seconds      INR 0.95    Narrative:      INR Therapeutic Range    Indication                                             INR Range      Atrial Fibrillation                                               2.0-3.0  Hypercoagulable State                                    2.0.2.3  Left Ventricular Asist Device                             2.0-3.0  Mechanical Heart Valve                                  -    Aortic(with afib, MI, embolism, HF, LA enlargement,    and/or coagulopathy)                                     2.0-3.0 (2.5-3.5)     Mitral                                                             2.5-3.5  Prosthetic/Bioprosthetic Heart Valve               2.0-3.0  Venous thromboembolism (VTE: VT, PE        2.0-3.0    APTT [[id number]]  (Normal) Collected: 08/08/24 1200    Lab Status: Final result Specimen: Blood from Arm, Right Updated: 08/08/24 1217     PTT 27 seconds     CBC and differential [[id number]]  (Abnormal) Collected: 08/08/24 1200    Lab Status: Final result Specimen: Blood from Arm, Right Updated: 08/08/24 1204     WBC 9.27 Thousand/uL      RBC 5.24 Million/uL      Hemoglobin 16.4 g/dL      Hematocrit 49.9 %      MCV 95 fL      MCH 31.3 pg      MCHC 32.9 g/dL      RDW 12.8 %      MPV 9.5 fL      Platelets 214 Thousands/uL      nRBC 0 /100 WBCs      Segmented % 52 %      Immature Grans % 0 %      Lymphocytes % 37 %      Monocytes % 9 %      Eosinophils Relative 1 %      Basophils Relative 1 %      Absolute Neutrophils 4.88 Thousands/µL      Absolute Immature Grans 0.02 Thousand/uL      Absolute Lymphocytes 3.41 Thousands/µL      Absolute Monocytes 0.80 Thousand/µL      Eosinophils Absolute 0.11 Thousand/µL      Basophils Absolute 0.05 Thousands/µL                     VAS VENOUS DUPLEX - LOWER LIMB BILATERAL    (Results Pending)              Procedures  ECG 12 Lead Documentation Only    Date/Time: 8/8/2024 12:30 PM    Performed by: LIANNA Gandhi  Authorized by: LIANNA Gandhi    Indications / Diagnosis:  SOB/saddle PE  Patient location:  ED  Previous ECG:     Previous ECG:  Compared to current    Comparison ECG info:  NSR, heart rate=70    Similarity:  No change  Interpretation:     Interpretation: normal    Rate:     ECG rate:  73    ECG rate assessment: normal    Rhythm:     Rhythm: sinus rhythm    Ectopy:      Ectopy: none    QRS:     QRS axis:  Normal  Conduction:     Conduction: normal    ST segments:     ST segments:  Normal  T waves:     T waves: normal             ED Course                                 SBIRT 20yo+      Flowsheet Row Most Recent Value   Initial Alcohol Screen: US AUDIT-C     1. How often do you have a drink containing alcohol? 0 Filed at: 08/08/2024 1149   2. How many drinks containing alcohol do you have on a typical day you are drinking?  0 Filed at: 08/08/2024 1149   3a. Male UNDER 65: How often do you have five or more drinks on one occasion? 0 Filed at: 08/08/2024 1149   3b. FEMALE Any Age, or MALE 65+: How often do you have 4 or more drinks on one occassion? 0 Filed at: 08/08/2024 1149   Audit-C Score 0 Filed at: 08/08/2024 1141   CHRISS: How many times in the past year have you...    Used an illegal drug or used a prescription medication for non-medical reasons? Never Filed at: 08/08/2024 1143                      Medical Decision Making  Patient is a 48-year-old male presenting for saddle PE found on outpatient CT today. Discussed with patient will obtain basic lab work, EKG, and will begin anticoagulation with heparin. EKG showing normal sinus rhythm with a heart rate of 73. No previous EKG available for comparison. Discussed with patient risks and benefits of anticoagulation and patient verbalizes understanding. Heparin started. Reached out to [facility] for admission.     Amount and/or Complexity of Data Reviewed  Labs: ordered.     Details: Reviewed     Risk  Prescription drug management.  Decision regarding hospitalization.                 Disposition  Final diagnoses:   Saddle pulmonary embolus (HCC)   Dyspnea on exertion     Time reflects when diagnosis was documented in both MDM as applicable and the Disposition within this note       Time User Action Codes Description Comment    8/8/2024  1:27 PM Faith Whatley Add [I26.99] PE (pulmonary thromboembolism) (HCC)     8/8/2024  4:30 PM  Toneymarvel Christal Add [I26.92] Saddle pulmonary embolus (HCC)     8/8/2024  4:31 PM hCristal Hilario Add [R06.09] Dyspnea on exertion           ED Disposition       ED Disposition   Admit    Condition   Stable    Date/Time   Thu Aug 8, 2024 1238    Comment   Case was discussed with [facility] and the patient's admission status was agreed to be Admission Status: observation status to the service of Dr. Carolina .               Follow-up Information    None         Current Discharge Medication List        CONTINUE these medications which have NOT CHANGED    Details   amphetamine-dextroamphetamine (ADDERALL) 20 mg tablet take 1 tablet by mouth twice a day  Qty: 30 tablet, Refills: 0    Associated Diagnoses: Attention deficit hyperactivity disorder (ADHD), predominantly inattentive type      cetirizine (ZyrTEC) 10 mg tablet Take 10 mg by mouth every morning      diltiazem (CARDIZEM CD) 180 mg 24 hr capsule Take 1 capsule (180 mg total) by mouth daily  Qty: 100 capsule, Refills: 3    Associated Diagnoses: Essential hypertension; Biatrial enlargement; LVH (left ventricular hypertrophy); Aortic root dilatation (HCC); Palpitations; NEELY (dyspnea on exertion); Other fatigue; JORGE (obstructive sleep apnea)      dorzolamide (TRUSOPT) 2 % ophthalmic solution Administer 1 drop into the left eye 2 (two) times a day      famotidine (PEPCID) 40 MG tablet Take 1 tablet (40 mg total) by mouth every morning  Qty: 90 tablet, Refills: 3    Associated Diagnoses: Gastroesophageal reflux disease without esophagitis      fluticasone (FLONASE) 50 mcg/act nasal spray instill 2 sprays into each nostril once daily  Qty: 16 g, Refills: 5    Associated Diagnoses: Allergic rhinitis, unspecified seasonality, unspecified trigger      metoprolol succinate (TOPROL-XL) 50 mg 24 hr tablet Take 50 mg by mouth daily      Omega-3 Fatty Acids (fish oil) 1,000 mg Take 2 capsules (2,000 mg total) by mouth daily  Qty: 60 capsule, Refills: 11    Associated Diagnoses:  Hypercholesterolemia      omeprazole (PriLOSEC) 40 MG capsule Take 1 capsule (40 mg total) by mouth daily  Qty: 90 capsule, Refills: 3    Associated Diagnoses: Gastroesophageal reflux disease without esophagitis      Rosuvastatin Calcium 20 MG CPSP Take 20 mg by mouth in the morning  Qty: 90 capsule, Refills: 30    Associated Diagnoses: Hypercholesterolemia      Semaglutide-Weight Management (WEGOVY) 0.25 MG/0.5ML Inject 0.5 mL (0.25 mg total) under the skin once a week for 4 doses  Qty: 2 mL, Refills: 0    Associated Diagnoses: Severe obesity (HCC)      valsartan (DIOVAN) 160 mg tablet Take 1 tablet (160 mg total) by mouth daily  Qty: 90 tablet, Refills: 3    Associated Diagnoses: Essential hypertension      albuterol (ProAir HFA) 90 mcg/act inhaler Inhale 1 puff every 4 (four) hours as needed for wheezing or shortness of breath  Qty: 20.1 g, Refills: 3    Comments: Substitution to a formulary equivalent within the same pharmaceutical class is authorized.  Associated Diagnoses: Mild intermittent asthmatic bronchitis with acute exacerbation      Dextroamphetamine Sulfate 20 MG TABS Take 1 tablet (20 mg total) by mouth in the morning Max Daily Amount: 20 mg  Qty: 30 tablet, Refills: 0    Associated Diagnoses: Attention deficit hyperactivity disorder (ADHD), predominantly inattentive type      hydrOXYzine HCL (ATARAX) 25 mg tablet Take 1 tablet (25 mg total) by mouth 3 (three) times a day as needed for anxiety  Qty: 90 tablet, Refills: 3    Associated Diagnoses: Anxiety      methylPREDNISolone 4 MG tablet therapy pack Use as directed on package  Qty: 21 each, Refills: 0    Associated Diagnoses: Arthralgia of both knees      naproxen (NAPROSYN) 500 mg tablet Take 1 tablet (500 mg total) by mouth 2 (two) times a day as needed for moderate pain  Qty: 180 tablet, Refills: 0    Associated Diagnoses: Lumbar spine pain      oxazepam (SERAX) 15 mg capsule Take 15 mg by mouth daily as needed for anxiety      predniSONE 20 mg  tablet 3 Tabs PO QDay x 3 Days, Then 2 Tabs PO QDay x 3 Days, Then 1 Tab PO QDay x 3 Days, Then 1/2 Tab PO QDay x 4 Days, Then Stop  Qty: 20 tablet, Refills: 0    Associated Diagnoses: Irritant contact dermatitis due to plants, except food      zolpidem (AMBIEN) 5 mg tablet Take 1 tablet (5 mg total) by mouth daily at bedtime as needed for sleep  Qty: 30 tablet, Refills: 0    Associated Diagnoses: Insomnia, unspecified type             No discharge procedures on file.    PDMP Review         Value Time User    PDMP Reviewed  Yes 5/30/2024  9:27 AM Roberto TILLMAN&#39;DO Devyn            ED Provider  Electronically Signed by             LIANNA Gandhi  08/08/24 4330       LIANNA Gandhi  08/08/24 5136

## 2024-08-08 NOTE — ASSESSMENT & PLAN NOTE
Not well-controlled   BP noted to be in 150s/990-100s at home   Reported that he was on toprol and valsartan in the past   Has been following with his PCP to manage this last seen 8/7  Started on cardizem single agent outpatient. Avoiding BB in the setting of asthma. Will continue.   Monitor BP closely and adjust medication as indicated

## 2024-08-08 NOTE — TELEPHONE ENCOUNTER
----- Message from Abdiel Acharya DO sent at 8/8/2024 11:32 AM EDT -----  Radiology called and pt with PE of the lung. Needs to go to the ER immediately. Need to call pt and tell him. I can talk if he wants once you have him if he wants. If not, LMK which ER so I can triage him.

## 2024-08-08 NOTE — TELEPHONE ENCOUNTER
----- Message from Abdiel Acharya DO sent at 8/8/2024  6:47 AM EDT -----  Call pt, labs ok except test for blood clots is high. Stat CT lung ordered. If pt wants to discuss, can call after meeting, but needs to get the test done.

## 2024-08-08 NOTE — TELEPHONE ENCOUNTER
Adriana from [employer] needs the signed script for the cpap bundle for the patient faxed to them.  They cannot process the order without it. The fax# is [phone]

## 2024-08-09 VITALS
SYSTOLIC BLOOD PRESSURE: 131 MMHG | TEMPERATURE: 98.4 F | DIASTOLIC BLOOD PRESSURE: 84 MMHG | OXYGEN SATURATION: 92 % | HEIGHT: 76 IN | WEIGHT: 294 LBS | BODY MASS INDEX: 35.8 KG/M2 | RESPIRATION RATE: 17 BRPM | HEART RATE: 61 BPM

## 2024-08-09 LAB
APTT PPP: 85 SECONDS (ref 23–34)
APTT PPP: 92 SECONDS (ref 23–34)
ATRIAL RATE: 72 BPM
ATRIAL RATE: 73 BPM
ATRIAL RATE: 74 BPM
ERYTHROCYTE [DISTWIDTH] IN BLOOD BY AUTOMATED COUNT: 13 % (ref 11.6–15.1)
HCT VFR BLD AUTO: 47.5 % (ref 36.5–49.3)
HGB BLD-MCNC: 15.5 G/DL (ref 12–17)
MCH RBC QN AUTO: 31.1 PG (ref 26.8–34.3)
MCHC RBC AUTO-ENTMCNC: 32.6 G/DL (ref 31.4–37.4)
MCV RBC AUTO: 95 FL (ref 82–98)
P AXIS: 33 DEGREES
P AXIS: 36 DEGREES
P AXIS: 47 DEGREES
PLATELET # BLD AUTO: 197 THOUSANDS/UL (ref 149–390)
PMV BLD AUTO: 9.4 FL (ref 8.9–12.7)
PR INTERVAL: 178 MS
PR INTERVAL: 192 MS
PR INTERVAL: 194 MS
QRS AXIS: 34 DEGREES
QRS AXIS: 38 DEGREES
QRS AXIS: 56 DEGREES
QRSD INTERVAL: 86 MS
QRSD INTERVAL: 88 MS
QRSD INTERVAL: 94 MS
QT INTERVAL: 380 MS
QT INTERVAL: 386 MS
QT INTERVAL: 388 MS
QTC INTERVAL: 418 MS
QTC INTERVAL: 424 MS
QTC INTERVAL: 428 MS
RBC # BLD AUTO: 4.98 MILLION/UL (ref 3.88–5.62)
T WAVE AXIS: 38 DEGREES
T WAVE AXIS: 39 DEGREES
T WAVE AXIS: 57 DEGREES
VENTRICULAR RATE: 72 BPM
VENTRICULAR RATE: 73 BPM
VENTRICULAR RATE: 74 BPM
WBC # BLD AUTO: 6.13 THOUSAND/UL (ref 4.31–10.16)

## 2024-08-09 PROCEDURE — 93010 ELECTROCARDIOGRAM REPORT: CPT | Performed by: INTERNAL MEDICINE

## 2024-08-09 PROCEDURE — 99239 HOSP IP/OBS DSCHRG MGMT >30: CPT | Performed by: NURSE PRACTITIONER

## 2024-08-09 PROCEDURE — 85027 COMPLETE CBC AUTOMATED: CPT | Performed by: NURSE PRACTITIONER

## 2024-08-09 PROCEDURE — 85730 THROMBOPLASTIN TIME PARTIAL: CPT | Performed by: INTERNAL MEDICINE

## 2024-08-09 PROCEDURE — 93970 EXTREMITY STUDY: CPT | Performed by: SURGERY

## 2024-08-09 RX ADMIN — PANTOPRAZOLE SODIUM 40 MG: 40 TABLET, DELAYED RELEASE ORAL at 06:15

## 2024-08-09 RX ADMIN — HEPARIN SODIUM 13 UNITS/KG/HR: 10000 INJECTION, SOLUTION INTRAVENOUS at 04:26

## 2024-08-09 RX ADMIN — APIXABAN 10 MG: 5 TABLET, FILM COATED ORAL at 10:01

## 2024-08-09 RX ADMIN — DORZOLAMIDE HCL 1 DROP: 20 SOLUTION/ DROPS OPHTHALMIC at 08:35

## 2024-08-09 RX ADMIN — LORATADINE 10 MG: 10 TABLET ORAL at 08:35

## 2024-08-09 RX ADMIN — DILTIAZEM HYDROCHLORIDE 180 MG: 180 CAPSULE, COATED, EXTENDED RELEASE ORAL at 08:35

## 2024-08-09 RX ADMIN — DEXTROAMPHETAMINE SACCHARATE, AMPHETAMINE ASPARTATE, DEXTROAMPHETAMINE SULFATE AND AMPHETAMINE SULFATE 20 MG: 2.5; 2.5; 2.5; 2.5 TABLET ORAL at 08:33

## 2024-08-09 RX ADMIN — FLUTICASONE PROPIONATE 2 SPRAY: 50 SPRAY, METERED NASAL at 08:36

## 2024-08-09 RX ADMIN — OMEGA-3 FATTY ACIDS CAP 1000 MG 2000 MG: 1000 CAP at 08:35

## 2024-08-09 NOTE — ASSESSMENT & PLAN NOTE
Presented with PE found on CTA chest ordered by his PCP due to dyspnea on exertion, palpitations, uncontrolled BP and not feeling well  No prior history of personal or familial VTE   CTA chest- saddle bullous and extensive bilateral pulmonary arterial filling defects consistent with PE.  RV/LV ratio is WNL.   BNP 26. Hemodynamically stable. No chest pain or dyspnea.   Echocardiogram 4/17/2024 with LVEF of 55%. Mild bi-atrium dilation.  Aortic root is moderately dilated. Mildly dilated ascending aorta.  Echocardiogram 8/9/2024 LVEF of 57%.  Left atrium is normal size.  Right atrium is mildly dilated.  Aortic root and ascending aorta are mildly dilated.   Venous duplex- acute non-occlusive DVT  in one of the right gastrocnemius veins in the proximal calf.  An acute DVT in the left popliteal vein.  Chronic nonocclusive superficial thrombophlebitis in the left great saphenous vein.  Etiology is unclear. This appears to be unprovoked event.   Received heparin infusion. Transitioned to eliquis.   Outpatient hematology/oncology referral   Will need to maintain on AC for at least 3-6 months

## 2024-08-09 NOTE — PROGRESS NOTES
All discharge instructions reviewed with patient.  Telemetry removed.  Masimo removed.  Heplock removed.  Pt discharged, but awaiting prescription coverage and availability of eliquis.

## 2024-08-09 NOTE — INCIDENTAL FINDINGS
The following findings require follow up:  Radiographic finding   Finding: Enlarged fatty liver. Right hepatic lobe hypoattenuating lesion with discontinuous peripheral enhancement measuring 3.3 x 2.5 cm at the hepatic dome most consistent with hemangioma.    Follow up required: US right upper quadrant    Follow up should be done within 4-6 week(s)    Please notify the following clinician to assist with the follow up:   Dr. Blackman    Incidental finding results were discussed with the Patient by LIANNA Stuart on 08/09/24.   They expressed understanding and all questions answered.

## 2024-08-09 NOTE — CASE MANAGEMENT
Case Management Progress Note    Patient name Bro Roldan  Location /-01 MRN [id number]  : 1975 Date 2024       LOS (days): 0  Geometric Mean LOS (GMLOS) (days):   Days to GMLOS:        OBJECTIVE:     Current admission status: Observation  Preferred Pharmacy:   [id number] - [facility] [address]  [address] [address]  Phone: [phone] Fax: [phone]    [address] [address]  Phone: [phone] Fax: [phone]    Primary Care Provider: Roberto Blackman DO    Primary Insurance: ARABELLA MCGEE  Secondary Insurance:     PROGRESS NOTE:  Pt was admitted to the hospital for PE.  Pt was here under the Observation status.  Pt had been on a heparin drip and was transitioned to Eliquis.  Pt has been evaluated by [facility] and is cleared for discharge.  Pt has an AMPAC of 24 and will have no needs upon DC.

## 2024-08-09 NOTE — DISCHARGE INSTR - AVS FIRST PAGE
Medication changes   Continue taking cardizem 180 mg daily. Please continue to monitor your blood pressure and start a blood pressure diary.   Eliquis 10 mg twice daily for total of 7 days-through 8/15; start taking Eliquis 5 mg twice daily on 8/16    Please avoid taking NSAIDs- ibuprofen, Advil, Aleve while you are on the blood thinner because this could contribute to worsening bleed.

## 2024-08-09 NOTE — UTILIZATION REVIEW
Initial Clinical Review    Admission: Date/Time/Statement:   Admission Orders (From admission, onward)       Ordered        08/08/24 1238  Place in Observation  Once                          Orders Placed This Encounter   Procedures    Place in Observation     Standing Status:   Standing     Number of Occurrences:   1     Order Specific Question:   Level of Care     Answer:   Med Surg [16]     ED Arrival Information       Expected   -    Arrival   8/8/2024 11:40    Acuity   Emergent              Means of arrival   Walk-In    Escorted by   Self    Service   Hospitalist    Admission type   Emergency              Arrival complaint   sent by cat scan, pulmunary ambulisum             Chief Complaint   Patient presents with    Evaluation of Abnormal Diagnostic Test     +CT showing + PE       Initial Presentation: 48 y.o. male to the ED from home with complaints of abnormal CTA showing PE.  Admitted under observation for PE.  H/O uncontrolled hypertension, anxiety, and asthma . Has been having dyspnea on exertion at home, PCP ordered CTA chest.  Etiology of PE unclear.  Started on IV hepain in the ED.  Check ECHo, lower extremity duplex.          Date:     Day 2:      ED Triage Vitals [08/08/24 1150]   Temperature Pulse Respirations Blood Pressure SpO2 Pain Score   98 °F (36.7 °C) 66 18 144/98 98 % No Pain     Weight (last 2 days)       Date/Time Weight    08/08/24 1500 133 (294)    08/08/24 1321 134 (294.65)    08/08/24 1150 134 (295)            Vital Signs (last 3 days)       Date/Time Temp Pulse Resp BP MAP (mmHg) SpO2 O2 Device Patient Position - Orthostatic VS Pain    08/09/24 07:32:09 98.4 °F (36.9 °C) 61 17 131/84 100 92 % -- -- --    08/09/24 0300 97.5 °F (36.4 °C) 68 17 132/87 101 93 % -- Lying --    08/08/24 2320 -- -- -- -- -- -- -- -- 8    08/08/24 2300 98 °F (36.7 °C) 75 18 137/81 100 94 % -- Lying --    08/08/24 1900 98 °F (36.7 °C) 78 17 130/88 102 93 % -- Lying --    08/08/24 1657 -- -- -- -- -- -- -- -- 5     08/08/24 15:15:29 97.4 °F (36.3 °C) -- 16 144/97 113 -- -- Lying --    08/08/24 1500 -- 67 -- 148/95 -- -- -- -- --    08/08/24 1321 97.8 °F (36.6 °C) 93 18 148/95 -- 95 % None (Room air) Sitting No Pain    08/08/24 1150 98 °F (36.7 °C) 66 18 144/98 -- 98 % None (Room air) Sitting No Pain              Pertinent Labs/Diagnostic Test Results:   Radiology:   VAS VENOUS DUPLEX - LOWER LIMB BILATERAL    (Results Pending)     Cardiology:  Echo follow up/limited w/ contrast if indicated   Final Result by Ronnie Lloyd DO (08/08 1709)        Left Ventricle: Left ventricular cavity size is normal. Wall thickness    is mildly increased. There is mild concentric hypertrophy. The left    ventricular ejection fraction is %. Systolic function is normal. Wall    motion is normal. Diastolic function is normal.     Right Ventricle: Right ventricular cavity size is mildly dilated.    Systolic function is low normal.     Right Atrium: The atrium is mildly dilated.     Aorta: The aortic root is mildly dilated. The ascending aorta is mildly    dilated.     Pericardium: There is no pericardial effusion.               Results from last 7 days   Lab Units 08/09/24  0601 08/08/24  1200 08/07/24  1416   WBC Thousand/uL 6.13 9.27 8.01   HEMOGLOBIN g/dL 15.5 16.4 16.3   HEMATOCRIT % 47.5 49.9* 48.0   PLATELETS Thousands/uL 197 214 204   TOTAL NEUT ABS Thousands/µL  --  4.88 3.82         Results from last 7 days   Lab Units 08/08/24  1200 08/07/24  1416   SODIUM mmol/L 135 139   POTASSIUM mmol/L 4.6 4.7   CHLORIDE mmol/L 101 103   CO2 mmol/L 27 29   ANION GAP mmol/L 7 7   BUN mg/dL 14 18   CREATININE mg/dL 0.93 0.84   EGFR ml/min/1.73sq m 96 103   CALCIUM mg/dL 9.8 9.8     Results from last 7 days   Lab Units 08/07/24  1416   AST U/L 17   ALT U/L 21   ALK PHOS U/L 39   TOTAL PROTEIN g/dL 7.2   ALBUMIN g/dL 4.2   TOTAL BILIRUBIN mg/dL 0.74         Results from last 7 days   Lab Units 08/08/24  1200 08/07/24  1416   GLUCOSE RANDOM mg/dL 87 88      Results from last 7 days   Lab Units 08/07/24  1416   D-DIMER QUANTITATIVE ug/ml FEU 8.07*     Results from last 7 days   Lab Units 08/09/24  0316 08/08/24  1805 08/08/24  1200   PROTIME seconds  --   --  13.2   INR   --   --  0.95   PTT seconds 92* 171* 27     Results from last 7 days   Lab Units 08/07/24  1416   TSH 3RD GENERATON uIU/mL 1.154       Results from last 7 days   Lab Units 08/07/24  1416   BNP pg/mL 26         ED Treatment-Medication Administration from 08/08/2024 1140 to 08/08/2024 1319         Date/Time Order Dose Route Action     08/08/2024 1249 heparin (porcine) injection 10,000 Units 10,000 Units Intravenous Given     08/08/2024 1252 heparin (porcine) 25,000 units in 0.45% NaCl 250 mL infusion (premix) 18 Units/kg/hr Intravenous New Bag            Past Medical History:   Diagnosis Date    Allergic     Anxiety     Arthritis     Asthma     Depression     GERD (gastroesophageal reflux disease)     Hypertension     Inflammatory bowel disease     Obesity     Visual impairment        Admitting Diagnosis: Abnormal finding of diagnostic imaging [R93.89]  Age/Sex: 48 y.o. male  Admission Orders:  Scheduled Medications:  amphetamine-dextroamphetamine, 20 mg, Oral, BID  diltiazem, 180 mg, Oral, Daily  dorzolamide, 1 drop, Both Eyes, TID  famotidine, 40 mg, Oral, HS  fish oil, 2,000 mg, Oral, Daily  fluticasone, 2 spray, Each Nare, Daily  loratadine, 10 mg, Oral, Daily  pantoprazole, 40 mg, Oral, Early Morning  pravastatin, 80 mg, Oral, Daily With Dinner      Continuous IV Infusions:  heparin (porcine), 3-30 Units/kg/hr (Order-Specific), Intravenous, Titrated      PRN Meds:  acetaminophen, 975 mg, Oral, Q8H PRN  albuterol, 1 puff, Inhalation, Q4H PRN  heparin (porcine), 10,000 Units, Intravenous, Q6H PRN  heparin (porcine), 5,000 Units, Intravenous, Q6H PRN  hydrOXYzine HCL, 25 mg, Oral, TID PRN  zolpidem, 5 mg, Oral, HS PRN        IP CONSULT TO CASE MANAGEMENT    Network Utilization Review  Department  ATTENTION: Please call with any questions or concerns to [phone] and carefully listen to the prompts so that you are directed to the right person. All voicemails are confidential.   For Discharge needs, contact Care Management DC Support Team at [phone] opt. [phone]  Send all requests for admission clinical reviews, approved or denied determinations and any other requests to dedicated fax number below belonging to the campus where the patient is receiving treatment. List of dedicated fax numbers for the Facilities:  FACILITY NAME UR FAX NUMBER   ADMISSION DENIALS (Administrative/Medical Necessity) [phone]   DISCHARGE SUPPORT TEAM (NETWORK) [phone]   PARENT CHILD HEALTH (Maternity/NICU/Pediatrics) [phone]   [facility] [phone]   [facility] [phone]   [facility] [phone]   [facility] [phone]   [facility] [phone]   [facility] [phone]   [facility] [phone]   [facility] [phone]   [facility] [phone]   [facility] [phone]   [facility] [phone]   [facility] [phone]

## 2024-08-09 NOTE — DISCHARGE SUMMARY
[facility]  Discharge- Bro Roldan 1975, 48 y.o. male MRN: [id number]  Unit/Bed#: -[facility] Encounter: [id number]  Primary Care Provider: Roberto Blackman DO   Date and time admitted to hospital: 8/8/2024 11:46 AM    * PE (pulmonary thromboembolism) (HCC)  Assessment & Plan  Presented with PE found on CTA chest ordered by his PCP due to dyspnea on exertion, palpitations, uncontrolled BP and not feeling well  No prior history of personal or familial VTE   CTA chest- saddle bullous and extensive bilateral pulmonary arterial filling defects consistent with PE.  RV/LV ratio is WNL.   BNP 26. Hemodynamically stable. No chest pain or dyspnea.   Echocardiogram 4/17/2024 with LVEF of 55%. Mild bi-atrium dilation.  Aortic root is moderately dilated. Mildly dilated ascending aorta.  Echocardiogram 8/9/2024 LVEF of 57%.  Left atrium is normal size.  Right atrium is mildly dilated.  Aortic root and ascending aorta are mildly dilated.   Venous duplex- acute non-occlusive DVT  in one of the right gastrocnemius veins in the proximal calf.  An acute DVT in the left popliteal vein.  Chronic nonocclusive superficial thrombophlebitis in the left great saphenous vein.  Etiology is unclear. This appears to be unprovoked event.   Received heparin infusion. Transitioned to eliquis.   Outpatient hematology/oncology referral   Will need to maintain on AC for at least 3-6 months     JORGE (obstructive sleep apnea)  Assessment & Plan  In the process of getting a CPAP outpatient      Attention deficit hyperactivity disorder (ADHD), predominantly inattentive type  Assessment & Plan  Continue with adderall      Hypercholesterolemia  Assessment & Plan  Continue with statin       Essential hypertension  Assessment & Plan  Not well-controlled   BP noted to be in 150s/990-100s at home   Reported that he was on toprol and valsartan in the past   Has been following with his PCP to manage this last seen 8/7  Started on  cardizem single agent outpatient. Avoiding BB in the setting of asthma. Will continue.   Improving with medication   Monitor BP closely and adjust medication as indicated outpatient     Asthma, currently inactive  Assessment & Plan  In no acute exacerbation  Continue with albuterol as needed         Discharging Physician / Practitioner: LIANNA Stuart  PCP: Roberto Blackman DO  Admission Date:   Admission Orders (From admission, onward)       Ordered        08/08/24 1238  Place in Observation  Once                          Discharge Date: 08/09/24    Reason for Admission: PE     Discharge Diagnoses:     Principal Problem:    PE (pulmonary thromboembolism) (HCC)  Active Problems:    Asthma, currently inactive    Essential hypertension    Hypercholesterolemia    Attention deficit hyperactivity disorder (ADHD), predominantly inattentive type    JORGE (obstructive sleep apnea)  Resolved Problems:    * No resolved hospital problems. *      Consultations During Hospital Stay:  IP CONSULT TO CASE MANAGEMENT    Procedures Performed:     None     Significant Findings / Test Results:     Echo follow up/limited w/ contrast if indicated  Result Date: 8/8/2024    Left Ventricle: Left ventricular cavity size is normal. Wall thickness is mildly increased. There is mild concentric hypertrophy. The left ventricular ejection fraction is %. Systolic function is normal. Wall motion is normal. Diastolic function is normal.   Right Ventricle: Right ventricular cavity size is mildly dilated. Systolic function is low normal.   Right Atrium: The atrium is mildly dilated.   Aorta: The aortic root is mildly dilated. The ascending aorta is mildly dilated.   Pericardium: There is no pericardial effusion.     CTA chest (pe study) abdomen pelvis contrast  Result Date: 8/8/2024  1. Saddle embolus and extensive bilateral pulmonary arterial filling defects consistent with PE. RV/LV ratio remains within normal limits. 2. No acute findings  throughout the abdomen and pelvis. 3. 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 Ultrasound initially recommended for further assessment.     Incidental Findings:   Right hepatic dome hemangioma     Test Results Pending at Discharge (will require follow up):   None      Outpatient Tests Requested:  Follow up with PCP and hematology     Complications:  none     Hospital Course:     Bro Roldan is a 48 y.o. male patient who originally presented to the hospital on 8/8/2024 due to worsening dyspnea on exertion, feeling unwell, and uncontrolled blood pressure.  Please refer to H&P for initial presenting complaints and symptoms.  In brief the patient was seen by his PCP on 8/7 with worsening dyspnea on exertion and generally feeling overall unwell.  The patient has been working with the PCP for his uncontrolled blood pressure and medications are being adjusted outpatient.  He obtained blood work and CTA of the chest and abdomen and pelvis; and found to have saddle PE and recommended by his PCP to present to the ED.  The patient was started on heparin infusion.  He is hemodynamically stable.  The patient is asymptomatic.  Echocardiogram and venous duplex of the lower extremities result noted above.  Unclear cause of his PE.  He will need an outpatient follow-up with hematology/oncology.  The patient is being discharged on Eliquis 10 mg twice daily x 7 days and transition to 5 mg twice daily until further directed by hematology/oncology.  Discharge planning was discussed with him in details.  The patient has a trip coming up within the next 24 hours to [place].  Risk and benefits discussed with him in details.  He stands to watch for symptoms and if he does not feel well he will seek medical attention as soon as possible.     Condition at Discharge: good     Discharge Day Visit / Exam:     Subjective:  patient was seen and examined. He states feeling well. Denies any dyspnea/chest pain.   Vitals: Blood Pressure: 131/84  "(08/09/24 0732)  Pulse: 61 (08/09/24 0732)  Temperature: 98.4 °F (36.9 °C) (08/09/24 0732)  Temp Source: Oral (08/09/24 0300)  Respirations: 17 (08/09/24 0732)  Height: 6' 4\" (193 cm) (08/08/24 1500)  Weight - Scale: 133 kg (294 lb) (08/08/24 1500)  SpO2: 92 % (08/09/24 0732)  Exam:   Physical Exam  Vitals and nursing note reviewed.   Constitutional:       General: He is not in acute distress.     Appearance: Normal appearance.   HENT:      Head: Normocephalic and atraumatic.      Right Ear: External ear normal.      Left Ear: External ear normal.      Nose: Nose normal.      Mouth/Throat:      Mouth: Mucous membranes are moist.      Pharynx: Oropharynx is clear.   Eyes:      General:         Right eye: No discharge.         Left eye: No discharge.      Extraocular Movements: Extraocular movements intact.      Pupils: Pupils are equal, round, and reactive to light.   Cardiovascular:      Rate and Rhythm: Normal rate and regular rhythm.      Pulses: Normal pulses.      Heart sounds: Normal heart sounds. No murmur heard.  Pulmonary:      Effort: Pulmonary effort is normal. No respiratory distress.      Breath sounds: Normal breath sounds. No wheezing or rales.   Abdominal:      General: Bowel sounds are normal. There is no distension.      Palpations: Abdomen is soft. There is no mass.      Tenderness: There is no abdominal tenderness.   Musculoskeletal:         General: No swelling, tenderness or deformity. Normal range of motion.      Cervical back: Normal range of motion and neck supple. No rigidity.   Skin:     General: Skin is warm and dry.      Capillary Refill: Capillary refill takes less than 2 seconds.      Coloration: Skin is not pale.      Findings: No erythema.   Neurological:      General: No focal deficit present.      Mental Status: He is alert and oriented to person, place, and time. Mental status is at baseline.   Psychiatric:         Mood and Affect: Mood normal.         Behavior: Behavior normal.    "      Thought Content: Thought content normal.         Judgment: Judgment normal.       Discharge instructions/Information to patient and family:   See after visit summary for information provided to patient and family.      Provisions for Follow-Up Care:  See after visit summary for information related to follow-up care and any pertinent home health orders.      Disposition:     Home    Planned Readmission: No      Discharge Statement:  I spent 40 minutes discharging the patient. This time was spent on the day of discharge. I had direct contact with the patient on the day of discharge. Greater than 50% of the total time was spent examining patient, answering all patient questions, arranging and discussing plan of care with patient as well as directly providing post-discharge instructions.  Additional time then spent on discharge activities.    Discharge Medications:  See after visit summary for reconciled discharge medications provided to patient and family.      ** Please Note: This note has been constructed using a voice recognition system **

## 2024-08-09 NOTE — ASSESSMENT & PLAN NOTE
Not well-controlled   BP noted to be in 150s/990-100s at home   Reported that he was on toprol and valsartan in the past   Has been following with his PCP to manage this last seen 8/7  Started on cardizem single agent outpatient. Avoiding BB in the setting of asthma. Will continue.   Improving with medication   Monitor BP closely and adjust medication as indicated outpatient

## 2024-08-09 NOTE — PLAN OF CARE
Problem: PAIN - ADULT  Goal: Verbalizes/displays adequate comfort level or baseline comfort level  Description: Interventions:  - Encourage patient to monitor pain and request assistance  - Assess pain using appropriate pain scale  - Administer analgesics based on type and severity of pain and evaluate response  - Implement non-pharmacological measures as appropriate and evaluate response  - Consider cultural and social influences on pain and pain management  - Notify physician/advanced practitioner if interventions unsuccessful or patient reports new pain  Outcome: Progressing     Problem: INFECTION - ADULT  Goal: Absence or prevention of progression during hospitalization  Description: INTERVENTIONS:  - Assess and monitor for signs and symptoms of infection  - Monitor lab/diagnostic results  - Monitor all insertion sites, i.e. indwelling lines, tubes, and drains  - Monitor endotracheal if appropriate and nasal secretions for changes in amount and color  - [place] appropriate cooling/warming therapies per order  - Administer medications as ordered  - Instruct and encourage patient and family to use good hand hygiene technique  - Identify and instruct in appropriate isolation precautions for identified infection/condition  Outcome: Progressing  Goal: Absence of fever/infection during neutropenic period  Description: INTERVENTIONS:  - Monitor WBC    Outcome: Progressing     Problem: Knowledge Deficit  Goal: Patient/family/caregiver demonstrates understanding of disease process, treatment plan, medications, and discharge instructions  Description: Complete learning assessment and assess knowledge base.  Interventions:  - Provide teaching at level of understanding  - Provide teaching via preferred learning methods  Outcome: Progressing

## 2024-08-12 ENCOUNTER — TELEPHONE (OUTPATIENT)
Age: 49
End: 2024-08-12

## 2024-08-12 NOTE — TELEPHONE ENCOUNTER
Pt reports he tried to set a hematology/oncology appt and they can't see him till September. Pt is really concerned since he was told to be seen as soon as possible. The pt would like a call back from PCP, as soon as possible and would like to know if PCP can find him another hem specialist sooner.  Please call pt back he is very concerned and stressed from this process.  Thank you

## 2024-08-20 ENCOUNTER — APPOINTMENT (OUTPATIENT)
Dept: LAB | Facility: HOSPITAL | Age: 49
End: 2024-08-20
Payer: COMMERCIAL

## 2024-08-20 ENCOUNTER — OFFICE VISIT (OUTPATIENT)
Dept: INTERNAL MEDICINE CLINIC | Facility: CLINIC | Age: 49
End: 2024-08-20
Payer: COMMERCIAL

## 2024-08-20 VITALS
WEIGHT: 293.2 LBS | HEIGHT: 76 IN | TEMPERATURE: 97.1 F | OXYGEN SATURATION: 99 % | SYSTOLIC BLOOD PRESSURE: 120 MMHG | HEART RATE: 75 BPM | DIASTOLIC BLOOD PRESSURE: 74 MMHG | BODY MASS INDEX: 35.7 KG/M2

## 2024-08-20 DIAGNOSIS — I26.92 ACUTE SADDLE PULMONARY EMBOLISM WITHOUT ACUTE COR PULMONALE (HCC): Primary | ICD-10-CM

## 2024-08-20 DIAGNOSIS — Z23 ENCOUNTER FOR IMMUNIZATION: ICD-10-CM

## 2024-08-20 DIAGNOSIS — I26.92 ACUTE SADDLE PULMONARY EMBOLISM WITHOUT ACUTE COR PULMONALE (HCC): ICD-10-CM

## 2024-08-20 DIAGNOSIS — J32.9 SINOBRONCHITIS: ICD-10-CM

## 2024-08-20 DIAGNOSIS — I82.403 ACUTE DEEP VEIN THROMBOSIS (DVT) OF BOTH LOWER EXTREMITIES, UNSPECIFIED VEIN (HCC): ICD-10-CM

## 2024-08-20 DIAGNOSIS — I10 PRIMARY HYPERTENSION: ICD-10-CM

## 2024-08-20 DIAGNOSIS — J40 SINOBRONCHITIS: ICD-10-CM

## 2024-08-20 DIAGNOSIS — Z79.01 CHRONIC ANTICOAGULATION: ICD-10-CM

## 2024-08-20 LAB
APTT PPP: 33 SECONDS (ref 23–34)
INR PPP: 1.22 (ref 0.85–1.19)
PROTHROMBIN TIME: 15.9 SECONDS (ref 12.3–15)

## 2024-08-20 PROCEDURE — 85300 ANTITHROMBIN III ACTIVITY: CPT

## 2024-08-20 PROCEDURE — 85303 CLOT INHIBIT PROT C ACTIVITY: CPT

## 2024-08-20 PROCEDURE — 36415 COLL VENOUS BLD VENIPUNCTURE: CPT

## 2024-08-20 PROCEDURE — 85306 CLOT INHIBIT PROT S FREE: CPT

## 2024-08-20 PROCEDURE — 85730 THROMBOPLASTIN TIME PARTIAL: CPT

## 2024-08-20 PROCEDURE — 86147 CARDIOLIPIN ANTIBODY EA IG: CPT

## 2024-08-20 PROCEDURE — 85610 PROTHROMBIN TIME: CPT

## 2024-08-20 PROCEDURE — 99214 OFFICE O/P EST MOD 30 MIN: CPT | Performed by: INTERNAL MEDICINE

## 2024-08-20 RX ORDER — CODEINE PHOSPHATE/GUAIFENESIN 10-100MG/5
5 LIQUID (ML) ORAL 3 TIMES DAILY PRN
Qty: 120 ML | Refills: 0 | Status: SHIPPED | OUTPATIENT
Start: 2024-08-20

## 2024-08-20 RX ORDER — AZITHROMYCIN 250 MG/1
TABLET, FILM COATED ORAL
Qty: 6 TABLET | Refills: 0 | Status: SHIPPED | OUTPATIENT
Start: 2024-08-20 | End: 2024-08-25

## 2024-08-20 RX ORDER — FLUTICASONE PROPIONATE 50 MCG
1 SPRAY, SUSPENSION (ML) NASAL DAILY
Qty: 16 G | Refills: 0 | Status: SHIPPED | OUTPATIENT
Start: 2024-08-20

## 2024-08-20 RX ORDER — GUAIFENESIN 600 MG/1
600 TABLET, EXTENDED RELEASE ORAL EVERY 12 HOURS SCHEDULED
Qty: 20 TABLET | Refills: 0 | Status: SHIPPED | OUTPATIENT
Start: 2024-08-20

## 2024-08-20 NOTE — PROGRESS NOTES
Ambulatory Visit  Name: Bro Roldan      : 1975      MRN: [id number]  Encounter Provider: Abdiel Acharya DO  Encounter Date: 2024   Encounter department: formerly [facility]    Assessment & Plan   1. Acute saddle pulmonary embolism without acute cor pulmonale (HCC)  -     Ambulatory referral to Hematology / Oncology; Future  -     Antithrombin III Activity; Future  -     APTT; Future  -     Cardiolipin antibody; Future  -     Factor 5 leiden; Future  -     MTHFR mutation; Future  -     Protein C and S Activity With Reflex To Protein C and/or S Antigen; Future  -     Protime-INR; Future  2. Encounter for immunization  -     Pneumococcal Conjugate Vaccine 20-valent (Pcv20)  3. Acute deep vein thrombosis (DVT) of both lower extremities, unspecified vein (HCC)  -     Ambulatory referral to Hematology / Oncology; Future  -     Antithrombin III Activity; Future  -     APTT; Future  -     Cardiolipin antibody; Future  -     Factor 5 leiden; Future  -     MTHFR mutation; Future  -     Protein C and S Activity With Reflex To Protein C and/or S Antigen; Future  -     Protime-INR; Future  4. Chronic anticoagulation  -     Ambulatory referral to Hematology / Oncology; Future  5. Primary hypertension  6. Sinobronchitis  -     azithromycin (ZITHROMAX) 250 mg tablet; Take 2 tablets today then 1 tablet daily x 4 days  -     guaiFENesin (Mucinex) 600 mg 12 hr tablet; Take 1 tablet (600 mg total) by mouth every 12 (twelve) hours  -     fluticasone (FLONASE) 50 mcg/act nasal spray; 1 spray into each nostril daily  -     guaifenesin-codeine (GUAIFENESIN AC) 100-10 MG/5ML liquid; Take 5 mL by mouth 3 (three) times a day as needed for cough     A/P. Stable and tolerating the meds. VSS are acceptable.. Activity level improving as is his breathing until he contracted the URI.. No pain. Appears to be unprovoke with pt denying trauma, illness, prolong immobility or smoking. No Fhx of bleeding/clotting issue. Is  obese. Will check hypercoagulable labs and refer to Heme. Continue eliquis for now and needs at least six months therapy, but may need longer. Await heme imput. Discussed the importance of continuing to be vigliant as just being on DOAC doesn't insure no future events. Discussed need to be active, minimize immobility like long car drives and flying at least initially. Will treat his URI. If fatigue continues, ?PT vs cardiopulmonary rehab. Htn is better and will continue current meds. RTC as scheduled. Pt concernced about Heme visit out six weeks. Discussed that we can start w/u with labs and that treatment wouldn't change during this time as he would need to be on DOAC anyway. Pt to get on a cancellation list.     History of Present Illness     WM RTC one week or so, after reporting several weeks of not feeling well, SOB/NEELY,etc. BP elevated  and started on CCB due to h/o asthma. Labs ordered and d-dimer elevated. CXR normal. Sent for CT PE and positive for saddle PE. Subsequent venous duplex with bilat LE DVT. Pt admitted and started on anticoagulation and d/c'd on eliquis. Echo with normal EF and mild KEITH only. No s/s of cor pulmonale. No apparent reason for DVT/PE. Since d/c, doing a little better. No fever or chills. No CP, palpitations, edema, or LE pain. SOB improving. Tolerating the meds and no new issues. No reports of bleeding events. Was well enough to go on vacation to [place] and now notes feeling fatigued, with URI s/s including congestion, cough, and SOB at times. Flew on an airplane. .         Review of Systems   Constitutional:  Positive for activity change and fatigue. Negative for chills, diaphoresis and fever.   HENT:  Positive for congestion, postnasal drip, rhinorrhea, sinus pressure and sore throat. Negative for ear discharge, ear pain, facial swelling, nosebleeds and sinus pain.    Respiratory:  Positive for cough and shortness of breath. Negative for chest tightness and wheezing.   "  Cardiovascular:  Negative for chest pain, palpitations and leg swelling.   Gastrointestinal:  Negative for abdominal pain, anal bleeding, blood in stool, constipation, diarrhea, nausea, rectal pain and vomiting.   Genitourinary:  Negative for difficulty urinating, dysuria, frequency and hematuria.   Musculoskeletal:  Negative for arthralgias, gait problem and myalgias.   Neurological:  Positive for headaches. Negative for dizziness, seizures, weakness and light-headedness.   Hematological:  Does not bruise/bleed easily.   Psychiatric/Behavioral:  Negative for confusion, dysphoric mood and sleep disturbance. The patient is not nervous/anxious.        Objective     /74   Pulse 75   Temp (!) 97.1 °F (36.2 °C)   Ht 6' 4\" (1.93 m)   Wt 133 kg (293 lb 3.2 oz)   SpO2 99%   BMI 35.69 kg/m²     Physical Exam  Vitals and nursing note reviewed.   Constitutional:       General: He is not in acute distress.     Appearance: Normal appearance. He is obese. He is not ill-appearing.   HENT:      Head: Normocephalic and atraumatic.      Right Ear: Tympanic membrane, ear canal and external ear normal. There is no impacted cerumen.      Left Ear: Tympanic membrane, ear canal and external ear normal. There is no impacted cerumen.      Nose: Congestion and rhinorrhea present.      Mouth/Throat:      Mouth: Mucous membranes are moist.      Pharynx: Posterior oropharyngeal erythema present. No oropharyngeal exudate.   Eyes:      Extraocular Movements: Extraocular movements intact.      Conjunctiva/sclera: Conjunctivae normal.      Pupils: Pupils are equal, round, and reactive to light.   Cardiovascular:      Rate and Rhythm: Normal rate and regular rhythm.      Heart sounds: Normal heart sounds. No murmur heard.  Pulmonary:      Effort: Pulmonary effort is normal. No respiratory distress.      Breath sounds: No wheezing, rhonchi or rales.   Abdominal:      General: Bowel sounds are normal. There is no distension.      " Palpations: Abdomen is soft.      Tenderness: There is no abdominal tenderness.   Musculoskeletal:         General: No tenderness.      Cervical back: Neck supple. No tenderness.      Right lower leg: No edema.      Left lower leg: Edema (trace LE edema) present.   Lymphadenopathy:      Cervical: No cervical adenopathy.   Neurological:      General: No focal deficit present.      Mental Status: He is alert and oriented to person, place, and time. Mental status is at baseline.   Psychiatric:         Mood and Affect: Mood normal.         Behavior: Behavior normal.         Thought Content: Thought content normal.         Judgment: Judgment normal.     Scheduled Medication Review:  Pt's scheduled medication use was reviewed by myself/staff via the PDMP website. Pt's use has been found to be appropriate w/o any concerns for misuse by the patient. Pt's current conditions require continued scheduled medication use at this time. Future review for continued appropriate medication use and misuse will continue.   Administrative Statements

## 2024-08-20 NOTE — PATIENT INSTRUCTIONS
Patient Education     Apixaban (a PIX a ban)   Brand Names: US Eliquis; Eliquis DVT/PE Starter Pack   Brand Names: Ginger ACH-Apixaban; AG-Apixaban; APO-Apixaban; AURO-Apixaban; BIO-Apixaban; Eliquis; JAMP-Apixaban; M-Apixaban; MAR-Apixaban; MINT-Apixaban; MAKAYLA-Apixaban; NRA-Apixaban; PRO-Apixaban; LOGAN-Apixaban; SANDOZ Apixaban SDZ; TARO-Apixaban; TEVA Apixaban   Warning   Do not stop taking this drug without talking to the doctor who ordered it for you. Stopping this drug when you are not supposed to may raise the chance of blood clots. This includes stroke in certain people. You may need to stop this drug before certain types of dental or health care. Your doctor will tell you when to start taking it again. Follow what your doctor tells you closely.  People who have any type of spinal or epidural procedure are more likely to have bleeding problems around the spine when already on this drug. This bleeding rarely happens, but can lead to not being able to move body (paralysis) long-term or paralysis that will not go away. The risk is raised in people who have problems with their spine, a certain type of epidural catheter, or have had spinal surgery. The risk is also raised in people who take any other drugs that may affect blood clotting, like blood-thinner drugs (like warfarin), aspirin, or nonsteroidal anti-inflammatory drugs (NSAIDs) like ibuprofen or naproxen.  Tell your doctor you use this drug before you have a spinal or epidural procedure. Call your doctor right away if you have any signs of nerve problems like back pain, numbness or tingling, muscle weakness, paralysis, or loss of bladder or bowel control.  Talk with your doctor if you have recently had or will be having a spinal or epidural procedure. Some time may need to pass between the use of this drug and your procedure. Talk with your doctor.  What is this drug used for?   It is used to treat or prevent blood clots.  What do I need to tell my doctor  BEFORE I take this drug?   If you are allergic to this drug; any part of this drug; or any other drugs, foods, or substances. Tell your doctor about the allergy and what signs you had.  If you have any of these health problems: Active bleeding or liver problems.  If you have antiphospholipid syndrome (APS).  If you have had or plan to have a heart valve replaced.  If you are taking any of these drugs: Carbamazepine, itraconazole, ketoconazole, phenytoin, rifampin, ritonavir, or Marvin's wort.  If you are breast-feeding. Do not breast-feed while you take this drug.  This is not a list of all drugs or health problems that interact with this drug.  Tell your doctor and pharmacist about all of your drugs (prescription or OTC, natural products, vitamins) and health problems. You must check to make sure that it is safe for you to take this drug with all of your drugs and health problems. Do not start, stop, or change the dose of any drug without checking with your doctor.  What are some things I need to know or do while I take this drug?   Tell all of your health care providers that you take this drug. This includes your doctors, nurses, pharmacists, and dentists. This drug may need to be stopped before certain types of surgery as your doctor has told you. If this drug is stopped, your doctor will tell you when to start taking this drug again after your surgery or procedure.  Have blood work checked as you have been told by the doctor. Talk with the doctor.  Do not run out of this drug.  You may bleed more easily. Be careful and avoid injury. Use a soft toothbrush and an electric razor. Rarely, some bleeding problems have been deadly.  If you fall or hurt yourself, or if you hit your head, call your doctor right away. Talk with your doctor even if you feel fine.  Tell your doctor if you are pregnant or plan on getting pregnant. You will need to talk about the benefits and risks of using this drug while you are  pregnant.  What are some side effects that I need to call my doctor about right away?   WARNING/CAUTION: Even though it may be rare, some people may have very bad and sometimes deadly side effects when taking a drug. Tell your doctor or get medical help right away if you have any of the following signs or symptoms that may be related to a very bad side effect:  Signs of an allergic reaction, like rash; hives; itching; red, swollen, blistered, or peeling skin with or without fever; wheezing; tightness in the chest or throat; trouble breathing, swallowing, or talking; unusual hoarseness; or swelling of the mouth, face, lips, tongue, or throat.  Signs of bleeding like throwing up or coughing up blood; vomit that looks like coffee grounds; blood in the urine; black, red, or tarry stools; bleeding from the gums; abnormal vaginal bleeding; bruises without a cause or that get bigger; or bleeding you cannot stop.  Weakness on 1 side of the body, trouble speaking or thinking, change in balance, drooping on one side of the face, or blurred eyesight.  Dizziness or passing out.  Feeling tired or weak.  Feeling confused.  Headache.  Joint pain or swelling.  Chest pain or pressure.  What are some other side effects of this drug?   All drugs may cause side effects. However, many people have no side effects or only have minor side effects. Call your doctor or get medical help if you have any side effects that bother you or do not go away.  These are not all of the side effects that may occur. If you have questions about side effects, call your doctor. Call your doctor for medical advice about side effects.  You may report side effects to your national health agency.  You may report side effects to the FDA at 1-887.265.8620. You may also report side effects at https://www.fda.gov/medwatch.  How is this drug best taken?   Use this drug as ordered by your doctor. Read all information given to you. Follow all instructions  closely.  Take with or without food.  If you have trouble swallowing this drug, it can be crushed and mixed in water, apple juice, or applesauce. If you crush and mix this drug, take it within 4 hours of mixing.  Keep taking this drug as you have been told by your doctor or other health care provider, even if you feel well.  Those who have feeding tubes may use this drug. Use as you have been told. Flush the feeding tube after this drug is given.  What do I do if I miss a dose?   Take a missed dose as soon as you think about it.  If it is close to the time for your next dose, skip the missed dose and go back to your normal time.  Do not take 2 doses at the same time or extra doses.  How do I store and/or throw out this drug?   Store at room temperature in a dry place. Do not store in a bathroom.  Keep all drugs in a safe place. Keep all drugs out of the reach of children and pets.  Throw away unused or  drugs. Do not flush down a toilet or pour down a drain unless you are told to do so. Check with your pharmacist if you have questions about the best way to throw out drugs. There may be drug take-back programs in your area.  General drug facts   If your symptoms or health problems do not get better or if they become worse, call your doctor.  Do not share your drugs with others and do not take anyone else's drugs.  Some drugs may have another patient information leaflet. If you have any questions about this drug, please talk with your doctor, nurse, pharmacist, or other health care provider.  This drug comes with an extra patient fact sheet called a Medication Guide. Read it with care. Read it again each time this drug is refilled. If you have any questions about this drug, please talk with the doctor, pharmacist, or other health care provider.  If you think there has been an overdose, call your poison control center or get medical care right away. Be ready to tell or show what was taken, how much, and when it  happened.  Consumer Information Use and Disclaimer   This generalized information is a limited summary of diagnosis, treatment, and/or medication information. It is not meant to be comprehensive and should be used as a tool to help the user understand and/or assess potential diagnostic and treatment options. It does NOT include all information about conditions, treatments, medications, side effects, or risks that may apply to a specific patient. It is not intended to be medical advice or a substitute for the medical advice, diagnosis, or treatment of a health care provider based on the health care provider's examination and assessment of a patient's specific and unique circumstances. Patients must speak with a health care provider for complete information about their health, medical questions, and treatment options, including any risks or benefits regarding use of medications. This information does not endorse any treatments or medications as safe, effective, or approved for treating a specific patient. [employer] and its affiliates disclaim any warranty or liability relating to this information or the use thereof. The use of this information is governed by the Terms of Use, available at https://www.wolterskluwer.com/en/know/clinical-effectiveness-terms.  Last Reviewed Date   2021-04-14  Copyright   © 2024 [employer] and its affiliates and/or licensors. All rights reserved.

## 2024-08-21 LAB — DEPRECATED AT III PPP: 131 % OF NORMAL (ref 92–136)

## 2024-08-22 DIAGNOSIS — Z00.6 ENCOUNTER FOR EXAMINATION FOR NORMAL COMPARISON OR CONTROL IN CLINICAL RESEARCH PROGRAM: ICD-10-CM

## 2024-08-23 LAB
CARDIOLIPIN IGA SER IA-ACNC: 2.7
CARDIOLIPIN IGG SER IA-ACNC: 0.6
CARDIOLIPIN IGM SER IA-ACNC: 2.6

## 2024-08-26 ENCOUNTER — APPOINTMENT (OUTPATIENT)
Dept: LAB | Facility: CLINIC | Age: 49
End: 2024-08-26

## 2024-08-26 DIAGNOSIS — Z00.6 ENCOUNTER FOR EXAMINATION FOR NORMAL COMPARISON OR CONTROL IN CLINICAL RESEARCH PROGRAM: ICD-10-CM

## 2024-08-26 PROCEDURE — 36415 COLL VENOUS BLD VENIPUNCTURE: CPT

## 2024-08-27 LAB — MISCELLANEOUS LAB TEST RESULT: NORMAL

## 2024-08-28 ENCOUNTER — TELEPHONE (OUTPATIENT)
Age: 49
End: 2024-08-28

## 2024-08-28 LAB

## 2024-08-28 NOTE — TELEPHONE ENCOUNTER
Pt called in to follow up on prior authorization on lab orders for ( Factor 5 leiden (Order [id number]) ). Pt states he hasn't received any notice and just wanted to clarify if he would get an update when he can get this lab drawn.  PCP PLEASE ADVISE PT.  Thank you

## 2024-09-07 LAB
APOB+LDLR+PCSK9 GENE MUT ANL BLD/T: NOT DETECTED
BRCA1+BRCA2 DEL+DUP + FULL MUT ANL BLD/T: NOT DETECTED
MLH1+MSH2+MSH6+PMS2 GN DEL+DUP+FUL M: NOT DETECTED

## 2024-09-10 ENCOUNTER — CONSULT (OUTPATIENT)
Dept: CARDIOLOGY CLINIC | Facility: HOSPITAL | Age: 49
End: 2024-09-10
Attending: INTERNAL MEDICINE
Payer: COMMERCIAL

## 2024-09-10 VITALS
DIASTOLIC BLOOD PRESSURE: 84 MMHG | SYSTOLIC BLOOD PRESSURE: 114 MMHG | WEIGHT: 292.4 LBS | HEART RATE: 67 BPM | OXYGEN SATURATION: 99 % | HEIGHT: 76 IN | BODY MASS INDEX: 35.61 KG/M2

## 2024-09-10 DIAGNOSIS — I51.7 BIATRIAL ENLARGEMENT: ICD-10-CM

## 2024-09-10 DIAGNOSIS — I51.7 LVH (LEFT VENTRICULAR HYPERTROPHY): ICD-10-CM

## 2024-09-10 DIAGNOSIS — R06.09 DOE (DYSPNEA ON EXERTION): ICD-10-CM

## 2024-09-10 DIAGNOSIS — I77.810 AORTIC ROOT DILATATION (HCC): ICD-10-CM

## 2024-09-10 DIAGNOSIS — R42 LIGHTHEADEDNESS: ICD-10-CM

## 2024-09-10 DIAGNOSIS — I10 ESSENTIAL HYPERTENSION: ICD-10-CM

## 2024-09-10 DIAGNOSIS — R00.2 PALPITATIONS: ICD-10-CM

## 2024-09-10 DIAGNOSIS — I26.99 PE (PULMONARY THROMBOEMBOLISM) (HCC): Primary | ICD-10-CM

## 2024-09-10 DIAGNOSIS — R60.0 PERIPHERAL EDEMA: ICD-10-CM

## 2024-09-10 DIAGNOSIS — R53.83 OTHER FATIGUE: ICD-10-CM

## 2024-09-10 DIAGNOSIS — G47.33 OSA (OBSTRUCTIVE SLEEP APNEA): ICD-10-CM

## 2024-09-10 PROCEDURE — 99204 OFFICE O/P NEW MOD 45 MIN: CPT | Performed by: INTERNAL MEDICINE

## 2024-09-10 RX ORDER — MULTIVITAMIN
1 TABLET ORAL DAILY
COMMUNITY

## 2024-09-10 RX ORDER — METOPROLOL SUCCINATE 50 MG/1
50 TABLET, EXTENDED RELEASE ORAL DAILY
COMMUNITY
End: 2024-09-17 | Stop reason: SDUPTHER

## 2024-09-10 RX ORDER — VALSARTAN 160 MG/1
160 TABLET ORAL DAILY
COMMUNITY

## 2024-09-11 NOTE — PROGRESS NOTES
Consultation - Cardiology   Bro Roldan 48 y.o. male MRN: [id number]  Unit/Bed#:  Encounter: [id number]  Physician Requesting Consult: Abdiel Acharya DO  Reason for Consult / Principal Problem: cardiac evaluation    Assessment:  1. PE (pulmonary thromboembolism) (HCC)        2. Essential hypertension  Ambulatory Referral to Cardiology      3. Biatrial enlargement  Ambulatory Referral to Cardiology      4. LVH (left ventricular hypertrophy)  Ambulatory Referral to Cardiology      5. Aortic root dilatation (HCC)  Ambulatory Referral to Cardiology      6. Palpitations  Ambulatory Referral to Cardiology      7. NEELY (dyspnea on exertion)  Ambulatory Referral to Cardiology      8. Other fatigue  Ambulatory Referral to Cardiology      9. JORGE (obstructive sleep apnea)  Ambulatory Referral to Cardiology      10. Lightheadedness  Ambulatory Referral to Cardiology      11. Peripheral edema                        Plan:  He is stable from a cardiac standpoint and SOB is better. He denies CP.   ECHO from 8/7/2024 is stable. BP is well controlled.  He needs to continue to recover from his PE.  I have reviewed his medications and made no changes. No cardiac testing is ordered.  RTO 6 months.      History of Present Illness     HPI: Bro Roldan is a 48 y.o. year old male who denies any past cardiac history. He was referred to see cardiology on 8/7/2024 for HTN, abnl echo, palpitations, NEELY.  On 8/8/2024, he was seen in the ER for SOB and found to have saddle PE with extensive b/l pulmonary arterial filling defects.   LE venous doppler study showed b/l DVT    His SOB has improved since then but since persists. He denies CP. BP is well controlled - 114/84.    He remains on Xarelto. He follows with hematology.    He has JORGE and is on CPAP.  He denies smoking.    .    ECHO 4/17/2024 - EF 55%, LVH, mildly dilated LA and RA, aortic root 4.6 cm, ascending aorta 3.8 cm.      ECHO 8/8/2024 - Normal EF. LVH. Mildly dilated RV with  "low normal function. Mildly dilated RA    His weight is down from 355 to 292 lbs over the past several months.                  Review of Systems:    Alert awake oriented, comfortable, denies any complaints  No fevers chills nausea vomiting  No weakness, dizziness, seizures  positive for - shortness of breath  Denies any palpitations, chest pain, diaphoresis  Denies leg edema, pain or paresthesias  Denies any skin rashes  Denies abdominal pain, bloody stools, masses  Denies any depression or suicidal ideations      Historical Information   Past Medical History:   Diagnosis Date    Allergic     Anxiety     Arthritis     Asthma     Depression     GERD (gastroesophageal reflux disease)     Hypertension     Inflammatory bowel disease     Obesity     Visual impairment      Past Surgical History:   Procedure Laterality Date    EYE SURGERY      2021/2022    KNEE SURGERY  2006     Social History     Substance and Sexual Activity   Alcohol Use Not Currently    Comment: occasional     Social History     Substance and Sexual Activity   Drug Use Yes    Types: Marijuana    Comment: Medical Marijuana     Social History     Tobacco Use   Smoking Status Former    Passive exposure: Never   Smokeless Tobacco Never     Family History: Family history non-contributory    Meds/Allergies   all current active meds have been reviewed  Allergies   Allergen Reactions    Cat Hair Extract     Pollen Extract        Objective   Vitals: Blood pressure 114/84, pulse 67, height 6' 4\" (1.93 m), weight 133 kg (292 lb 6.4 oz), SpO2 99%., Body mass index is 35.59 kg/m².,   Weight (last 2 days)       Date/Time Weight    09/10/24 1341 133 (292.4)                      Physical Exam:  GEN: Bro Roldan appears well, alert and oriented x 3, pleasant and cooperative   HEENT: pupils equal, round, and reactive to light; extraocular muscles intact  NECK: supple, no carotid bruits   HEART: regular rhythm, normal S1 and S2, no murmurs, clicks, gallops or rubs "   LUNGS: clear to auscultation bilaterally; no wheezes, rales, or rhonchi   ABDOMEN: normal bowel sounds, soft, no tenderness, no distention  EXTREMITIES: peripheral pulses normal; no clubbing, cyanosis, or edema  NEURO: no focal findings   SKIN: normal without suspicious lesions on exposed skin    Lab Results:   No visits with results within 1 Day(s) from this visit.   Latest known visit with results is:   Appointment on 08/26/2024   Component Date Value Ref Range Status    BOLIVAR SYNDROME DNA ANALYSIS 08/26/2024 Not Detected   Final    Pathogenic variant not detected.    HEREDITARY BREAST & OVARIAN CANCER* 08/26/2024 Not Detected   Final    Pathogenic variant not detected.    FAMILIAL HYPERCHOLESTEROLEMIA DNA * 08/26/2024 Not Detected   Final    Comment: Pathogenic variant not detected.  Genes Tested: BRCA1, BRCA2, MLH1, MSH2, MSH6, PMS2, EPCAM, APOB, LDLR, LDLRAP1, PCSK9  Test Description: Helix Tier One Population Screen is a screening test that analyzes 11 genes related to hereditary breast and ovarian cancer (HBOC) syndrome, Bolivar syndrome, and familial hypercholesterolemia. This test only reports clinically significant pathogenic and likely pathogenic variants, unlike diagnostic testing, which also reports variants of uncertain significance (VUS). In addition, analysis of the PMS2 gene excludes exons 11-15, which overlap with a known pseudogene (PMS2CL).  Disclaimer: This test was developed and validated by [facility] This test has not been cleared or approved by the [place] Food and Drug Administration (FDA). The [email or web address] laboratory is accredited by the College of American Pathologists (CAP) and certified under the Clinical Laboratory Improvement Amendments (CLIA #: 42Y2032393) to perform high-complexity clinical tests. This                            test is used for clinical purposes. It should not be regarded as investigational or for research.  Methods and Limitations: Extracted DNA is enriched for  targeted regions and then sequenced using the Helix Exome+ (R) assay on an Illumina DNA sequencing system. Data is then aligned to a modified version of GRCh38 and all genes are analyzed using the DILIA transcript and DILIA Plus Clinical transcript, when available. Small variant calling is completed using a customized version of [employer] software, augmented by a proprietary small variant caller for difficult variants. Copy number variants (CNVs) are then called using a proprietary bioinformatics pipeline based on depth analysis with a comparison to similarly sequenced samples. Analysis of the PMS2 gene is limited to exons 1-10. The interpretation and reporting of variants in APOB, PCSK9, and LDLR is specific to familial hypercholesterolemia; variants associated with hypobetalipoproteinemia are not included. Interpretation is                            based upon guidelines published by the American College of Medical Genetics and Genomics (ACMG) and the Association for Molecular Pathology (AMP) or their modification by [employer] Variant Curation Expert Panels when available. Interpretation is limited to the transcripts indicated on the report and +/- 10 bp into intronic regions, except as noted below. Helix variant classifications include pathogenic, likely pathogenic, variant of uncertain significance (VUS), likely benign, and benign. Only variants classified as pathogenic and likely pathogenic are included in the report. All reported variants are confirmed through secondary manual inspection of DNA sequence data or orthogonal testing. Risk estimations and management guidelines included in this report are based on analysis of primary literature and recommendations of applicable professional societies, and should be regarded as approximations.Based on validation studies, this assay delivers > 99% sensitivity and specificity for single                            nucleotide variants and insertions and deletions  (indels) up to 20 bp. Larger indels and complex variants are also reported but sensitivity may be reduced. Based on validation studies, this assay delivers > 99% sensitivity to multi-exon CNVs and > 90% sensitivity to single-exon CNVs. This test may not detect variants in challenging regions (such as short tandem repeats, homopolymer runs, and segment duplications), sub-exonic CNVs, chromosomal aneuploidy, or variants in the presence of mosaicism. Phasing will be attempted and reported, when possible. Structural rearrangements such as inversions, translocations, and gene conversions are not tested in this assay unless explicitly indicated. Additionally, deep intronic, promoter, and enhancer regions may not be covered. It is important to note that this is a screening test and cannot detect all disease-causing variants. A negative result does not guarantee the absence of a rare, undetectable variant in the genes analyzed; consider using a                            diagnostic test if there is significant personal and/or family history of one of the conditions analyzed by this test. Any potential incidental findings outside of these genes and conditions will not be identified, nor reported. The results of a genetic test may be influenced by various factors, including bone marrow transplantation, blood transfusions, or in rare cases, hematolymphoid neoplasms.Gene Specific Notes:BRCA1: sequencing analysis extends to CDS +/-20 bp; BRCA2: sequencing analysis extends to CDS +/-20 bp. EPCAM: analysis is limited to CNV of exons 8-9; MLH1: analysis includes CNV of the promoter; PMS2: analysis is limited to exons 1-10.  Sequencing Location: Sequencing done at [facility] [address] [place] [address], [address], [place] [address] ([facility]# [id number])  Designation: Callum Mendoza, PhD, [facility]  Email: [email or web address]

## 2024-09-17 DIAGNOSIS — I10 ESSENTIAL HYPERTENSION: Primary | ICD-10-CM

## 2024-09-17 RX ORDER — METOPROLOL SUCCINATE 50 MG/1
50 TABLET, EXTENDED RELEASE ORAL DAILY
Qty: 90 TABLET | Refills: 1 | Status: SHIPPED | OUTPATIENT
Start: 2024-09-17

## 2024-09-17 NOTE — TELEPHONE ENCOUNTER
Previously prescribed while inpatient-patient stated the medication refill should be sent to PCP     Reason for call:   [x] Refill   [] Prior Auth  [] Other:     Office:   [x] PCP/Provider - [facility]  -Roberto Blackman,    [] Specialty/Provider -     Medication: metoprolol succinate (TOPROL-XL) 50 mg 24 hr tablet     Take 50 mg by mouth daily     Pharmacy: [facility] [address]     Does the patient have enough for 3 days?   [] Yes   [x] No - Send as HP to POD

## 2024-09-20 ENCOUNTER — TELEPHONE (OUTPATIENT)
Age: 49
End: 2024-09-20

## 2024-09-20 DIAGNOSIS — E66.812 CLASS 2 OBESITY DUE TO EXCESS CALORIES WITH BODY MASS INDEX (BMI) OF 39.0 TO 39.9 IN ADULT, UNSPECIFIED WHETHER SERIOUS COMORBIDITY PRESENT: Primary | ICD-10-CM

## 2024-09-20 DIAGNOSIS — E66.09 CLASS 2 OBESITY DUE TO EXCESS CALORIES WITH BODY MASS INDEX (BMI) OF 39.0 TO 39.9 IN ADULT, UNSPECIFIED WHETHER SERIOUS COMORBIDITY PRESENT: Primary | ICD-10-CM

## 2024-09-20 NOTE — TELEPHONE ENCOUNTER
Patient needs procedure prior authorization for Factor 5 Leiden (order number [id number]).    Please advise and notify.

## 2024-09-20 NOTE — TELEPHONE ENCOUNTER
Patient is requesting a upgrade of his Wegovy to 1.0 mg.  He said he previously got it from weight management.    Please advise and notify.

## 2024-09-25 ENCOUNTER — HOSPITAL ENCOUNTER (OUTPATIENT)
Dept: CT IMAGING | Facility: HOSPITAL | Age: 49
Discharge: HOME | End: 2024-09-25
Attending: INTERNAL MEDICINE
Payer: COMMERCIAL

## 2024-09-25 ENCOUNTER — APPOINTMENT (OUTPATIENT)
Dept: LAB | Facility: HOSPITAL | Age: 49
End: 2024-09-25
Payer: COMMERCIAL

## 2024-09-25 ENCOUNTER — CONSULT (OUTPATIENT)
Dept: HEMATOLOGY ONCOLOGY | Facility: CLINIC | Age: 49
End: 2024-09-25
Payer: COMMERCIAL

## 2024-09-25 VITALS
HEIGHT: 76 IN | WEIGHT: 289 LBS | TEMPERATURE: 97.7 F | SYSTOLIC BLOOD PRESSURE: 124 MMHG | HEART RATE: 67 BPM | OXYGEN SATURATION: 98 % | DIASTOLIC BLOOD PRESSURE: 80 MMHG | RESPIRATION RATE: 18 BRPM | BODY MASS INDEX: 35.19 KG/M2

## 2024-09-25 DIAGNOSIS — I26.99 PE (PULMONARY THROMBOEMBOLISM) (HCC): ICD-10-CM

## 2024-09-25 DIAGNOSIS — D75.1 ERYTHROCYTOSIS: Primary | ICD-10-CM

## 2024-09-25 LAB
ALBUMIN SERPL BCG-MCNC: 4.1 G/DL (ref 3.5–5)
ALP SERPL-CCNC: 32 U/L (ref 34–104)
ALT SERPL W P-5'-P-CCNC: 20 U/L (ref 7–52)
ANION GAP SERPL CALCULATED.3IONS-SCNC: 6 MMOL/L (ref 4–13)
AST SERPL W P-5'-P-CCNC: 16 U/L (ref 13–39)
BASOPHILS # BLD AUTO: 0.02 THOUSANDS/ΜL (ref 0–0.1)
BASOPHILS NFR BLD AUTO: 0 % (ref 0–1)
BILIRUB SERPL-MCNC: 0.72 MG/DL (ref 0.2–1)
BUN SERPL-MCNC: 11 MG/DL (ref 5–25)
CALCIUM SERPL-MCNC: 9.3 MG/DL (ref 8.4–10.2)
CHLORIDE SERPL-SCNC: 102 MMOL/L (ref 96–108)
CO2 SERPL-SCNC: 28 MMOL/L (ref 21–32)
CREAT SERPL-MCNC: 0.89 MG/DL (ref 0.6–1.3)
EOSINOPHIL # BLD AUTO: 0.06 THOUSAND/ΜL (ref 0–0.61)
EOSINOPHIL NFR BLD AUTO: 1 % (ref 0–6)
ERYTHROCYTE [DISTWIDTH] IN BLOOD BY AUTOMATED COUNT: 13.1 % (ref 11.6–15.1)
GFR SERPL CREATININE-BSD FRML MDRD: 101 ML/MIN/1.73SQ M
GLUCOSE SERPL-MCNC: 85 MG/DL (ref 65–140)
HCT VFR BLD AUTO: 42.5 % (ref 36.5–49.3)
HGB BLD-MCNC: 14.1 G/DL (ref 12–17)
IMM GRANULOCYTES # BLD AUTO: 0.01 THOUSAND/UL (ref 0–0.2)
IMM GRANULOCYTES NFR BLD AUTO: 0 % (ref 0–2)
LYMPHOCYTES # BLD AUTO: 2.78 THOUSANDS/ΜL (ref 0.6–4.47)
LYMPHOCYTES NFR BLD AUTO: 55 % (ref 14–44)
MCH RBC QN AUTO: 31.3 PG (ref 26.8–34.3)
MCHC RBC AUTO-ENTMCNC: 33.2 G/DL (ref 31.4–37.4)
MCV RBC AUTO: 94 FL (ref 82–98)
MONOCYTES # BLD AUTO: 0.48 THOUSAND/ΜL (ref 0.17–1.22)
MONOCYTES NFR BLD AUTO: 9 % (ref 4–12)
NEUTROPHILS # BLD AUTO: 1.77 THOUSANDS/ΜL (ref 1.85–7.62)
NEUTS SEG NFR BLD AUTO: 35 % (ref 43–75)
NRBC BLD AUTO-RTO: 0 /100 WBCS
PLATELET # BLD AUTO: 248 THOUSANDS/UL (ref 149–390)
PMV BLD AUTO: 9.7 FL (ref 8.9–12.7)
POTASSIUM SERPL-SCNC: 4 MMOL/L (ref 3.5–5.3)
PROT SERPL-MCNC: 6.7 G/DL (ref 6.4–8.4)
RBC # BLD AUTO: 4.5 MILLION/UL (ref 3.88–5.62)
SODIUM SERPL-SCNC: 136 MMOL/L (ref 135–147)
WBC # BLD AUTO: 5.12 THOUSAND/UL (ref 4.31–10.16)

## 2024-09-25 PROCEDURE — 80053 COMPREHEN METABOLIC PANEL: CPT

## 2024-09-25 PROCEDURE — 85025 COMPLETE CBC W/AUTO DIFF WBC: CPT

## 2024-09-25 PROCEDURE — 99204 OFFICE O/P NEW MOD 45 MIN: CPT | Performed by: INTERNAL MEDICINE

## 2024-09-25 PROCEDURE — 86146 BETA-2 GLYCOPROTEIN ANTIBODY: CPT

## 2024-09-25 PROCEDURE — 36415 COLL VENOUS BLD VENIPUNCTURE: CPT

## 2024-09-25 PROCEDURE — 71275 CT ANGIOGRAPHY CHEST: CPT

## 2024-09-25 RX ADMIN — IOHEXOL 100 ML: 350 INJECTION, SOLUTION INTRAVENOUS at 11:08

## 2024-09-25 NOTE — PROGRESS NOTES
Hematology/Oncology Outpatient consult  Bro Roldan 48 y.o. male 1975 [id number]    Date:  9/25/2024    Assessment and Plan:  1. PE (pulmonary thromboembolism) (HCC)  Unprovoked saddle embolus with extensive bilateral pulmonary emboli diagnosed around 8/8/2024.  I had a lengthy and extensive discussion about the potential etiology of his hypercoagulability.  His extensive clotting event seems to be unprovoked and for that reason indefinite anticoagulation is the usual recommendation unless there is absolute contraindication like active bleeding.  The patient seems to have multiple risk factors including male sex, sedentary lifestyle, obesity and unprovoked hypercoagulability which makes indefinite anticoagulation the safer recommendation for this nice gentleman.  He was told that we will pursue limited hypercoagulable workup to rule out antiphospholipid antibody which is less likely since the anticardiolipin antibodies were within normal range.  We did discuss briefly the prevalence of factor V Leiden mutation which usually does not influence our decision regarding the length of anticoagulation.    During the physical examination he did complain about dull pain in the mid anterior chest wall close to the sternum of unknown etiology.  I did offer him CTA of the chest which will be done today to better understand the etiology of his persistent pain.  He also stated he has chronic lumbar pain which should be investigated at 1 point in the future with a lumbar spine MRI.  This can be discussed with his PCP at the next visit.  We did discuss the potential benefit of exercising on regular basis and healthy dieting as well.      Addendum:  The patient had CTA of the chest stat to better understand the etiology of his right chest dull pain.  The CTA showed:  IMPRESSION:  Nonocclusive thrombus in the right lower lobar pulmonary artery, decreased in size since August.  Otherwise no pulmonary embolism identified.  No  acute pulmonary pathology, within the limitations of underinflated lungs.    The patient was instructed to continue with the Eliquis without any changes.    - CTA chest pe study; Future  - CBC and differential; Future  - Comprehensive metabolic panel; Future  - Beta-2 glycoprotein antibodies; Future  - JAK2 V617F rfx CALR/MPL/E12-15; Future    2. Erythrocytosis  Mild erythrocytosis was noted.  We will check for JAK2 mutation for completeness sake to rule out any obvious hint of myeloproliferative disorder which can occasionally cause hypercoagulability.  - JAK2 V617F rfx CALR/MPL/E12-15; Future      HPI:  This is a 48-year-old male with history of arthritis, asthma, GERD, obesity, etc.  He was found to have evidence of chronic, nonocclusive superficial thrombophlebitis of the left lower extremity at the ankle up to the distal thigh around April 2024.  He stated that he had significant swelling of the lower extremities of unknown etiology around that time.  More recently, the patient had sudden onset of headaches and severe shortness of breath on minimal activity on 8/7/2024.  Initial workup by the PCP showed high D-dimer.  He was then asked to go to the emergency room for further evaluation where he had a CTA of the chest, abdomen and pelvis on 8/8/2024 which showed:  IMPRESSION:  1. Saddle embolus and extensive bilateral pulmonary arterial filling defects consistent with PE. RV/LV ratio remains within normal limits.  2. No acute findings throughout the abdomen and pelvis.  3. 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 Ultrasound initially recommended for further assessment.    Doppler study of the lower extremities at the same day showed evidence of acute nonocclusive deep vein thrombosis in both lower extremities in different veins.  The patient was admitted to the hospital for IV heparin drip which was then later on transition to oral Eliquis.  He is currently taking Eliquis 5 mg twice a day which he is tolerating  relatively well.  He had limited hypercoagulable workup which showed normal Antithrombin level and protein C&S activities.  Factor V Leiden genetic testing was also done.  The result is still pending.        Interval history:  The patient complained today about dull pain in the anterior chest wall on the right side close to the sternum.  He denied obvious bleeding from any sites.  Blood work on 8/9/2024 showed hemoglobin of 15.5 with hematocrit of 47.5.  White cells and platelets were within normal range.  ROS: Review of Systems   Constitutional:  Positive for appetite change and fatigue. Negative for chills and fever.   HENT:  Negative for ear pain and sore throat.    Eyes:  Negative for pain and visual disturbance.   Respiratory:  Positive for shortness of breath. Negative for cough.    Cardiovascular:  Negative for chest pain and palpitations.   Gastrointestinal:  Positive for constipation. Negative for abdominal pain and vomiting.   Genitourinary:  Negative for dysuria and hematuria.   Musculoskeletal:  Positive for arthralgias. Negative for back pain.   Skin:  Negative for color change and rash.   Neurological:  Positive for dizziness, numbness and headaches. Negative for seizures and syncope.   Psychiatric/Behavioral:  Positive for sleep disturbance.    All other systems reviewed and are negative.      Past Medical History:   Diagnosis Date    Allergic     Anxiety     Arthritis     Asthma     Depression     GERD (gastroesophageal reflux disease)     Hypertension     Inflammatory bowel disease     Obesity     Visual impairment        Past Surgical History:   Procedure Laterality Date    EYE SURGERY      2021/2022    KNEE SURGERY  2006       Social History     Socioeconomic History    Marital status: Single     Spouse name: None    Number of children: None    Years of education: None    Highest education level: None   Occupational History    None   Tobacco Use    Smoking status: Former     Passive exposure:  Never    Smokeless tobacco: Never   Vaping Use    Vaping status: Never Used   Substance and Sexual Activity    Alcohol use: Not Currently     Comment: occasional    Drug use: Yes     Types: Marijuana     Comment: Medical Marijuana    Sexual activity: Not Currently     Partners: Female   Other Topics Concern    None   Social History Narrative    None     Social Determinants of Health     Financial Resource Strain: Not on file   Food Insecurity: No Food Insecurity (8/9/2024)    Hunger Vital Sign     Worried About Running Out of Food in the Last Year: Never true     Ran Out of Food in the Last Year: Never true   Transportation Needs: No Transportation Needs (8/9/2024)    PRAPARE - Transportation     Lack of Transportation (Medical): No     Lack of Transportation (Non-Medical): No   Physical Activity: Not on file   Stress: Not on file   Social Connections: Unknown (6/18/2024)    Received from "[employer]    Social [employer]     How often do you feel lonely or isolated from those around you? (Adult - for ages 18 years and over): Not on file   Intimate Partner Violence: Not on file   Housing Stability: Low Risk  (8/9/2024)    Housing Stability Vital Sign     Unable to Pay for Housing in the Last Year: No     Number of Times Moved in the Last Year: 0     Homeless in the Last Year: No       Family History   Problem Relation Age of Onset    Diabetes Maternal Grandmother     Diabetes Maternal Grandfather     Cancer Paternal Grandfather        Allergies   Allergen Reactions    Cat Hair Extract     Pollen Extract          Current Outpatient Medications:     albuterol (ProAir HFA) 90 mcg/act inhaler, Inhale 1 puff every 4 (four) hours as needed for wheezing or shortness of breath, Disp: 20.1 g, Rfl: 3    amphetamine-dextroamphetamine (ADDERALL) 20 mg tablet, take 1 tablet by mouth twice a day (Patient taking differently: Take 10 mg by mouth 3 (three) times a day), Disp: 30 tablet, Rfl: 0    apixaban (Eliquis) 5 mg, Take 1 tablet  (5 mg total) by mouth 2 (two) times a day Take 10 mg twice daily for 7 days (through 8/15) then on 8/16 start taking 5 mg twice daily., Disp: 120 tablet, Rfl: 0    cetirizine (ZyrTEC) 10 mg tablet, Take 10 mg by mouth every morning, Disp: , Rfl:     diltiazem (CARDIZEM CD) 180 mg 24 hr capsule, Take 1 capsule (180 mg total) by mouth daily, Disp: 100 capsule, Rfl: 3    dorzolamide (TRUSOPT) 2 % ophthalmic solution, Administer 1 drop into the left eye 2 (two) times a day, Disp: , Rfl:     famotidine (PEPCID) 40 MG tablet, Take 1 tablet (40 mg total) by mouth every morning (Patient taking differently: Take 40 mg by mouth every evening), Disp: 90 tablet, Rfl: 3    fluticasone (FLONASE) 50 mcg/act nasal spray, 1 spray into each nostril daily, Disp: 16 g, Rfl: 0    guaiFENesin (Mucinex) 600 mg 12 hr tablet, Take 1 tablet (600 mg total) by mouth every 12 (twelve) hours, Disp: 20 tablet, Rfl: 0    guaifenesin-codeine (GUAIFENESIN AC) 100-10 MG/5ML liquid, Take 5 mL by mouth 3 (three) times a day as needed for cough, Disp: 120 mL, Rfl: 0    hydrOXYzine HCL (ATARAX) 25 mg tablet, Take 1 tablet (25 mg total) by mouth 3 (three) times a day as needed for anxiety, Disp: 90 tablet, Rfl: 3    metoprolol succinate (TOPROL-XL) 50 mg 24 hr tablet, Take 1 tablet (50 mg total) by mouth daily, Disp: 90 tablet, Rfl: 1    Multiple Vitamin (multivitamin) tablet, Take 1 tablet by mouth daily, Disp: , Rfl:     Omega-3 Fatty Acids (fish oil) 1,000 mg, Take 2 capsules (2,000 mg total) by mouth daily, Disp: 60 capsule, Rfl: 11    omeprazole (PriLOSEC) 40 MG capsule, Take 1 capsule (40 mg total) by mouth daily (Patient taking differently: Take 40 mg by mouth every evening), Disp: 90 capsule, Rfl: 3    Rosuvastatin Calcium 20 MG CPSP, Take 20 mg by mouth in the morning, Disp: 90 capsule, Rfl: 30    Semaglutide-Weight Management (WEGOVY) 1 MG/0.5ML, Inject 0.5 mL (1 mg total) under the skin once a week for 28 days, Disp: 2 mL, Rfl: 0    valsartan  "(DIOVAN) 160 mg tablet, Take 160 mg by mouth daily, Disp: , Rfl:     zolpidem (AMBIEN) 5 mg tablet, Take 1 tablet (5 mg total) by mouth daily at bedtime as needed for sleep, Disp: 30 tablet, Rfl: 0    naproxen (NAPROSYN) 500 mg tablet, Take 1 tablet (500 mg total) by mouth 2 (two) times a day as needed for moderate pain, Disp: 180 tablet, Rfl: 0      Physical Exam:  /80 (BP Location: Right arm, Patient Position: Sitting, Cuff Size: Adult)   Pulse 67   Temp 97.7 °F (36.5 °C)   Resp 18   Ht 6' 4\" (1.93 m)   Wt 131 kg (289 lb)   SpO2 98%   BMI 35.18 kg/m²     Physical Exam  Constitutional:       Appearance: He is well-developed. He is obese.   HENT:      Head: Normocephalic and atraumatic.      Nose: Nose normal.   Eyes:      General: No scleral icterus.        Right eye: No discharge.         Left eye: No discharge.      Conjunctiva/sclera: Conjunctivae normal.      Pupils: Pupils are equal, round, and reactive to light.   Neck:      Thyroid: No thyromegaly.      Trachea: No tracheal deviation.   Cardiovascular:      Rate and Rhythm: Normal rate and regular rhythm.      Heart sounds: Normal heart sounds. No murmur heard.     No friction rub.   Pulmonary:      Effort: Pulmonary effort is normal. No respiratory distress.      Breath sounds: Normal breath sounds. No wheezing or rales.   Chest:      Chest wall: No tenderness.   Abdominal:      General: There is no distension.      Palpations: Abdomen is soft. There is no hepatomegaly or splenomegaly.      Tenderness: There is no abdominal tenderness. There is no guarding or rebound.      Comments: Obese abdomen   Musculoskeletal:         General: No tenderness or deformity. Normal range of motion.      Cervical back: Normal range of motion and neck supple.   Lymphadenopathy:      Cervical: No cervical adenopathy.   Skin:     General: Skin is warm and dry.      Coloration: Skin is not pale.      Findings: No erythema or rash.   Neurological:      Mental " Status: He is alert and oriented to person, place, and time.      Cranial Nerves: No cranial nerve deficit.      Coordination: Coordination normal.      Deep Tendon Reflexes: Reflexes are normal and symmetric.   Psychiatric:         Behavior: Behavior normal.         Thought Content: Thought content normal.         Judgment: Judgment normal.           Labs:  Lab Results   Component Value Date    WBC 6.13 08/09/2024    HGB 15.5 08/09/2024    HCT 47.5 08/09/2024    MCV 95 08/09/2024     08/09/2024     Lab Results   Component Value Date    K 4.6 08/08/2024     08/08/2024    CO2 27 08/08/2024    BUN 14 08/08/2024    CREATININE 0.93 08/08/2024    GLUF 106 (H) 05/03/2024    CALCIUM 9.8 08/08/2024    AST 17 08/07/2024    ALT 21 08/07/2024    ALKPHOS 39 08/07/2024    EGFR 96 08/08/2024       Patient voiced understanding and agreement in the above discussion. Aware to contact our office with questions/symptoms in the interim.

## 2024-09-27 LAB
B2 GLYCOPROT1 IGA SERPL IA-ACNC: 1.4
B2 GLYCOPROT1 IGG SERPL IA-ACNC: <0.8
B2 GLYCOPROT1 IGM SERPL IA-ACNC: <2.4

## 2024-09-30 DIAGNOSIS — J30.1 SEASONAL ALLERGIC RHINITIS DUE TO POLLEN: Primary | ICD-10-CM

## 2024-09-30 DIAGNOSIS — I26.99 PE (PULMONARY THROMBOEMBOLISM) (HCC): ICD-10-CM

## 2024-09-30 RX ORDER — CETIRIZINE HYDROCHLORIDE 10 MG/1
10 TABLET ORAL EVERY MORNING
Qty: 90 TABLET | Refills: 1 | Status: SHIPPED | OUTPATIENT
Start: 2024-09-30

## 2024-09-30 NOTE — TELEPHONE ENCOUNTER
Reason for call:   [x] Refill   [] Prior Auth  [x] Other: apixaban (Eliquis) 5 mg - lasted prescribed by [place] [facility] [place] [facility] - LIANNA Stuart     Office:   [x] PCP/Provider - [facility] -  Historical Provider, MD   [] Specialty/Provider -     Medication: apixaban (Eliquis) 5 mg     Dose/Frequency: Take 1 tablet (5 mg total) by mouth 2 (two) times a day Take 10 mg twice daily for 7 days (through 8/15) then on 8/16 start taking 5 mg twice daily., Starting Fri 8/9/2024     Quantity:  120 tablet     Medication:  cetirizine (ZyrTEC) 10 mg tablet    Dose/Frequency: Take 10 mg by mouth every morning     Quantity: not listed    Pharmacy: [facility] - JASMINA GODINEZ [address] [phone]    Does the patient have enough for 3 days?   [] Yes   [x] No - Send as HP to POD

## 2024-09-30 NOTE — PROGRESS NOTES
Gonsalo Blakely,    It is my pleasure to see you today at the [facility].    My recommendations for this visit are:    Change metoprolol titrate 25 mg twice a day to metoprolol succinate 50 mg daily  Start Losartan 25 mg daily for better blood pressure control and CHF  ECHO to reevaluate heart function  Will schedule you to have CT NING and then decide if you can stop Warfarin  CMP and lipid profile are added to previous blood sample  You will start cardiac rehab  Will see you again in 6 months      Lorna Samaniego MD, PhD   PT Evaluation     Today's date: 2023  Patient name: Sky Kirby  : 1975  MRN: [id number]  Referring provider: Renetta Mei*  Dx: No diagnosis found  Assessment/Plan    Subjective Evaluation    History of Present Illness  Date of onset: 2022  Mechanism of injury: Pt reported back injury in , and his L sided sciatica has been flared up down his leg  Last few months it has been getting worse from sleeping on the couch  Pt reported his home exercises he does at home haven't been helping to much  He is currently taking naproxen and is very mentally focused on his pain  Denies hx of previous surgery on back, and hasn't had imaging since that injury  Pt does report N/T in his toes and his L calf  Pt does deny saddle anesthesia and bowel/bladder changes related to his back  Goals: no pain, flying, pain free ADL's  Pain  No pain reported  Current pain ratin  At best pain ratin  At worst pain ratin  Quality: dull ache, radiating, discomfort and burning          Objective     Tests     Lumbar     Left   Positive passive SLR       Posture:     Palpation:       Sensation Left Right   Kinesthesia     Light Touch     Sharp/Dull     2 Point Discrimination         Lumbar AROM Left Right   Flexion WNL    Extension WNL, * LBP with radicular sxs into L LE     Lateral Flexion  Mildly limited, * L LBP with radicular sxs into L LE Mildly limited   Rotation     * = LBP    Spine mobility: hypomobile lower t-spine     DTR Left Right   Quad     Achilles       Hip screen:     Special tests:     Manual Muscle Testing - Hip Left Right   Flexion 5 5   Extension     Abduction     External Rotation       Manual Muscle Testing - Knee Left Right   Flexion 5 5   Extension 5, *L LBP and L foot n/t  5     Manual Muscle Testing - Ankle Left Right   Doriflexion     Plantarflexion     EHL         Gait Assessment:    Functional Assessment:   Stairs  Squats            Precautions: none  Re-eval Date: 3/17/23    Date 2/17       Visit Count        FOTO        Pain In        Pain Out                    Manuals        STM targetting b/l erectors, Grade 3-4 PA's lower lumbar spine, L sided UPA's, passive sciatic nerve glide performed                               Neuro Re-Ed                                                                 Ther Ex        Cat/camel        LTR        Bridges                                                Ther Activity                        Gait Training                        Modalities Re-Ed                                                                 Ther Ex        Cat/camel        LTR        Bridges                                                Ther Activity                        Gait Training                        Modalities

## 2024-10-01 ENCOUNTER — APPOINTMENT (OUTPATIENT)
Dept: LAB | Facility: HOSPITAL | Age: 49
End: 2024-10-01
Attending: INTERNAL MEDICINE
Payer: COMMERCIAL

## 2024-10-01 ENCOUNTER — CLINICAL SUPPORT (OUTPATIENT)
Dept: BARIATRICS | Facility: CLINIC | Age: 49
End: 2024-10-01

## 2024-10-01 VITALS
HEIGHT: 76 IN | WEIGHT: 285 LBS | HEART RATE: 78 BPM | OXYGEN SATURATION: 98 % | RESPIRATION RATE: 16 BRPM | DIASTOLIC BLOOD PRESSURE: 78 MMHG | BODY MASS INDEX: 34.7 KG/M2 | TEMPERATURE: 97.8 F | SYSTOLIC BLOOD PRESSURE: 118 MMHG

## 2024-10-01 DIAGNOSIS — D75.1 ERYTHROCYTOSIS: ICD-10-CM

## 2024-10-01 DIAGNOSIS — R63.5 ABNORMAL WEIGHT GAIN: Primary | ICD-10-CM

## 2024-10-01 DIAGNOSIS — I26.99 PE (PULMONARY THROMBOEMBOLISM) (HCC): ICD-10-CM

## 2024-10-01 PROCEDURE — 36415 COLL VENOUS BLD VENIPUNCTURE: CPT

## 2024-10-01 PROCEDURE — RECHECK

## 2024-10-01 NOTE — PROGRESS NOTES
Patient last visit weight: 302lb  Patient current visit weight: 285lb      If you are taking an injectable medication,  are you experiencing any of the following symptoms:  Bloating: No  Nausea: No  Vomiting: No  Constipation: No   Diarrhea: No  SPECIFY INJECTABLE MEDICATION AND CURRENT DOSAGE: Wegovy 1 mg      Vitals:    Is BP less than 100/60? No  Is BP greater than 140/90? No  Is HR greater than 100? No  **If yes to any of the above, have patient relax and repeat in 5-10 minutes**

## 2024-10-02 LAB — MTHFR GENE MUT ANL BLD/T: NORMAL

## 2024-10-07 ENCOUNTER — TELEPHONE (OUTPATIENT)
Dept: SURGERY | Facility: CLINIC | Age: 49
End: 2024-10-07

## 2024-10-07 NOTE — TELEPHONE ENCOUNTER
----- Message from Chasidy Garza PA-C sent at 10/1/2024  5:05 PM EDT -----  Regarding: RE: Nurse visit  His PCP has been refilling his Wegovy. Advise to stick with one provider. Would he like them to continue to manage this?  ----- Message -----  From: Maegan Melgar LPN  Sent: 10/1/2024   9:40 AM EDT  To: Chasidy Garza PA-C  Subject: Nurse visit                                      Patient was here for a nurse visit, doing well on Wegovy 1 mg, needs refill sent to pharmacy on file.

## 2024-10-07 NOTE — TELEPHONE ENCOUNTER
Patient returning call, unable to get Maegan on the line, please follow up with patient at [phone]

## 2024-10-08 ENCOUNTER — OFFICE VISIT (OUTPATIENT)
Dept: OBGYN CLINIC | Facility: CLINIC | Age: 49
End: 2024-10-08
Payer: COMMERCIAL

## 2024-10-08 VITALS
WEIGHT: 285 LBS | HEART RATE: 69 BPM | HEIGHT: 76 IN | DIASTOLIC BLOOD PRESSURE: 75 MMHG | SYSTOLIC BLOOD PRESSURE: 116 MMHG | BODY MASS INDEX: 34.7 KG/M2

## 2024-10-08 DIAGNOSIS — M17.0 PRIMARY OSTEOARTHRITIS OF BOTH KNEES: Primary | ICD-10-CM

## 2024-10-08 DIAGNOSIS — M17.12 PRIMARY OSTEOARTHRITIS OF LEFT KNEE: ICD-10-CM

## 2024-10-08 LAB — SCAN RESULT: NORMAL

## 2024-10-08 PROCEDURE — 20610 DRAIN/INJ JOINT/BURSA W/O US: CPT | Performed by: ORTHOPAEDIC SURGERY

## 2024-10-08 PROCEDURE — 99213 OFFICE O/P EST LOW 20 MIN: CPT | Performed by: ORTHOPAEDIC SURGERY

## 2024-10-08 RX ORDER — BUPIVACAINE HYDROCHLORIDE 2.5 MG/ML
4 INJECTION, SOLUTION INFILTRATION; PERINEURAL
Status: COMPLETED | OUTPATIENT
Start: 2024-10-08 | End: 2024-10-08

## 2024-10-08 RX ORDER — TRIAMCINOLONE ACETONIDE 40 MG/ML
80 INJECTION, SUSPENSION INTRA-ARTICULAR; INTRAMUSCULAR
Status: COMPLETED | OUTPATIENT
Start: 2024-10-08 | End: 2024-10-08

## 2024-10-08 RX ADMIN — TRIAMCINOLONE ACETONIDE 80 MG: 40 INJECTION, SUSPENSION INTRA-ARTICULAR; INTRAMUSCULAR at 13:00

## 2024-10-08 RX ADMIN — BUPIVACAINE HYDROCHLORIDE 4 ML: 2.5 INJECTION, SOLUTION INFILTRATION; PERINEURAL at 13:00

## 2024-10-08 NOTE — PROGRESS NOTES
ASSESSMENT/PLAN:    Diagnoses and all orders for this visit:    Primary osteoarthritis of both knees    Primary osteoarthritis of left knee    Other orders  -     Large joint arthrocentesis        The patient was seen and examined.  He is relatively asymptomatic with regards to his right knee.  Possible treatment options were discussed with his left knee.  His left knee was injected with Kenalog and Marcaine.  He tolerated the injection quite well.  He will follow-up with our office in 3 months.  He is acceptable to this plan.    No follow-ups on file.    The patient has symptomatic osteoarthritis of his left knee.  Under aseptic technique, the left knee was reinjected with Kenalog and Marcaine.  He tolerated procedure quite well.  Return back in 3 months for reevaluation      _____________________________________________________  CHIEF COMPLAINT:  Chief Complaint   Patient presents with    Left Knee - Follow-up    Right Knee - Follow-up         SUBJECTIVE:  Bro Roldan is a 48 y.o. male who presents to our office for a follow-up visit.  The patient has a history of primary osteoarthritis of both knees.  Last visit, he was given a corticosteroid injection.  He states that his left knee is still giving him pain, however, his right knee is relatively asymptomatic.  He denies any numbness or tingling.  He denies any fever or chills.    The following portions of the patient's history were reviewed and updated as appropriate: allergies, current medications, past family history, past medical history, past social history, past surgical history and problem list.    PAST MEDICAL HISTORY:  Past Medical History:   Diagnosis Date    Allergic     Anxiety     Arthritis     Asthma     Depression     GERD (gastroesophageal reflux disease)     Hypertension     Inflammatory bowel disease     Obesity     Visual impairment        PAST SURGICAL HISTORY:  Past Surgical History:   Procedure Laterality Date    EYE SURGERY      2021/2022     KNEE SURGERY  2006       FAMILY HISTORY:  Family History   Problem Relation Age of Onset    Diabetes Maternal Grandmother     Diabetes Maternal Grandfather     Cancer Paternal Grandfather        SOCIAL HISTORY:  Social History     Tobacco Use    Smoking status: Former     Passive exposure: Never    Smokeless tobacco: Never   Vaping Use    Vaping status: Never Used   Substance Use Topics    Alcohol use: Not Currently     Comment: occasional    Drug use: Yes     Types: Marijuana     Comment: Medical Marijuana       MEDICATIONS:    Current Outpatient Medications:     albuterol (ProAir HFA) 90 mcg/act inhaler, Inhale 1 puff every 4 (four) hours as needed for wheezing or shortness of breath, Disp: 20.1 g, Rfl: 3    amphetamine-dextroamphetamine (ADDERALL) 20 mg tablet, take 1 tablet by mouth twice a day (Patient taking differently: Take 10 mg by mouth 3 (three) times a day), Disp: 30 tablet, Rfl: 0    apixaban (Eliquis) 5 mg, Take 1 tablet (5 mg total) by mouth 2 (two) times a day Take 10 mg twice daily for 7 days (through 8/15) then on 8/16 start taking 5 mg twice daily., Disp: 120 tablet, Rfl: 0    cetirizine (ZyrTEC) 10 mg tablet, Take 1 tablet (10 mg total) by mouth every morning, Disp: 90 tablet, Rfl: 1    diltiazem (CARDIZEM CD) 180 mg 24 hr capsule, Take 1 capsule (180 mg total) by mouth daily, Disp: 100 capsule, Rfl: 3    dorzolamide (TRUSOPT) 2 % ophthalmic solution, Administer 1 drop into the left eye 2 (two) times a day, Disp: , Rfl:     famotidine (PEPCID) 40 MG tablet, Take 1 tablet (40 mg total) by mouth every morning (Patient taking differently: Take 40 mg by mouth every evening), Disp: 90 tablet, Rfl: 3    fluticasone (FLONASE) 50 mcg/act nasal spray, 1 spray into each nostril daily, Disp: 16 g, Rfl: 0    guaiFENesin (Mucinex) 600 mg 12 hr tablet, Take 1 tablet (600 mg total) by mouth every 12 (twelve) hours, Disp: 20 tablet, Rfl: 0    guaifenesin-codeine (GUAIFENESIN AC) 100-10 MG/5ML liquid, Take 5  mL by mouth 3 (three) times a day as needed for cough, Disp: 120 mL, Rfl: 0    hydrOXYzine HCL (ATARAX) 25 mg tablet, Take 1 tablet (25 mg total) by mouth 3 (three) times a day as needed for anxiety, Disp: 90 tablet, Rfl: 3    metoprolol succinate (TOPROL-XL) 50 mg 24 hr tablet, Take 1 tablet (50 mg total) by mouth daily, Disp: 90 tablet, Rfl: 1    Multiple Vitamin (multivitamin) tablet, Take 1 tablet by mouth daily, Disp: , Rfl:     Omega-3 Fatty Acids (fish oil) 1,000 mg, Take 2 capsules (2,000 mg total) by mouth daily, Disp: 60 capsule, Rfl: 11    omeprazole (PriLOSEC) 40 MG capsule, Take 1 capsule (40 mg total) by mouth daily (Patient taking differently: Take 40 mg by mouth every evening), Disp: 90 capsule, Rfl: 3    Rosuvastatin Calcium 20 MG CPSP, Take 20 mg by mouth in the morning, Disp: 90 capsule, Rfl: 30    Semaglutide-Weight Management (WEGOVY) 1 MG/0.5ML, Inject 0.5 mL (1 mg total) under the skin once a week for 28 days, Disp: 2 mL, Rfl: 0    valsartan (DIOVAN) 160 mg tablet, Take 160 mg by mouth daily, Disp: , Rfl:     zolpidem (AMBIEN) 5 mg tablet, Take 1 tablet (5 mg total) by mouth daily at bedtime as needed for sleep, Disp: 30 tablet, Rfl: 0    naproxen (NAPROSYN) 500 mg tablet, Take 1 tablet (500 mg total) by mouth 2 (two) times a day as needed for moderate pain, Disp: 180 tablet, Rfl: 0    ALLERGIES:  Allergies   Allergen Reactions    Cat Hair Extract     Pollen Extract        ROS:  Review of Systems     Constitutional: Negative for fatigue, fever or loss of appetite.   HENT: Negative.    Respiratory: Negative for shortness of breath, dyspnea.    Cardiovascular: Negative for chest pain/tightness.   Gastrointestinal: Negative for abdominal pain, N/V.   Endocrine: Negative for cold/heat intolerance, unexplained weight loss/gain.   Genitourinary: Negative for flank pain, dysuria, hematuria.   Musculoskeletal: Positive for arthralgia   Skin: Negative for rash.    Neurological: Negative for numbness or  "tingling  Psychiatric/Behavioral: Negative for agitation.  _____________________________________________________  PHYSICAL EXAMINATION:    Blood pressure 116/75, pulse 69, height 6' 4\" (1.93 m), weight 129 kg (285 lb).    Constitutional: Oriented to person, place, and time. Appears well-developed and well-nourished. No distress.   HENT:   Head: Normocephalic.   Eyes: Conjunctivae are normal. Right eye exhibits no discharge. Left eye exhibits no discharge. No scleral icterus.   Cardiovascular: Normal rate.    Pulmonary/Chest: Effort normal.   Neurological: Alert and oriented to person, place, and time.   Skin: Skin is warm and dry. No rash noted. Not diaphoretic. No erythema. No pallor.   Psychiatric: Normal mood and affect. Behavior is normal. Judgment and thought content normal.      MUSCULOSKELETAL EXAMINATION:   Physical Exam  Ortho Exam    Bilateral lower extremities are neurovascularly intact  Toes are pink and mobile   Compartments are soft  No warmth, erythema or ecchymosis  ROM of knees are from 5-115 degrees  Negative Lachman, drawer or pivot shift  No medial instability  Medial joint line tenderness, slight lateral joint line tenderness  Patellofemoral crepitation   Objective:  BP Readings from Last 1 Encounters:   10/08/24 116/75      Wt Readings from Last 1 Encounters:   10/08/24 129 kg (285 lb)        BMI:   Estimated body mass index is 34.69 kg/m² as calculated from the following:    Height as of this encounter: 6' 4\" (1.93 m).    Weight as of this encounter: 129 kg (285 lb).      PROCEDURES PERFORMED:  Large joint arthrocentesis: L knee  Universal Protocol:  Risks and benefits: risks, benefits and alternatives were discussed  Consent given by: patient  Patient understanding: patient states understanding of the procedure being performed  Site marked: the operative site was marked  Patient identity confirmed: verbally with patient  Supporting Documentation  Indications: pain   Procedure " Details  Location: knee - L knee  Preparation: Patient was prepped and draped in the usual sterile fashion  Needle size: 22 G  Ultrasound guidance: no  Approach: lateral  Medications administered: 4 mL bupivacaine 0.25 %; 80 mg triamcinolone acetonide 40 mg/mL    Patient tolerance: patient tolerated the procedure well with no immediate complications  Dressing:  Sterile dressing applied            Scribe Attestation      I,:  Neal Hedrick PA-C am acting as a scribe while in the presence of the attending physician.:       I,:  Abdiel Herbert DO personally performed the services described in this documentation    as scribed in my presence.:

## 2024-10-14 DIAGNOSIS — E66.09 CLASS 2 OBESITY DUE TO EXCESS CALORIES WITH BODY MASS INDEX (BMI) OF 39.0 TO 39.9 IN ADULT, UNSPECIFIED WHETHER SERIOUS COMORBIDITY PRESENT: ICD-10-CM

## 2024-10-14 DIAGNOSIS — I10 ESSENTIAL HYPERTENSION: Primary | ICD-10-CM

## 2024-10-14 DIAGNOSIS — E66.812 CLASS 2 OBESITY DUE TO EXCESS CALORIES WITH BODY MASS INDEX (BMI) OF 39.0 TO 39.9 IN ADULT, UNSPECIFIED WHETHER SERIOUS COMORBIDITY PRESENT: ICD-10-CM

## 2024-10-14 RX ORDER — SEMAGLUTIDE 1.7 MG/.75ML
INJECTION, SOLUTION SUBCUTANEOUS
Qty: 3 ML | Refills: 0 | Status: SHIPPED | OUTPATIENT
Start: 2024-10-14

## 2024-10-14 RX ORDER — VALSARTAN 160 MG/1
160 TABLET ORAL DAILY
Qty: 90 TABLET | Refills: 1 | Status: SHIPPED | OUTPATIENT
Start: 2024-10-14

## 2024-10-14 NOTE — TELEPHONE ENCOUNTER
Reason for call:   [x] Refill   [] Prior Auth  [] Other:     Office:   [x] PCP/Provider - Dr Blackman  [] Specialty/Provider -     Medication: valsartan     Dose/Frequency: 160 mg take one daily     Quantity: 90    Pharmacy: [facility]     Does the patient have enough for 3 days?   [] Yes   [x] No - Send as HP to POD- he took his last one this past Saturday

## 2024-10-15 NOTE — TELEPHONE ENCOUNTER
Patient returning Maegan call and stated that he will be seeing his PCP on 10/25 and will discuss with him and then give us a call back if the PCP will keep refilling medication or if he will continue to come to weight management

## 2024-10-15 NOTE — TELEPHONE ENCOUNTER
Placed call to patient and left a message to return our call.     See message below if patient calls back.

## 2024-10-24 ENCOUNTER — TELEPHONE (OUTPATIENT)
Age: 49
End: 2024-10-24

## 2024-10-24 NOTE — TELEPHONE ENCOUNTER
Fausto called to advise, when he originally set up CPAP for patient accidentally set to incorrect parameters. He is fixing settings now.

## 2024-10-25 ENCOUNTER — OFFICE VISIT (OUTPATIENT)
Dept: INTERNAL MEDICINE CLINIC | Facility: CLINIC | Age: 49
End: 2024-10-25
Payer: COMMERCIAL

## 2024-10-25 VITALS
WEIGHT: 280.4 LBS | HEIGHT: 76 IN | BODY MASS INDEX: 34.15 KG/M2 | HEART RATE: 84 BPM | DIASTOLIC BLOOD PRESSURE: 84 MMHG | TEMPERATURE: 97.6 F | SYSTOLIC BLOOD PRESSURE: 124 MMHG | OXYGEN SATURATION: 98 %

## 2024-10-25 DIAGNOSIS — Z00.00 HEALTHCARE MAINTENANCE: ICD-10-CM

## 2024-10-25 DIAGNOSIS — K21.9 GASTROESOPHAGEAL REFLUX DISEASE WITHOUT ESOPHAGITIS: ICD-10-CM

## 2024-10-25 DIAGNOSIS — Z23 ENCOUNTER FOR IMMUNIZATION: Primary | ICD-10-CM

## 2024-10-25 DIAGNOSIS — F90.0 ATTENTION DEFICIT HYPERACTIVITY DISORDER (ADHD), PREDOMINANTLY INATTENTIVE TYPE: ICD-10-CM

## 2024-10-25 DIAGNOSIS — M54.42 ACUTE LEFT-SIDED LOW BACK PAIN WITH LEFT-SIDED SCIATICA: ICD-10-CM

## 2024-10-25 DIAGNOSIS — I99.8 ACUTE LOWER EXTREMITY ISCHEMIA: ICD-10-CM

## 2024-10-25 DIAGNOSIS — G47.00 INSOMNIA, UNSPECIFIED TYPE: ICD-10-CM

## 2024-10-25 PROCEDURE — G0439 PPPS, SUBSEQ VISIT: HCPCS | Performed by: INTERNAL MEDICINE

## 2024-10-25 PROCEDURE — 99214 OFFICE O/P EST MOD 30 MIN: CPT | Performed by: INTERNAL MEDICINE

## 2024-10-25 RX ORDER — ZOLPIDEM TARTRATE 10 MG/1
10 TABLET ORAL
Qty: 30 TABLET | Refills: 0 | Status: SHIPPED | OUTPATIENT
Start: 2024-10-25 | End: 2024-11-24

## 2024-10-25 RX ORDER — DORZOLAMIDE HYDROCHLORIDE AND TIMOLOL MALEATE 20; 5 MG/ML; MG/ML
1 SOLUTION/ DROPS OPHTHALMIC 2 TIMES DAILY
COMMUNITY
Start: 2024-08-21

## 2024-10-25 RX ORDER — OMEPRAZOLE 40 MG/1
40 CAPSULE, DELAYED RELEASE ORAL EVERY EVENING
Qty: 90 CAPSULE | Refills: 3 | Status: SHIPPED | OUTPATIENT
Start: 2024-10-25 | End: 2025-10-20

## 2024-10-25 RX ORDER — FAMOTIDINE 40 MG/1
40 TABLET, FILM COATED ORAL EVERY EVENING
Qty: 90 TABLET | Refills: 3 | Status: SHIPPED | OUTPATIENT
Start: 2024-10-25 | End: 2025-10-20

## 2024-10-25 RX ORDER — DEXTROAMPHETAMINE SACCHARATE, AMPHETAMINE ASPARTATE, DEXTROAMPHETAMINE SULFATE AND AMPHETAMINE SULFATE 5; 5; 5; 5 MG/1; MG/1; MG/1; MG/1
10 TABLET ORAL 3 TIMES DAILY
Qty: 90 TABLET | Refills: 0 | Status: SHIPPED | OUTPATIENT
Start: 2024-10-25

## 2024-10-25 NOTE — ASSESSMENT & PLAN NOTE
Combination of Pepcid and Omeprazole at bed time  Orders:    famotidine (PEPCID) 40 MG tablet; Take 1 tablet (40 mg total) by mouth every evening    omeprazole (PriLOSEC) 40 MG capsule; Take 1 capsule (40 mg total) by mouth every evening

## 2024-10-25 NOTE — PROGRESS NOTES
Adult Annual Physical  Name: Bro Roldan      : 1975      MRN: [id number]  Encounter Provider: Roberto Blackman DO  Encounter Date: 10/25/2024   Encounter department: [facility]    Assessment & Plan  Healthcare maintenance         Acute left-sided low back pain with left-sided sciatica  Noted chronic left sided pain that radiates down his spine       Acute lower extremity ischemia  Dusky appearance of the LLE at times most notably with sitting.    Arterial doppler  Orders:    VAS ARTERIAL DUPLEX-LOWER LIMB UNILATERAL; Future    XR spine lumbar minimum 4 views non injury; Future    Attention deficit hyperactivity disorder (ADHD), predominantly inattentive type  The patient is taking  Adderal 10 mg spread TID a day with the last dose at 1500 Hours  Orders:    amphetamine-dextroamphetamine (ADDERALL) 20 mg tablet; Take 0.5 tablets (10 mg total) by mouth 3 (three) times a day Max Daily Amount: 30 mg    Insomnia, unspecified type  Now using the Ambien 10 mg QHS  Orders:    zolpidem (AMBIEN) 10 mg tablet; Take 1 tablet (10 mg total) by mouth daily at bedtime as needed for sleep Patient has old pill bottle and it has 10mg 1 at bedtime for sleep    Encounter for immunization  The patient was seen and examined and is interested in the flus shot  Orders:    influenza vaccine preservative-free 0.5 mL IM (Fluzone, Afluria, Fluarix, Flulaval)    Gastroesophageal reflux disease without esophagitis  Combination of Pepcid and Omeprazole at bed time  Orders:    famotidine (PEPCID) 40 MG tablet; Take 1 tablet (40 mg total) by mouth every evening    omeprazole (PriLOSEC) 40 MG capsule; Take 1 capsule (40 mg total) by mouth every evening    Immunizations and preventive care screenings were discussed with patient today. Appropriate education was printed on patient's after visit summary.    Discussed risks and benefits of prostate cancer screening. We discussed the controversial history of PSA screening for  prostate cancer in the [place] as well as the risk of over detection and over treatment of prostate cancer by way of PSA screening.  The patient understands that PSA blood testing is an imperfect way to screen for prostate cancer and that elevated PSA levels in the blood may also be caused by infection, inflammation, prostatic trauma or manipulation, urological procedures, or by benign prostatic enlargement.    The role of the digital rectal examination in prostate cancer screening was also discussed and I discussed with him that there is large interobserver variability in the findings of digital rectal examination.    Counseling:  Alcohol/drug use: discussed moderation in alcohol intake, the recommendations for healthy alcohol use, and avoidance of illicit drug use.  Dental Health: discussed importance of regular tooth brushing, flossing, and dental visits.  Injury prevention: discussed safety/seat belts, safety helmets, smoke detectors, carbon monoxide detectors, and smoking near bedding or upholstery.  Sexual health: discussed sexually transmitted diseases, partner selection, use of condoms, avoidance of unintended pregnancy, and contraceptive alternatives.  Exercise: the importance of regular exercise/physical activity was discussed. Recommend exercise 3-5 times per week for at least 30 minutes.          History of Present Illness     Adult Annual Physical:  Patient presents for annual physical.     Diet and Physical Activity:  - Diet/Nutrition: well balanced diet.  - Exercise: walking.    Depression Screening:  - PHQ-2 Score: 2    General Health:  - Sleep: 4-6 hours of sleep on average.  - Hearing: normal hearing bilateral ears.  - Vision: wears glasses and goes for regular eye exams.  - Dental: regular dental visits.     Health:    - Urinary symptoms: none.     Advanced Care Planning:  - Has an advanced directive?: no    - Has a durable medical POA?: no    - ACP document given to patient?: yes       Review of Systems   Constitutional:  Negative for chills, fatigue and fever.   HENT: Negative.     Respiratory:  Negative for cough, chest tightness and shortness of breath.    Cardiovascular:  Negative for chest pain and palpitations.   Gastrointestinal:  Negative for abdominal pain, constipation, diarrhea, nausea and vomiting.   Genitourinary: Negative.    Musculoskeletal:  Negative for back pain and myalgias.   Skin: Negative.    Neurological: Negative.    Psychiatric/Behavioral:  Negative for dysphoric mood. The patient is not nervous/anxious.      Current Outpatient Medications on File Prior to Visit   Medication Sig Dispense Refill    albuterol (ProAir HFA) 90 mcg/act inhaler Inhale 1 puff every 4 (four) hours as needed for wheezing or shortness of breath 20.1 g 3    apixaban (Eliquis) 5 mg Take 1 tablet (5 mg total) by mouth 2 (two) times a day Take 10 mg twice daily for 7 days (through 8/15) then on 8/16 start taking 5 mg twice daily. 120 tablet 0    cetirizine (ZyrTEC) 10 mg tablet Take 1 tablet (10 mg total) by mouth every morning 90 tablet 1    diltiazem (CARDIZEM CD) 180 mg 24 hr capsule Take 1 capsule (180 mg total) by mouth daily 100 capsule 3    dorzolamide-timolol (COSOPT) 2-0.5 % ophthalmic solution Administer 1 drop to both eyes 2 (two) times a day      fluticasone (FLONASE) 50 mcg/act nasal spray 1 spray into each nostril daily 16 g 0    hydrOXYzine HCL (ATARAX) 25 mg tablet Take 1 tablet (25 mg total) by mouth 3 (three) times a day as needed for anxiety 90 tablet 3    metoprolol succinate (TOPROL-XL) 50 mg 24 hr tablet Take 1 tablet (50 mg total) by mouth daily 90 tablet 1    Multiple Vitamin (multivitamin) tablet Take 1 tablet by mouth daily      naproxen (NAPROSYN) 500 mg tablet Take 1 tablet (500 mg total) by mouth 2 (two) times a day as needed for moderate pain 180 tablet 0    Omega-3 Fatty Acids (fish oil) 1,000 mg Take 2 capsules (2,000 mg total) by mouth daily 60 capsule 11     "Rosuvastatin Calcium 20 MG CPSP Take 20 mg by mouth in the morning 90 capsule 30    Semaglutide-Weight Management (Wegovy) 1.7 MG/0.75ML Inject 1.7 mg under the skin weekly 3 mL 0    valsartan (DIOVAN) 160 mg tablet Take 1 tablet (160 mg total) by mouth daily 90 tablet 1    [DISCONTINUED] amphetamine-dextroamphetamine (ADDERALL) 20 mg tablet take 1 tablet by mouth twice a day (Patient taking differently: Take 10 mg by mouth 3 (three) times a day) 30 tablet 0    [DISCONTINUED] famotidine (PEPCID) 40 MG tablet Take 1 tablet (40 mg total) by mouth every morning (Patient taking differently: Take 40 mg by mouth every evening) 90 tablet 3    [DISCONTINUED] omeprazole (PriLOSEC) 40 MG capsule Take 1 capsule (40 mg total) by mouth daily (Patient taking differently: Take 40 mg by mouth every evening) 90 capsule 3    [DISCONTINUED] zolpidem (AMBIEN) 5 mg tablet Take 1 tablet (5 mg total) by mouth daily at bedtime as needed for sleep (Patient taking differently: Take 10 mg by mouth daily at bedtime as needed for sleep Patient has old pill bottle and it has 10mg 1 at bedtime for sleep) 30 tablet 0    [DISCONTINUED] dorzolamide (TRUSOPT) 2 % ophthalmic solution Administer 1 drop into the left eye 2 (two) times a day (Patient not taking: Reported on 10/25/2024)      [DISCONTINUED] guaiFENesin (Mucinex) 600 mg 12 hr tablet Take 1 tablet (600 mg total) by mouth every 12 (twelve) hours (Patient not taking: Reported on 10/25/2024) 20 tablet 0    [DISCONTINUED] guaifenesin-codeine (GUAIFENESIN AC) 100-10 MG/5ML liquid Take 5 mL by mouth 3 (three) times a day as needed for cough (Patient not taking: Reported on 10/25/2024) 120 mL 0     No current facility-administered medications on file prior to visit.        Objective     /84   Pulse 84   Temp 97.6 °F (36.4 °C) (Tympanic)   Ht 6' 4\" (1.93 m)   Wt 127 kg (280 lb 6.4 oz)   SpO2 98%   BMI 34.13 kg/m²     Physical Exam  Vitals and nursing note reviewed.   Constitutional:      "  General: He is not in acute distress.     Appearance: He is well-developed.   HENT:      Head: Normocephalic and atraumatic.   Eyes:      Conjunctiva/sclera: Conjunctivae normal.   Cardiovascular:      Rate and Rhythm: Normal rate and regular rhythm.      Heart sounds: No murmur heard.  Pulmonary:      Effort: Pulmonary effort is normal. No respiratory distress.      Breath sounds: Normal breath sounds.   Abdominal:      Palpations: Abdomen is soft.      Tenderness: There is no abdominal tenderness.   Musculoskeletal:         General: No swelling.      Cervical back: Neck supple.   Skin:     General: Skin is warm and dry.      Capillary Refill: Capillary refill takes less than 2 seconds.   Neurological:      Mental Status: He is alert.   Psychiatric:         Mood and Affect: Mood normal.

## 2024-10-25 NOTE — ASSESSMENT & PLAN NOTE
The patient is taking  Adderal 10 mg spread TID a day with the last dose at 1500 Hours  Orders:    amphetamine-dextroamphetamine (ADDERALL) 20 mg tablet; Take 0.5 tablets (10 mg total) by mouth 3 (three) times a day Max Daily Amount: 30 mg

## 2024-11-11 ENCOUNTER — HOSPITAL ENCOUNTER (OUTPATIENT)
Dept: NON INVASIVE DIAGNOSTICS | Facility: HOSPITAL | Age: 49
Discharge: HOME | End: 2024-11-11
Payer: COMMERCIAL

## 2024-11-11 ENCOUNTER — APPOINTMENT (OUTPATIENT)
Dept: RADIOLOGY | Facility: CLINIC | Age: 49
End: 2024-11-11
Payer: COMMERCIAL

## 2024-11-11 DIAGNOSIS — I99.8 ACUTE LOWER EXTREMITY ISCHEMIA: ICD-10-CM

## 2024-11-11 PROCEDURE — 93926 LOWER EXTREMITY STUDY: CPT | Performed by: SURGERY

## 2024-11-11 PROCEDURE — 93926 LOWER EXTREMITY STUDY: CPT

## 2024-11-11 PROCEDURE — 93922 UPR/L XTREMITY ART 2 LEVELS: CPT | Performed by: SURGERY

## 2024-11-11 PROCEDURE — 72110 X-RAY EXAM L-2 SPINE 4/>VWS: CPT

## 2024-11-12 ENCOUNTER — TELEPHONE (OUTPATIENT)
Dept: INTERNAL MEDICINE CLINIC | Facility: CLINIC | Age: 49
End: 2024-11-12

## 2024-11-12 DIAGNOSIS — E66.01 SEVERE OBESITY (HCC): Primary | ICD-10-CM

## 2024-11-12 RX ORDER — SEMAGLUTIDE 2.4 MG/.75ML
INJECTION, SOLUTION SUBCUTANEOUS
Qty: 3 ML | Refills: 0 | Status: SHIPPED | OUTPATIENT
Start: 2024-11-12

## 2024-11-12 NOTE — TELEPHONE ENCOUNTER
Patient called the RX Refill Line. Message is being forwarded to the office.     Patient is requesting next dose of Wegovy be sent to [facility] took last injection 11/7/24    Please contact patient at [phone]

## 2024-11-12 NOTE — TELEPHONE ENCOUNTER
----- Message from Roberto Blackman DO sent at 11/12/2024  7:44 AM EST -----  Normal arterial dopplers and no changes at this time

## 2024-11-14 ENCOUNTER — RESULTS FOLLOW-UP (OUTPATIENT)
Dept: INTERNAL MEDICINE CLINIC | Facility: CLINIC | Age: 49
End: 2024-11-14

## 2024-11-20 DIAGNOSIS — J40 SINOBRONCHITIS: ICD-10-CM

## 2024-11-20 DIAGNOSIS — J32.9 SINOBRONCHITIS: ICD-10-CM

## 2024-11-21 ENCOUNTER — TELEPHONE (OUTPATIENT)
Age: 49
End: 2024-11-21

## 2024-11-21 RX ORDER — FLUTICASONE PROPIONATE 50 MCG
1 SPRAY, SUSPENSION (ML) NASAL DAILY
Qty: 16 G | Refills: 2 | Status: SHIPPED | OUTPATIENT
Start: 2024-11-21

## 2024-11-21 NOTE — TELEPHONE ENCOUNTER
Patient is asking if appointment on 12/2 is still necessary or if Dr. Blackman is okay with just rescheduling for his annual. Patient requests call back to advise.

## 2024-11-22 NOTE — TELEPHONE ENCOUNTER
Patient returned call, advised Dr. Blackman recommends keeping appointment. He states he needs to reschedule then, rescheduled for next available 1/10 @ 8.

## 2024-11-22 NOTE — TELEPHONE ENCOUNTER
LVM for patient to call the office, advised that he keep the appointment for a follow up instead of seeing him annually to make sure that he is doing OK on his meds.

## 2024-11-29 DIAGNOSIS — E78.00 HYPERCHOLESTEROLEMIA: ICD-10-CM

## 2024-12-02 ENCOUNTER — TELEPHONE (OUTPATIENT)
Age: 49
End: 2024-12-02

## 2024-12-02 NOTE — TELEPHONE ENCOUNTER
PA for Rosuvastatin Calcium 20 MG CPSP SUBMITTED to [employer]    via    []CMM-KEY:   []Surescripts-Case ID #   []Availity-Auth ID # NDC #   []Faxed to plan   [x]Other website [employer] - ID [id number]  []Phone call Case ID #     [x]PA sent as URGENT    All office notes, labs and other pertaining documents and studies sent. Clinical questions answered. Awaiting determination from insurance company.     Turnaround time for your insurance to make a decision on your Prior Authorization can take 7-21 business days.

## 2024-12-05 ENCOUNTER — OFFICE VISIT (OUTPATIENT)
Dept: HEMATOLOGY ONCOLOGY | Facility: CLINIC | Age: 49
End: 2024-12-05
Payer: COMMERCIAL

## 2024-12-05 ENCOUNTER — TELEPHONE (OUTPATIENT)
Dept: INTERNAL MEDICINE CLINIC | Facility: CLINIC | Age: 49
End: 2024-12-05

## 2024-12-05 VITALS
DIASTOLIC BLOOD PRESSURE: 76 MMHG | BODY MASS INDEX: 32.51 KG/M2 | RESPIRATION RATE: 18 BRPM | HEIGHT: 76 IN | TEMPERATURE: 97.2 F | SYSTOLIC BLOOD PRESSURE: 118 MMHG | OXYGEN SATURATION: 99 % | WEIGHT: 267 LBS | HEART RATE: 80 BPM

## 2024-12-05 DIAGNOSIS — I26.99 PE (PULMONARY THROMBOEMBOLISM) (HCC): Primary | ICD-10-CM

## 2024-12-05 DIAGNOSIS — D75.1 ERYTHROCYTOSIS: ICD-10-CM

## 2024-12-05 DIAGNOSIS — I26.99 PE (PULMONARY THROMBOEMBOLISM) (HCC): ICD-10-CM

## 2024-12-05 PROCEDURE — 99213 OFFICE O/P EST LOW 20 MIN: CPT | Performed by: INTERNAL MEDICINE

## 2024-12-05 NOTE — PROGRESS NOTES
Hematology/Oncology Outpatient Follow-up  Bro Roldan 49 y.o. male 1975 [id number]    Date:  12/5/2024    Assessment and Plan:  1. PE (pulmonary thromboembolism) (HCC) (Primary)  Unprovoked saddle embolus with extensive bilateral pulmonary emboli diagnosed around 8/8/2024.  I had a lengthy and extensive discussion about the potential etiology of his hypercoagulability.  His extensive clotting event seems to be unprovoked and for that reason indefinite anticoagulation is the usual recommendation unless there is absolute contraindication like active bleeding.    I did discuss with him the result of the limited hypercoagulable workup which was negative for any obvious hint of antiphospholipid antibody syndrome.  He was asked to continue with the Eliquis 5 mg twice a day indefinitely unless there is active bleeding.  Will see him on as-needed basis.    2. Erythrocytosis  Recent CBC was negative for erythrocytosis.  JAK2 analysis came back negative.        HPI:  This is a 48-year-old male with history of arthritis, asthma, GERD, obesity, etc.  He was found to have evidence of chronic, nonocclusive superficial thrombophlebitis of the left lower extremity at the ankle up to the distal thigh around April 2024.  He stated that he had significant swelling of the lower extremities of unknown etiology around that time.  More recently, the patient had sudden onset of headaches and severe shortness of breath on minimal activity on 8/7/2024.  Initial workup by the PCP showed high D-dimer.  He was then asked to go to the emergency room for further evaluation where he had a CTA of the chest, abdomen and pelvis on 8/8/2024 which showed:  IMPRESSION:  1. Saddle embolus and extensive bilateral pulmonary arterial filling defects consistent with PE. RV/LV ratio remains within normal limits.  2. No acute findings throughout the abdomen and pelvis.  3. 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 Ultrasound initially recommended for  further assessment.     Doppler study of the lower extremities at the same day showed evidence of acute nonocclusive deep vein thrombosis in both lower extremities in different veins.  The patient was admitted to the hospital for IV heparin drip which was then later on transition to oral Eliquis.  He is currently taking Eliquis 5 mg twice a day which he is tolerating relatively well.  He had limited hypercoagulable workup which showed normal Antithrombin level and protein C&S activities.  Factor V Leiden genetic testing was also done.  The result is still pending.          Interval history:  The patient came today for a follow-up visit.  He denied any pain whatsoever.  He continues to take the Eliquis 5 mg twice a day which he is tolerating relatively well without any obvious vinay bleeding from any sites.  He had blood work on 9/25/2024 which showed normal CBC.  Hemoglobin was 14.1.  Creatinine 0.8 with normal calcium and liver enzymes.  Beta-2 glycoprotein antibodies were within normal range.        ROS: Review of Systems   Constitutional:  Negative for chills and fever.   HENT:  Negative for ear pain and sore throat.    Eyes:  Negative for pain and visual disturbance.   Respiratory:  Negative for cough and shortness of breath.    Cardiovascular:  Negative for chest pain and palpitations.   Gastrointestinal:  Negative for abdominal pain and vomiting.   Genitourinary:  Negative for dysuria and hematuria.   Musculoskeletal:  Negative for arthralgias and back pain.   Skin:  Negative for color change and rash.   Neurological:  Negative for seizures and syncope.   All other systems reviewed and are negative.      Past Medical History:   Diagnosis Date    Allergic     Anxiety     Arthritis     Asthma     Depression     GERD (gastroesophageal reflux disease)     Hypertension     Inflammatory bowel disease     Obesity     Visual impairment        Past Surgical History:   Procedure Laterality Date    EYE SURGERY       2021/2022    KNEE SURGERY  2006       Social History     Socioeconomic History    Marital status: Single     Spouse name: None    Number of children: None    Years of education: None    Highest education level: None   Occupational History    None   Tobacco Use    Smoking status: Former     Passive exposure: Never    Smokeless tobacco: Never   Vaping Use    Vaping status: Never Used   Substance and Sexual Activity    Alcohol use: Not Currently     Comment: occasional    Drug use: Yes     Types: Marijuana     Comment: Medical Marijuana    Sexual activity: Not Currently     Partners: Female   Other Topics Concern    None   Social History Narrative    None     Social Drivers of Health     Financial Resource Strain: Not on file   Food Insecurity: No Food Insecurity (8/9/2024)    Nursing - Inadequate Food Risk Classification     Worried About Running Out of Food in the Last Year: Never true     Ran Out of Food in the Last Year: Never true     Ran Out of Food in the Last Year: Not on file   Transportation Needs: No Transportation Needs (8/9/2024)    PRAPARE - Transportation     Lack of Transportation (Medical): No     Lack of Transportation (Non-Medical): No   Physical Activity: Not on file   Stress: Not on file   Social Connections: Unknown (6/18/2024)    Received from [facility]    Social [employer]     How often do you feel lonely or isolated from those around you? (Adult - for ages 18 years and over): Not on file   Intimate Partner Violence: Not on file   Housing Stability: Low Risk  (8/9/2024)    Housing Stability Vital Sign     Unable to Pay for Housing in the Last Year: No     Number of Times Moved in the Last Year: 0     Homeless in the Last Year: No       Family History   Problem Relation Age of Onset    Diabetes Maternal Grandmother     Diabetes Maternal Grandfather     Cancer Paternal Grandfather        Allergies   Allergen Reactions    Cat Hair Extract     Pollen Extract          Current Outpatient Medications:      albuterol (ProAir HFA) 90 mcg/act inhaler, Inhale 1 puff every 4 (four) hours as needed for wheezing or shortness of breath, Disp: 20.1 g, Rfl: 3    amphetamine-dextroamphetamine (ADDERALL) 20 mg tablet, Take 0.5 tablets (10 mg total) by mouth 3 (three) times a day Max Daily Amount: 30 mg, Disp: 90 tablet, Rfl: 0    cetirizine (ZyrTEC) 10 mg tablet, Take 1 tablet (10 mg total) by mouth every morning, Disp: 90 tablet, Rfl: 1    diltiazem (CARDIZEM CD) 180 mg 24 hr capsule, Take 1 capsule (180 mg total) by mouth daily, Disp: 100 capsule, Rfl: 3    dorzolamide-timolol (COSOPT) 2-0.5 % ophthalmic solution, Administer 1 drop to both eyes 2 (two) times a day, Disp: , Rfl:     famotidine (PEPCID) 40 MG tablet, Take 1 tablet (40 mg total) by mouth every evening, Disp: 90 tablet, Rfl: 3    fluticasone (FLONASE) 50 mcg/act nasal spray, 1 SPRAY INTO EACH NOSTRIL DAILY, Disp: 16 g, Rfl: 2    hydrOXYzine HCL (ATARAX) 25 mg tablet, Take 1 tablet (25 mg total) by mouth 3 (three) times a day as needed for anxiety, Disp: 90 tablet, Rfl: 3    metoprolol succinate (TOPROL-XL) 50 mg 24 hr tablet, Take 1 tablet (50 mg total) by mouth daily, Disp: 90 tablet, Rfl: 1    Multiple Vitamin (multivitamin) tablet, Take 1 tablet by mouth daily, Disp: , Rfl:     Omega-3 Fatty Acids (fish oil) 1,000 mg, Take 2 capsules (2,000 mg total) by mouth daily, Disp: 60 capsule, Rfl: 11    omeprazole (PriLOSEC) 40 MG capsule, Take 1 capsule (40 mg total) by mouth every evening, Disp: 90 capsule, Rfl: 3    Rosuvastatin Calcium 20 MG CPSP, Take 20 mg by mouth in the morning, Disp: 90 capsule, Rfl: 1    Semaglutide-Weight Management (Wegovy) 2.4 MG/0.75ML, Inject 2.4 mg under the skin weekly, Disp: 3 mL, Rfl: 0    valsartan (DIOVAN) 160 mg tablet, Take 1 tablet (160 mg total) by mouth daily, Disp: 90 tablet, Rfl: 1    apixaban (Eliquis) 5 mg, Take 1 tablet (5 mg total) by mouth 2 (two) times a day Take 10 mg twice daily for 7 days (through 8/15) then on  "8/16 start taking 5 mg twice daily., Disp: 120 tablet, Rfl: 0    naproxen (NAPROSYN) 500 mg tablet, Take 1 tablet (500 mg total) by mouth 2 (two) times a day as needed for moderate pain, Disp: 180 tablet, Rfl: 0    zolpidem (AMBIEN) 10 mg tablet, Take 1 tablet (10 mg total) by mouth daily at bedtime as needed for sleep Patient has old pill bottle and it has 10mg 1 at bedtime for sleep, Disp: 30 tablet, Rfl: 0      Physical Exam:  /76 (BP Location: Right arm, Patient Position: Sitting, Cuff Size: Adult)   Pulse 80   Temp (!) 97.2 °F (36.2 °C)   Resp 18   Ht 6' 4\" (1.93 m)   Wt 121 kg (267 lb)   SpO2 99%   BMI 32.50 kg/m²     Physical Exam  Constitutional:       Appearance: He is well-developed. He is obese.   HENT:      Head: Normocephalic and atraumatic.      Nose: Nose normal.   Eyes:      General: No scleral icterus.        Right eye: No discharge.         Left eye: No discharge.      Conjunctiva/sclera: Conjunctivae normal.      Pupils: Pupils are equal, round, and reactive to light.   Neck:      Thyroid: No thyromegaly.      Trachea: No tracheal deviation.   Cardiovascular:      Rate and Rhythm: Normal rate and regular rhythm.      Heart sounds: Normal heart sounds. No murmur heard.     No friction rub.   Pulmonary:      Effort: Pulmonary effort is normal. No respiratory distress.      Breath sounds: Normal breath sounds. No wheezing or rales.   Chest:      Chest wall: No tenderness.   Abdominal:      General: There is no distension.      Palpations: Abdomen is soft. There is no hepatomegaly or splenomegaly.      Tenderness: There is no abdominal tenderness. There is no guarding or rebound.   Musculoskeletal:         General: No tenderness or deformity. Normal range of motion.      Cervical back: Normal range of motion and neck supple.   Lymphadenopathy:      Cervical: No cervical adenopathy.   Skin:     General: Skin is warm and dry.      Coloration: Skin is not pale.      Findings: No erythema or " rash.   Neurological:      Mental Status: He is alert and oriented to person, place, and time.      Cranial Nerves: No cranial nerve deficit.      Coordination: Coordination normal.      Deep Tendon Reflexes: Reflexes are normal and symmetric.   Psychiatric:         Behavior: Behavior normal.         Thought Content: Thought content normal.         Judgment: Judgment normal.           Labs:  Lab Results   Component Value Date    WBC 5.12 09/25/2024    HGB 14.1 09/25/2024    HCT 42.5 09/25/2024    MCV 94 09/25/2024     09/25/2024     Lab Results   Component Value Date    K 4.0 09/25/2024     09/25/2024    CO2 28 09/25/2024    BUN 11 09/25/2024    CREATININE 0.89 09/25/2024    GLUF 106 (H) 05/03/2024    CALCIUM 9.3 09/25/2024    AST 16 09/25/2024    ALT 20 09/25/2024    ALKPHOS 32 (L) 09/25/2024    EGFR 101 09/25/2024       Patient voiced understanding and agreement in the above discussion. Aware to contact our office with questions/symptoms in the interim.

## 2024-12-14 ENCOUNTER — HOSPITAL ENCOUNTER (EMERGENCY)
Facility: HOSPITAL | Age: 49
Discharge: HOME | End: 2024-12-14
Attending: EMERGENCY MEDICINE
Payer: COMMERCIAL

## 2024-12-14 ENCOUNTER — APPOINTMENT (EMERGENCY)
Dept: CT IMAGING | Facility: HOSPITAL | Age: 49
End: 2024-12-14
Payer: COMMERCIAL

## 2024-12-14 VITALS
BODY MASS INDEX: 31.42 KG/M2 | WEIGHT: 258 LBS | TEMPERATURE: 97.4 F | OXYGEN SATURATION: 96 % | HEIGHT: 76 IN | SYSTOLIC BLOOD PRESSURE: 138 MMHG | RESPIRATION RATE: 19 BRPM | DIASTOLIC BLOOD PRESSURE: 89 MMHG | HEART RATE: 67 BPM

## 2024-12-14 DIAGNOSIS — R42 LIGHTHEADEDNESS: ICD-10-CM

## 2024-12-14 DIAGNOSIS — R06.00 DYSPNEA: Primary | ICD-10-CM

## 2024-12-14 LAB
ANION GAP SERPL CALCULATED.3IONS-SCNC: 6 MMOL/L (ref 4–13)
BASOPHILS # BLD AUTO: 0.03 THOUSANDS/ÂΜL (ref 0–0.1)
BASOPHILS NFR BLD AUTO: 0 % (ref 0–1)
BUN SERPL-MCNC: 17 MG/DL (ref 5–25)
CALCIUM SERPL-MCNC: 9.8 MG/DL (ref 8.4–10.2)
CARDIAC TROPONIN I PNL SERPL HS: 4 NG/L (ref ?–50)
CHLORIDE SERPL-SCNC: 103 MMOL/L (ref 96–108)
CO2 SERPL-SCNC: 28 MMOL/L (ref 21–32)
CREAT SERPL-MCNC: 1.01 MG/DL (ref 0.6–1.3)
EOSINOPHIL # BLD AUTO: 0.06 THOUSAND/ÂΜL (ref 0–0.61)
EOSINOPHIL NFR BLD AUTO: 1 % (ref 0–6)
ERYTHROCYTE [DISTWIDTH] IN BLOOD BY AUTOMATED COUNT: 12.7 % (ref 11.6–15.1)
GFR SERPL CREATININE-BSD FRML MDRD: 86 ML/MIN/1.73SQ M
GLUCOSE SERPL-MCNC: 87 MG/DL (ref 65–140)
HCT VFR BLD AUTO: 44.8 % (ref 36.5–49.3)
HGB BLD-MCNC: 15 G/DL (ref 12–17)
IMM GRANULOCYTES # BLD AUTO: 0.03 THOUSAND/UL (ref 0–0.2)
IMM GRANULOCYTES NFR BLD AUTO: 0 % (ref 0–2)
LYMPHOCYTES # BLD AUTO: 2.7 THOUSANDS/ÂΜL (ref 0.6–4.47)
LYMPHOCYTES NFR BLD AUTO: 38 % (ref 14–44)
MCH RBC QN AUTO: 31.4 PG (ref 26.8–34.3)
MCHC RBC AUTO-ENTMCNC: 33.5 G/DL (ref 31.4–37.4)
MCV RBC AUTO: 94 FL (ref 82–98)
MONOCYTES # BLD AUTO: 0.61 THOUSAND/ÂΜL (ref 0.17–1.22)
MONOCYTES NFR BLD AUTO: 9 % (ref 4–12)
NEUTROPHILS # BLD AUTO: 3.74 THOUSANDS/ÂΜL (ref 1.85–7.62)
NEUTS SEG NFR BLD AUTO: 52 % (ref 43–75)
NRBC BLD AUTO-RTO: 0 /100 WBCS
PLATELET # BLD AUTO: 252 THOUSANDS/UL (ref 149–390)
PMV BLD AUTO: 9.7 FL (ref 8.9–12.7)
POTASSIUM SERPL-SCNC: 3.8 MMOL/L (ref 3.5–5.3)
RBC # BLD AUTO: 4.77 MILLION/UL (ref 3.88–5.62)
SODIUM SERPL-SCNC: 137 MMOL/L (ref 135–147)
WBC # BLD AUTO: 7.17 THOUSAND/UL (ref 4.31–10.16)

## 2024-12-14 PROCEDURE — 36415 COLL VENOUS BLD VENIPUNCTURE: CPT | Performed by: EMERGENCY MEDICINE

## 2024-12-14 PROCEDURE — 84484 ASSAY OF TROPONIN QUANT: CPT | Performed by: EMERGENCY MEDICINE

## 2024-12-14 PROCEDURE — 99285 EMERGENCY DEPT VISIT HI MDM: CPT | Performed by: EMERGENCY MEDICINE

## 2024-12-14 PROCEDURE — 99285 EMERGENCY DEPT VISIT HI MDM: CPT

## 2024-12-14 PROCEDURE — 85025 COMPLETE CBC W/AUTO DIFF WBC: CPT | Performed by: EMERGENCY MEDICINE

## 2024-12-14 PROCEDURE — 93005 ELECTROCARDIOGRAM TRACING: CPT

## 2024-12-14 PROCEDURE — 80048 BASIC METABOLIC PNL TOTAL CA: CPT | Performed by: EMERGENCY MEDICINE

## 2024-12-14 PROCEDURE — 71275 CT ANGIOGRAPHY CHEST: CPT

## 2024-12-14 RX ORDER — SODIUM CHLORIDE 9 MG/ML
3 INJECTION INTRAVENOUS
Status: DISCONTINUED | OUTPATIENT
Start: 2024-12-14 | End: 2024-12-14 | Stop reason: HOSPADM

## 2024-12-14 RX ADMIN — IOHEXOL 85 ML: 350 INJECTION, SOLUTION INTRAVENOUS at 17:33

## 2024-12-15 DIAGNOSIS — E66.01 SEVERE OBESITY (HCC): ICD-10-CM

## 2024-12-15 LAB
ATRIAL RATE: 68 BPM
ATRIAL RATE: 74 BPM
P AXIS: 37 DEGREES
P AXIS: 47 DEGREES
PR INTERVAL: 196 MS
PR INTERVAL: 204 MS
QRS AXIS: 52 DEGREES
QRS AXIS: 58 DEGREES
QRSD INTERVAL: 92 MS
QRSD INTERVAL: 98 MS
QT INTERVAL: 378 MS
QT INTERVAL: 400 MS
QTC INTERVAL: 419 MS
QTC INTERVAL: 425 MS
T WAVE AXIS: 45 DEGREES
T WAVE AXIS: 47 DEGREES
VENTRICULAR RATE: 68 BPM
VENTRICULAR RATE: 74 BPM

## 2024-12-15 PROCEDURE — 93010 ELECTROCARDIOGRAM REPORT: CPT | Performed by: INTERNAL MEDICINE

## 2024-12-17 DIAGNOSIS — I26.99 PE (PULMONARY THROMBOEMBOLISM) (HCC): ICD-10-CM

## 2024-12-17 RX ORDER — SEMAGLUTIDE 2.4 MG/.75ML
INJECTION, SOLUTION SUBCUTANEOUS
Qty: 3 ML | Refills: 0 | Status: SHIPPED | OUTPATIENT
Start: 2024-12-17

## 2024-12-18 RX ORDER — APIXABAN 5 MG/1
TABLET, FILM COATED ORAL
Qty: 120 TABLET | Refills: 0 | Status: SHIPPED | OUTPATIENT
Start: 2024-12-18

## 2024-12-20 NOTE — ED PROVIDER NOTES
Time reflects when diagnosis was documented in both MDM as applicable and the Disposition within this note       Time User Action Codes Description Comment    12/14/2024  6:28 PM Eusebio Oliva Add [R06.00] Dyspnea     12/14/2024  6:28 PM Eusebio Oliva Add [R42] Lightheadedness           ED Disposition       ED Disposition   Discharge    Condition   Stable    Date/Time   Sat Dec 14, 2024  6:28 PM    Comment   Bro Roldan discharge to home/self care.                   Assessment & Plan       Medical Decision Making  Patient is a 49-year-old male who presents for evaluation of exertional dyspnea lightheadedness.  Workup here including troponin CT PE study and EKG were unremarkable.  Advised outpatient follow-up.    Amount and/or Complexity of Data Reviewed  Labs: ordered.  Radiology: ordered.    Risk  Prescription drug management.             Medications   iohexol (OMNIPAQUE) 350 MG/ML injection (MULTI-DOSE) 85 mL (85 mL Intravenous Given 12/14/24 8263)       ED Risk Strat Scores                                              History of Present Illness       Chief Complaint   Patient presents with    Shortness of Breath     According to the he has had a Pe's in the past and the patient reports that he is having the same symptoms.  Patient reports racing heart rate, SOB and dizziness,  patient reports this started thursday evening.       Past Medical History:   Diagnosis Date    Allergic     Anxiety     Arthritis     Asthma     Depression     GERD (gastroesophageal reflux disease)     Hypertension     Inflammatory bowel disease     Obesity     Visual impairment       Past Surgical History:   Procedure Laterality Date    EYE SURGERY      2021/2022    KNEE SURGERY  2006      Family History   Problem Relation Age of Onset    Diabetes Maternal Grandmother     Diabetes Maternal Grandfather     Cancer Paternal Grandfather       Social History     Tobacco Use    Smoking status: Former     Passive exposure: Never     Smokeless tobacco: Never   Vaping Use    Vaping status: Never Used   Substance Use Topics    Alcohol use: Not Currently     Comment: occasional    Drug use: Yes     Types: Marijuana     Comment: Medical Marijuana      E-Cigarette/Vaping    E-Cigarette Use Never User       E-Cigarette/Vaping Substances    Nicotine No     THC No     CBD No     Flavoring No     Other No     Unknown No       I have reviewed and agree with the history as documented.     Patient is a 49-year-old male history of PE currently on anticoagulation presents for evaluation of dyspnea.  He says over the past couple days he has some exertional dyspnea similar to when he had his PE.  He is also had some intermittent lightheadedness upon standing.  He is asymptomatic at rest.  He denies any chest pain nausea or vomiting associate with the symptoms.  He has been compliant with anticoagulation.        Review of Systems   Constitutional:  Negative for fever.   HENT:  Negative for sore throat.    Eyes:  Negative for photophobia.   Respiratory:  Positive for shortness of breath.    Cardiovascular:  Negative for chest pain.   Gastrointestinal:  Negative for abdominal pain.   Genitourinary:  Negative for dysuria.   Musculoskeletal:  Negative for back pain.   Skin:  Negative for rash.   Neurological:  Positive for light-headedness.   Hematological:  Negative for adenopathy.   Psychiatric/Behavioral:  Negative for agitation.    All other systems reviewed and are negative.          Objective       ED Triage Vitals   Temperature Pulse Blood Pressure Respirations SpO2 Patient Position - Orthostatic VS   12/14/24 1532 12/14/24 1532 12/14/24 1532 12/14/24 1532 12/14/24 1532 12/14/24 1532   (!) 97.4 °F (36.3 °C) 75 120/82 18 96 % Lying      Temp Source Heart Rate Source BP Location FiO2 (%) Pain Score    12/14/24 1532 12/14/24 1630 12/14/24 1532 -- 12/14/24 1532    Tympanic Monitor Right arm  5      Vitals      Date and Time Temp Pulse SpO2 Resp BP Pain Score  FACES Pain Rating User   12/14/24 1830 -- 67 96 % 19 138/89 -- -- [facility] [place]   12/14/24 1800 -- 65 95 % 19 126/68 -- -- [facility] [place]   12/14/24 1730 -- 78 96 % 19 140/66 -- -- [facility] [place]   12/14/24 1700 -- 69 97 % 21 118/73 -- -- [facility] [place]   12/14/24 1630 -- 77 95 % 20 129/74 -- -- [facility] [place]   12/14/24 1532 97.4 °F (36.3 °C) 75 96 % 18 120/82 5 -- [facility] [place]            Physical Exam  Vitals reviewed.   Constitutional:       General: He is not in acute distress.     Appearance: He is well-developed.   HENT:      Head: Normocephalic.   Eyes:      Pupils: Pupils are equal, round, and reactive to light.   Cardiovascular:      Rate and Rhythm: Normal rate and regular rhythm.      Heart sounds: Normal heart sounds. No murmur heard.     No friction rub. No gallop.   Pulmonary:      Effort: Pulmonary effort is normal.      Breath sounds: Normal breath sounds.   Abdominal:      General: Bowel sounds are normal. There is no distension.      Palpations: Abdomen is soft.      Tenderness: There is no abdominal tenderness. There is no guarding.   Musculoskeletal:         General: Normal range of motion.      Cervical back: Normal range of motion and neck supple.   Skin:     Capillary Refill: Capillary refill takes less than 2 seconds.   Neurological:      Mental Status: He is alert and oriented to person, place, and time.      Cranial Nerves: No cranial nerve deficit.      Sensory: No sensory deficit.      Motor: No abnormal muscle tone.   Psychiatric:         Behavior: Behavior normal.         Thought Content: Thought content normal.         Judgment: Judgment normal.         Results Reviewed       Procedure Component Value Units Date/Time    HS Troponin 0hr (reflex protocol) [[id number]]  (Normal) Collected: 12/14/24 1644    Lab Status: Final result Specimen: Blood from Arm, Right Updated: 12/14/24 1815     hs TnI 0hr 4 ng/L     Basic metabolic panel [[id number]] Collected: 12/14/24 1644    Lab Status: Final result Specimen: Blood from Arm, Right Updated: 12/14/24 1702      Sodium 137 mmol/L      Potassium 3.8 mmol/L      Chloride 103 mmol/L      CO2 28 mmol/L      ANION GAP 6 mmol/L      BUN 17 mg/dL      Creatinine 1.01 mg/dL      Glucose 87 mg/dL      Calcium 9.8 mg/dL      eGFR 86 ml/min/1.73sq m     Narrative:      National Kid[employer] guidelines for Chronic Kidney Disease (CKD):     Stage 1 with normal or high GFR (GFR > 90 mL/min/1.73 square meters)    Stage 2 Mild CKD (GFR = 60-89 mL/min/1.73 square meters)    Stage 3A Moderate CKD (GFR = 45-59 mL/min/1.73 square meters)    Stage 3B Moderate CKD (GFR = 30-44 mL/min/1.73 square meters)    Stage 4 Severe CKD (GFR = 15-29 mL/min/1.73 square meters)    Stage 5 End Stage CKD (GFR <15 mL/min/1.73 square meters)  Note: GFR calculation is accurate only with a steady state creatinine    CBC and differential [[id number]] Collected: 12/14/24 1644    Lab Status: Final result Specimen: Blood from Arm, Right Updated: 12/14/24 1700     WBC 7.17 Thousand/uL      RBC 4.77 Million/uL      Hemoglobin 15.0 g/dL      Hematocrit 44.8 %      MCV 94 fL      MCH 31.4 pg      MCHC 33.5 g/dL      RDW 12.7 %      MPV 9.7 fL      Platelets 252 Thousands/uL      nRBC 0 /100 WBCs      Segmented % 52 %      Immature Grans % 0 %      Lymphocytes % 38 %      Monocytes % 9 %      Eosinophils Relative 1 %      Basophils Relative 0 %      Absolute Neutrophils 3.74 Thousands/µL      Absolute Immature Grans 0.03 Thousand/uL      Absolute Lymphocytes 2.70 Thousands/µL      Absolute Monocytes 0.61 Thousand/µL      Eosinophils Absolute 0.06 Thousand/µL      Basophils Absolute 0.03 Thousands/µL             CTA chest pe study   Final Interpretation by Simone Watkins MD (12/14 1825)         1. No acute PE identified.   2. Improving, chronic thrombus in the right lower lobe.                  Workstation performed: [id number]             ECG 12 Lead Documentation Only    Date/Time: 12/19/2024 11:25 PM    Performed by: Eusebio Oliva,  MD  Authorized by: Eusebio Oliva MD    ECG reviewed by me, the ED Provider: yes    Patient location:  ED  Previous ECG:     Previous ECG:  Unavailable    Comparison to cardiac monitor: Yes    Interpretation:     Interpretation: normal    Rate:     ECG rate assessment: normal    Rhythm:     Rhythm: sinus rhythm    Ectopy:     Ectopy: none    QRS:     QRS axis:  Normal    QRS intervals:  Normal  Conduction:     Conduction: normal    ST segments:     ST segments:  Normal  T waves:     T waves: normal        ED Medication and Procedure Management   Prior to Admission Medications   Prescriptions Last Dose Informant Patient Reported? Taking?   Multiple Vitamin (multivitamin) tablet  Self Yes No   Sig: Take 1 tablet by mouth daily   Omega-3 Fatty Acids (fish oil) 1,000 mg  Self No No   Sig: Take 2 capsules (2,000 mg total) by mouth daily   Rosuvastatin Calcium 20 MG CPSP  Self No No   Sig: Take 20 mg by mouth in the morning   albuterol (ProAir HFA) 90 mcg/act inhaler  Self No No   Sig: Inhale 1 puff every 4 (four) hours as needed for wheezing or shortness of breath   amphetamine-dextroamphetamine (ADDERALL) 20 mg tablet  Self No No   Sig: Take 0.5 tablets (10 mg total) by mouth 3 (three) times a day Max Daily Amount: 30 mg   cetirizine (ZyrTEC) 10 mg tablet  Self No No   Sig: Take 1 tablet (10 mg total) by mouth every morning   diltiazem (CARDIZEM CD) 180 mg 24 hr capsule  Self No No   Sig: Take 1 capsule (180 mg total) by mouth daily   dorzolamide-timolol (COSOPT) 2-0.5 % ophthalmic solution  Self Yes No   Sig: Administer 1 drop to both eyes 2 (two) times a day   famotidine (PEPCID) 40 MG tablet  Self No No   Sig: Take 1 tablet (40 mg total) by mouth every evening   fluticasone (FLONASE) 50 mcg/act nasal spray  Self No No   Si SPRAY INTO EACH NOSTRIL DAILY   hydrOXYzine HCL (ATARAX) 25 mg tablet  Self No No   Sig: Take 1 tablet (25 mg total) by mouth 3 (three) times a day as needed for anxiety   metoprolol  succinate (TOPROL-XL) 50 mg 24 hr tablet  Self No No   Sig: Take 1 tablet (50 mg total) by mouth daily   naproxen (NAPROSYN) 500 mg tablet   No No   Sig: Take 1 tablet (500 mg total) by mouth 2 (two) times a day as needed for moderate pain   omeprazole (PriLOSEC) 40 MG capsule  Self No No   Sig: Take 1 capsule (40 mg total) by mouth every evening   valsartan (DIOVAN) 160 mg tablet  Self No No   Sig: Take 1 tablet (160 mg total) by mouth daily   zolpidem (AMBIEN) 10 mg tablet   No No   Sig: Take 1 tablet (10 mg total) by mouth daily at bedtime as needed for sleep Patient has old pill bottle and it has 10mg 1 at bedtime for sleep      Facility-Administered Medications: None     Discharge Medication List as of 12/14/2024  6:29 PM        CONTINUE these medications which have NOT CHANGED    Details   albuterol (ProAir HFA) 90 mcg/act inhaler Inhale 1 puff every 4 (four) hours as needed for wheezing or shortness of breath, Starting Fri 4/5/2024, Until Mon 3/31/2025 at 2359, Normal      amphetamine-dextroamphetamine (ADDERALL) 20 mg tablet Take 0.5 tablets (10 mg total) by mouth 3 (three) times a day Max Daily Amount: 30 mg, Starting Fri 10/25/2024, Fill Later      cetirizine (ZyrTEC) 10 mg tablet Take 1 tablet (10 mg total) by mouth every morning, Starting Mon 9/30/2024, Normal      diltiazem (CARDIZEM CD) 180 mg 24 hr capsule Take 1 capsule (180 mg total) by mouth daily, Starting Wed 8/7/2024, Normal      dorzolamide-timolol (COSOPT) 2-0.5 % ophthalmic solution Administer 1 drop to both eyes 2 (two) times a day, Starting Wed 8/21/2024, Historical Med      famotidine (PEPCID) 40 MG tablet Take 1 tablet (40 mg total) by mouth every evening, Starting Fri 10/25/2024, Until Mon 10/20/2025, Fill Later      fluticasone (FLONASE) 50 mcg/act nasal spray 1 SPRAY INTO EACH NOSTRIL DAILY, Starting u 11/21/2024, Normal      hydrOXYzine HCL (ATARAX) 25 mg tablet Take 1 tablet (25 mg total) by mouth 3 (three) times a day as needed  for anxiety, Starting Mon 4/1/2024, Until Thu 3/27/2025 at 2359, Normal      metoprolol succinate (TOPROL-XL) 50 mg 24 hr tablet Take 1 tablet (50 mg total) by mouth daily, Starting Tue 9/17/2024, Normal      Multiple Vitamin (multivitamin) tablet Take 1 tablet by mouth daily, Historical Med      naproxen (NAPROSYN) 500 mg tablet Take 1 tablet (500 mg total) by mouth 2 (two) times a day as needed for moderate pain, Starting Mon 4/1/2024, Until Fri 10/25/2024 at 2359, Sample      Omega-3 Fatty Acids (fish oil) 1,000 mg Take 2 capsules (2,000 mg total) by mouth daily, Starting Mon 4/1/2024, Until Thu 3/27/2025, Sample      omeprazole (PriLOSEC) 40 MG capsule Take 1 capsule (40 mg total) by mouth every evening, Starting Fri 10/25/2024, Until Mon 10/20/2025, Fill Later      Rosuvastatin Calcium 20 MG CPSP Take 20 mg by mouth in the morning, Starting Mon 12/2/2024, Until Thu 11/27/2025, Normal      valsartan (DIOVAN) 160 mg tablet Take 1 tablet (160 mg total) by mouth daily, Starting Mon 10/14/2024, Normal      zolpidem (AMBIEN) 10 mg tablet Take 1 tablet (10 mg total) by mouth daily at bedtime as needed for sleep Patient has old pill bottle and it has 10mg 1 at bedtime for sleep, Starting Fri 10/25/2024, Until Sun 11/24/2024 at 2359, Normal      apixaban (Eliquis) 5 mg Take 1 tablet (5 mg total) by mouth 2 (two) times a day Take 10 mg twice daily for 7 days (through 8/15) then on 8/16 start taking 5 mg twice daily., Starting Thu 12/5/2024, Until Mon 2/3/2025, Normal      Semaglutide-Weight Management (Wegovy) 2.4 MG/0.75ML Inject 2.4 mg under the skin weekly, Normal           No discharge procedures on file.  ED SEPSIS DOCUMENTATION   Time reflects when diagnosis was documented in both MDM as applicable and the Disposition within this note       Time User Action Codes Description Comment    12/14/2024  6:28 PM Eusebio Oliva Add [R06.00] Dyspnea     12/14/2024  6:28 PM Eusebio Oliva Add [R42] Lightheadedness                   Eusebio Oliva MD  12/19/24 8298

## 2025-01-06 DIAGNOSIS — I51.7 BIATRIAL ENLARGEMENT: ICD-10-CM

## 2025-01-06 DIAGNOSIS — R06.09 DOE (DYSPNEA ON EXERTION): ICD-10-CM

## 2025-01-06 DIAGNOSIS — G47.33 OSA (OBSTRUCTIVE SLEEP APNEA): ICD-10-CM

## 2025-01-06 DIAGNOSIS — R53.83 OTHER FATIGUE: ICD-10-CM

## 2025-01-06 DIAGNOSIS — I51.7 LVH (LEFT VENTRICULAR HYPERTROPHY): ICD-10-CM

## 2025-01-06 DIAGNOSIS — I77.810 AORTIC ROOT DILATATION (HCC): ICD-10-CM

## 2025-01-06 DIAGNOSIS — R00.2 PALPITATIONS: ICD-10-CM

## 2025-01-06 DIAGNOSIS — J32.9 SINOBRONCHITIS: ICD-10-CM

## 2025-01-06 DIAGNOSIS — J40 SINOBRONCHITIS: ICD-10-CM

## 2025-01-06 DIAGNOSIS — I10 ESSENTIAL HYPERTENSION: ICD-10-CM

## 2025-01-07 ENCOUNTER — OFFICE VISIT (OUTPATIENT)
Dept: OBGYN CLINIC | Facility: CLINIC | Age: 50
End: 2025-01-07
Payer: COMMERCIAL

## 2025-01-07 ENCOUNTER — APPOINTMENT (OUTPATIENT)
Dept: RADIOLOGY | Facility: CLINIC | Age: 50
End: 2025-01-07
Payer: COMMERCIAL

## 2025-01-07 VITALS — WEIGHT: 259 LBS | BODY MASS INDEX: 31.54 KG/M2 | HEIGHT: 76 IN

## 2025-01-07 DIAGNOSIS — M25.522 PAIN IN LEFT ELBOW: ICD-10-CM

## 2025-01-07 DIAGNOSIS — G47.00 INSOMNIA, UNSPECIFIED TYPE: ICD-10-CM

## 2025-01-07 DIAGNOSIS — E66.01 SEVERE OBESITY (HCC): ICD-10-CM

## 2025-01-07 DIAGNOSIS — M77.12 LATERAL EPICONDYLITIS OF LEFT ELBOW: Primary | ICD-10-CM

## 2025-01-07 DIAGNOSIS — E78.00 HYPERCHOLESTEROLEMIA: ICD-10-CM

## 2025-01-07 DIAGNOSIS — I26.99 PE (PULMONARY THROMBOEMBOLISM) (HCC): ICD-10-CM

## 2025-01-07 PROCEDURE — 20551 NJX 1 TENDON ORIGIN/INSJ: CPT | Performed by: ORTHOPAEDIC SURGERY

## 2025-01-07 PROCEDURE — 73070 X-RAY EXAM OF ELBOW: CPT

## 2025-01-07 PROCEDURE — 99213 OFFICE O/P EST LOW 20 MIN: CPT | Performed by: ORTHOPAEDIC SURGERY

## 2025-01-07 RX ORDER — BETAMETHASONE SODIUM PHOSPHATE AND BETAMETHASONE ACETATE 3; 3 MG/ML; MG/ML
6 INJECTION, SUSPENSION INTRA-ARTICULAR; INTRALESIONAL; INTRAMUSCULAR; SOFT TISSUE
Status: COMPLETED | OUTPATIENT
Start: 2025-01-07 | End: 2025-01-07

## 2025-01-07 RX ORDER — BUPIVACAINE HYDROCHLORIDE 2.5 MG/ML
1 INJECTION, SOLUTION INFILTRATION; PERINEURAL
Status: COMPLETED | OUTPATIENT
Start: 2025-01-07 | End: 2025-01-07

## 2025-01-07 RX ORDER — SEMAGLUTIDE 2.4 MG/.75ML
INJECTION, SOLUTION SUBCUTANEOUS
Qty: 3 ML | Refills: 0 | Status: SHIPPED | OUTPATIENT
Start: 2025-01-07 | End: 2025-01-10 | Stop reason: SDUPTHER

## 2025-01-07 RX ORDER — ZOLPIDEM TARTRATE 10 MG/1
10 TABLET ORAL
Qty: 30 TABLET | Refills: 0 | Status: SHIPPED | OUTPATIENT
Start: 2025-01-07 | End: 2025-01-10 | Stop reason: SDUPTHER

## 2025-01-07 RX ORDER — FLUTICASONE PROPIONATE 50 MCG
1 SPRAY, SUSPENSION (ML) NASAL DAILY
Qty: 16 G | Refills: 3 | Status: SHIPPED | OUTPATIENT
Start: 2025-01-07 | End: 2025-01-10 | Stop reason: SDUPTHER

## 2025-01-07 RX ORDER — DILTIAZEM HYDROCHLORIDE 180 MG/1
180 CAPSULE, COATED, EXTENDED RELEASE ORAL DAILY
Qty: 100 CAPSULE | Refills: 1 | Status: SHIPPED | OUTPATIENT
Start: 2025-01-07 | End: 2025-01-10 | Stop reason: SDUPTHER

## 2025-01-07 RX ADMIN — BETAMETHASONE SODIUM PHOSPHATE AND BETAMETHASONE ACETATE 6 MG: 3; 3 INJECTION, SUSPENSION INTRA-ARTICULAR; INTRALESIONAL; INTRAMUSCULAR; SOFT TISSUE at 15:30

## 2025-01-07 RX ADMIN — BUPIVACAINE HYDROCHLORIDE 1 ML: 2.5 INJECTION, SOLUTION INFILTRATION; PERINEURAL at 15:30

## 2025-01-07 NOTE — PROGRESS NOTES
Assessment/Plan:   Diagnoses and all orders for this visit:    Lateral epicondylitis of left elbow  -     Hand/upper extremity injection    Pain in left elbow  -     XR elbow 2 vw left; Future       Reviewed with patient at time of visit that physical exam and imaging demonstrate lateral epicondylitis of Left elbow. He was offered and accepted an injection(s) of celestone and marcaine to his Left elbow(s) for symptomatic relief of pain and inflammation. Patient tolerated the treatment(s) well. Ice and post injection protocol advised. Weightbearing activities as tolerated. He will be seen for follow-up in 3 months for re-evaluation. Patient expresses understanding and is in agreement with this treatment plan. The patient was given the opportunity to ask questions or present concerns.      The patient has lateral epicondylitis of his left elbow.  Under aseptic technique, the left elbow was injected with Celestone and Marcaine.  He tolerated the procedure well.  Return back in 3 months for reevaluation    Subjective:   Patient ID: Bro Roldan  1975     HPI  Patient is a 49 y.o. male who presents for initial evaluation of left elbow pain. Today, he states he has recently returned to lifting weights and exercising. He has increased pain with repetitive exercises.     The following portions of the patient's history were reviewed and updated as appropriate:  Past medical history, past surgical history, Family history, social history, current medications and allergies    Past Medical History:   Diagnosis Date    Allergic     Anxiety     Arthritis     Asthma     Depression     GERD (gastroesophageal reflux disease)     Hypertension     Inflammatory bowel disease     Obesity     Visual impairment        Past Surgical History:   Procedure Laterality Date    EYE SURGERY      2021/2022    KNEE SURGERY  2006       Family History   Problem Relation Age of Onset    Diabetes Maternal Grandmother     Diabetes Maternal  Grandfather     Cancer Paternal Grandfather        Social History     Socioeconomic History    Marital status: Single     Spouse name: None    Number of children: None    Years of education: None    Highest education level: None   Occupational History    None   Tobacco Use    Smoking status: Former     Passive exposure: Never    Smokeless tobacco: Never   Vaping Use    Vaping status: Never Used   Substance and Sexual Activity    Alcohol use: Not Currently     Comment: occasional    Drug use: Yes     Types: Marijuana     Comment: Medical Marijuana    Sexual activity: Not Currently     Partners: Female   Other Topics Concern    None   Social History Narrative    None     Social Drivers of Health     Financial Resource Strain: Not on file   Food Insecurity: No Food Insecurity (8/9/2024)    Nursing - Inadequate Food Risk Classification     Worried About Running Out of Food in the Last Year: Never true     Ran Out of Food in the Last Year: Never true     Ran Out of Food in the Last Year: Not on file   Transportation Needs: No Transportation Needs (8/9/2024)    PRAPARE - Transportation     Lack of Transportation (Medical): No     Lack of Transportation (Non-Medical): No   Physical Activity: Not on file   Stress: Not on file   Social Connections: Unknown (6/18/2024)    Received from [facility]    Social Connections     How often do you feel lonely or isolated from those around you? (Adult - for ages 18 years and over): Not on file   Intimate Partner Violence: Not on file   Housing Stability: Low Risk  (8/9/2024)    Housing Stability Vital Sign     Unable to Pay for Housing in the Last Year: No     Number of Times Moved in the Last Year: 0     Homeless in the Last Year: No         Current Outpatient Medications:     albuterol (ProAir HFA) 90 mcg/act inhaler, Inhale 1 puff every 4 (four) hours as needed for wheezing or shortness of breath, Disp: 20.1 g, Rfl: 0    amphetamine-dextroamphetamine (ADDERALL) 20 mg tablet, Take 0.5  tablets (10 mg total) by mouth 3 (three) times a day Max Daily Amount: 30 mg, Disp: 90 tablet, Rfl: 0    cetirizine (ZyrTEC) 10 mg tablet, Take 1 tablet (10 mg total) by mouth every morning, Disp: 90 tablet, Rfl: 0    diltiazem (CARDIZEM CD) 180 mg 24 hr capsule, Take 1 capsule (180 mg total) by mouth daily, Disp: 100 capsule, Rfl: 3    dorzolamide-timolol (COSOPT) 2-0.5 % ophthalmic solution, Administer 1 drop to both eyes 2 (two) times a day, Disp: , Rfl:     Eliquis 5 MG, TAKE 2 TABLETS (10MG) TWICE DAILY FOR 7 DAYS THROUGH 8/15 THEN ON 8/16 START TAKING ONE (1) TABLET (5MG) TWICE DAILY, Disp: 120 tablet, Rfl: 0    famotidine (PEPCID) 40 MG tablet, Take 1 tablet (40 mg total) by mouth every evening, Disp: 90 tablet, Rfl: 1    fluticasone (FLONASE) 50 mcg/act nasal spray, 1 SPRAY INTO EACH NOSTRIL DAILY, Disp: 16 g, Rfl: 2    hydrOXYzine HCL (ATARAX) 25 mg tablet, Take 1 tablet (25 mg total) by mouth 3 (three) times a day as needed for anxiety, Disp: 90 tablet, Rfl: 1    metoprolol succinate (TOPROL-XL) 50 mg 24 hr tablet, Take 1 tablet (50 mg total) by mouth daily, Disp: 90 tablet, Rfl: 1    Multiple Vitamin (multivitamin) tablet, Take 1 tablet by mouth daily, Disp: , Rfl:     naproxen (NAPROSYN) 500 mg tablet, Take 1 tablet (500 mg total) by mouth 2 (two) times a day as needed for moderate pain, Disp: 180 tablet, Rfl: 1    Omega-3 Fatty Acids (fish oil) 1,000 mg, Take 2 capsules (2,000 mg total) by mouth daily, Disp: 60 capsule, Rfl: 11    omeprazole (PriLOSEC) 40 MG capsule, Take 1 capsule (40 mg total) by mouth every evening, Disp: 90 capsule, Rfl: 1    Rosuvastatin Calcium 20 MG CPSP, Take 20 mg by mouth in the morning, Disp: 90 capsule, Rfl: 1    Semaglutide-Weight Management (Wegovy) 2.4 MG/0.75ML, Inject 2.4 mg under the skin weekly, Disp: 3 mL, Rfl: 0    valsartan (DIOVAN) 160 mg tablet, Take 1 tablet (160 mg total) by mouth daily, Disp: 90 tablet, Rfl: 1    zolpidem (AMBIEN) 10 mg tablet, Take 1 tablet  "(10 mg total) by mouth daily at bedtime as needed for sleep Patient has old pill bottle and it has 10mg 1 at bedtime for sleep, Disp: 30 tablet, Rfl: 0    Allergies   Allergen Reactions    Cat Hair Extract     Pollen Extract        Review of Systems   Constitutional:  Negative for chills and fever.   HENT:  Negative for ear pain and sore throat.    Eyes:  Negative for pain and visual disturbance.   Respiratory:  Negative for cough and shortness of breath.    Cardiovascular:  Negative for chest pain and palpitations.   Gastrointestinal:  Negative for abdominal pain and vomiting.   Genitourinary:  Negative for dysuria and hematuria.   Musculoskeletal:  Negative for arthralgias and back pain.   Skin:  Negative for color change and rash.   Neurological:  Negative for seizures and syncope.   All other systems reviewed and are negative.       Objective:  Ht 6' 4\" (1.93 m)   Wt 117 kg (259 lb)   BMI 31.53 kg/m²     Ortho Exam  left Elbow -   No anatomical deformity  Skin is warm and dry to touch with no signs of erythema, ecchymosis, or infection   No soft tissue swelling or effusion noted  No palpable crepitus with passive motion  TTP lateral epicondyle  ROM: flexion 130°, extension 0°, pronation 90°,  and supination 90°  Strength: 5/5 throughout  No varus laxity, No valgus laxity  Demonstrates normal shoulder, wrist, and finger motion  No radial head instability  No ulnar nerve subluxation  2+ distal radial pulse with brisk capillary refill to the fingers  Radial, median, and ulnar motor and sensory distrubution intact  Sensation to light touch intact distally       Physical Exam  Vitals and nursing note reviewed.   Constitutional:       General: He is not in acute distress.     Appearance: He is well-developed.   HENT:      Head: Normocephalic and atraumatic.   Eyes:      Conjunctiva/sclera: Conjunctivae normal.   Cardiovascular:      Rate and Rhythm: Normal rate and regular rhythm.      Heart sounds: No murmur " heard.  Pulmonary:      Effort: Pulmonary effort is normal. No respiratory distress.      Breath sounds: Normal breath sounds.   Abdominal:      Palpations: Abdomen is soft.      Tenderness: There is no abdominal tenderness.   Musculoskeletal:         General: No swelling.      Cervical back: Neck supple.   Skin:     General: Skin is warm and dry.      Capillary Refill: Capillary refill takes less than 2 seconds.   Neurological:      Mental Status: He is alert.   Psychiatric:         Mood and Affect: Mood normal.          Diagnostic Test Review:  X-Ray of left elbow obtained on 1/7/2025 were reviewed and demonstrate no acute osseous abnormalities.      Hand/upper extremity injection: L elbow  Universal Protocol:  procedure performed by consultantConsent: Verbal consent obtained.  Risks and benefits: risks, benefits and alternatives were discussed  Consent given by: patient  Timeout called at: 1/7/2025 3:53 PM.  Patient understanding: patient states understanding of the procedure being performed  Site marked: the operative site was marked  Radiology Images displayed and confirmed. If images not available, report reviewed: imaging studies available  Patient identity confirmed: verbally with patient  Supporting Documentation  Indications: joint swelling and pain   Procedure Details  Condition:lateral epicondylitis Site: L elbow   Preparation: Patient was prepped and draped in the usual sterile fashion  Needle size: 25 G  Ultrasound guidance: no  Medications administered: 6 mg betamethasone acetate-betamethasone sodium phosphate 6 (3-3) mg/mL; 1 mL bupivacaine 0.25 %  Patient tolerance: patient tolerated the procedure well with no immediate complications  Dressing:  Sterile dressing applied           Scribe Attestation      I,:  Philly Ayon am acting as a scribe while in the presence of the attending physician.:       I,:  Abdiel Herbert DO personally performed the services described in this documentation    as  scribed in my presence.:

## 2025-01-07 NOTE — PROGRESS NOTES
Assessment/Plan:   Diagnoses and all orders for this visit:    Pain in left elbow  -     XR elbow 2 vw left; Future    ***    Subjective:   Patient ID: Bro Roldan  1975     HPI  Patient is a 49 y.o. male who presents for *** evaluation ***    The following portions of the patient's history were reviewed and updated as appropriate:  Past medical history, past surgical history, Family history, social history, current medications and allergies    Past Medical History:   Diagnosis Date   • Allergic    • Anxiety    • Arthritis    • Asthma    • Depression    • GERD (gastroesophageal reflux disease)    • Hypertension    • Inflammatory bowel disease    • Obesity    • Visual impairment        Past Surgical History:   Procedure Laterality Date   • EYE SURGERY      2021/2022   • KNEE SURGERY  2006       Family History   Problem Relation Age of Onset   • Diabetes Maternal Grandmother    • Diabetes Maternal Grandfather    • Cancer Paternal Grandfather        Social History     Socioeconomic History   • Marital status: Single     Spouse name: None   • Number of children: None   • Years of education: None   • Highest education level: None   Occupational History   • None   Tobacco Use   • Smoking status: Former     Passive exposure: Never   • Smokeless tobacco: Never   Vaping Use   • Vaping status: Never Used   Substance and Sexual Activity   • Alcohol use: Not Currently     Comment: occasional   • Drug use: Yes     Types: Marijuana     Comment: Medical Marijuana   • Sexual activity: Not Currently     Partners: Female   Other Topics Concern   • None   Social History Narrative   • None     Social Drivers of Health     Financial Resource Strain: Not on file   Food Insecurity: No Food Insecurity (8/9/2024)    Nursing - Inadequate Food Risk Classification    • Worried About Running Out of Food in the Last Year: Never true    • Ran Out of Food in the Last Year: Never true    • Ran Out of Food in the Last Year: Not on file    Transportation Needs: No Transportation Needs (8/9/2024)    PRAPARE - Transportation    • Lack of Transportation (Medical): No    • Lack of Transportation (Non-Medical): No   Physical Activity: Not on file   Stress: Not on file   Social Connections: Unknown (6/18/2024)    Received from [employer]    Social Connections    • How often do you feel lonely or isolated from those around you? (Adult - for ages 18 years and over): Not on file   Intimate Partner Violence: Not on file   Housing Stability: Low Risk  (8/9/2024)    Housing Stability Vital Sign    • Unable to Pay for Housing in the Last Year: No    • Number of Times Moved in the Last Year: 0    • Homeless in the Last Year: No         Current Outpatient Medications:   •  albuterol (ProAir HFA) 90 mcg/act inhaler, Inhale 1 puff every 4 (four) hours as needed for wheezing or shortness of breath, Disp: 20.1 g, Rfl: 0  •  amphetamine-dextroamphetamine (ADDERALL) 20 mg tablet, Take 0.5 tablets (10 mg total) by mouth 3 (three) times a day Max Daily Amount: 30 mg, Disp: 90 tablet, Rfl: 0  •  cetirizine (ZyrTEC) 10 mg tablet, Take 1 tablet (10 mg total) by mouth every morning, Disp: 90 tablet, Rfl: 0  •  diltiazem (CARDIZEM CD) 180 mg 24 hr capsule, Take 1 capsule (180 mg total) by mouth daily, Disp: 100 capsule, Rfl: 3  •  dorzolamide-timolol (COSOPT) 2-0.5 % ophthalmic solution, Administer 1 drop to both eyes 2 (two) times a day, Disp: , Rfl:   •  Eliquis 5 MG, TAKE 2 TABLETS (10MG) TWICE DAILY FOR 7 DAYS THROUGH 8/15 THEN ON 8/16 START TAKING ONE (1) TABLET (5MG) TWICE DAILY, Disp: 120 tablet, Rfl: 0  •  famotidine (PEPCID) 40 MG tablet, Take 1 tablet (40 mg total) by mouth every evening, Disp: 90 tablet, Rfl: 1  •  fluticasone (FLONASE) 50 mcg/act nasal spray, 1 SPRAY INTO EACH NOSTRIL DAILY, Disp: 16 g, Rfl: 2  •  hydrOXYzine HCL (ATARAX) 25 mg tablet, Take 1 tablet (25 mg total) by mouth 3 (three) times a day as needed for anxiety, Disp: 90 tablet, Rfl: 1  •   "metoprolol succinate (TOPROL-XL) 50 mg 24 hr tablet, Take 1 tablet (50 mg total) by mouth daily, Disp: 90 tablet, Rfl: 1  •  Multiple Vitamin (multivitamin) tablet, Take 1 tablet by mouth daily, Disp: , Rfl:   •  naproxen (NAPROSYN) 500 mg tablet, Take 1 tablet (500 mg total) by mouth 2 (two) times a day as needed for moderate pain, Disp: 180 tablet, Rfl: 1  •  Omega-3 Fatty Acids (fish oil) 1,000 mg, Take 2 capsules (2,000 mg total) by mouth daily, Disp: 60 capsule, Rfl: 11  •  omeprazole (PriLOSEC) 40 MG capsule, Take 1 capsule (40 mg total) by mouth every evening, Disp: 90 capsule, Rfl: 1  •  Rosuvastatin Calcium 20 MG CPSP, Take 20 mg by mouth in the morning, Disp: 90 capsule, Rfl: 1  •  Semaglutide-Weight Management (Wegovy) 2.4 MG/0.75ML, Inject 2.4 mg under the skin weekly, Disp: 3 mL, Rfl: 0  •  valsartan (DIOVAN) 160 mg tablet, Take 1 tablet (160 mg total) by mouth daily, Disp: 90 tablet, Rfl: 1  •  zolpidem (AMBIEN) 10 mg tablet, Take 1 tablet (10 mg total) by mouth daily at bedtime as needed for sleep Patient has old pill bottle and it has 10mg 1 at bedtime for sleep, Disp: 30 tablet, Rfl: 0    Allergies   Allergen Reactions   • Cat Hair Extract    • Pollen Extract        Review of Systems   Constitutional:  Negative for chills and fever.   HENT:  Negative for ear pain and sore throat.    Eyes:  Negative for pain and visual disturbance.   Respiratory:  Negative for cough and shortness of breath.    Cardiovascular:  Negative for chest pain and palpitations.   Gastrointestinal:  Negative for abdominal pain and vomiting.   Genitourinary:  Negative for dysuria and hematuria.   Musculoskeletal:  Negative for arthralgias and back pain.   Skin:  Negative for color change and rash.   Neurological:  Negative for seizures and syncope.   All other systems reviewed and are negative.       Objective:  Ht 6' 4\" (1.93 m)   Wt 117 kg (259 lb)   BMI 31.53 kg/m²     Ortho Exam  ***    Physical Exam  HENT:      Head: " Normocephalic and atraumatic.      Nose: Nose normal.   Eyes:      Conjunctiva/sclera: Conjunctivae normal.   Cardiovascular:      Rate and Rhythm: Normal rate.   Pulmonary:      Effort: Pulmonary effort is normal.   Musculoskeletal:      Cervical back: Neck supple.   Skin:     General: Skin is warm and dry.      Capillary Refill: Capillary refill takes less than 2 seconds.   Neurological:      Mental Status: He is alert and oriented to person, place, and time.   Psychiatric:         Mood and Affect: Mood normal.         Behavior: Behavior normal.          Diagnostic Test Review:  ***    Procedures   ***    Scribe Attestation    I,:  Jes Holbrook am acting as a scribe while in the presence of the attending physician.:       I,:  Abdiel Herbert, DO personally performed the services described in this documentation    as scribed in my presence.:

## 2025-01-10 ENCOUNTER — OFFICE VISIT (OUTPATIENT)
Dept: INTERNAL MEDICINE CLINIC | Facility: CLINIC | Age: 50
End: 2025-01-10
Payer: COMMERCIAL

## 2025-01-10 VITALS
HEIGHT: 76 IN | OXYGEN SATURATION: 99 % | HEART RATE: 64 BPM | BODY MASS INDEX: 31.9 KG/M2 | SYSTOLIC BLOOD PRESSURE: 112 MMHG | WEIGHT: 262 LBS | DIASTOLIC BLOOD PRESSURE: 70 MMHG | TEMPERATURE: 97.6 F

## 2025-01-10 DIAGNOSIS — J40 SINOBRONCHITIS: ICD-10-CM

## 2025-01-10 DIAGNOSIS — J32.9 SINOBRONCHITIS: ICD-10-CM

## 2025-01-10 DIAGNOSIS — R06.09 DOE (DYSPNEA ON EXERTION): ICD-10-CM

## 2025-01-10 DIAGNOSIS — F41.9 ANXIETY: ICD-10-CM

## 2025-01-10 DIAGNOSIS — J45.21 MILD INTERMITTENT ASTHMATIC BRONCHITIS WITH ACUTE EXACERBATION: ICD-10-CM

## 2025-01-10 DIAGNOSIS — I51.7 LVH (LEFT VENTRICULAR HYPERTROPHY): ICD-10-CM

## 2025-01-10 DIAGNOSIS — I77.810 AORTIC ROOT DILATATION (HCC): ICD-10-CM

## 2025-01-10 DIAGNOSIS — I10 ESSENTIAL HYPERTENSION: ICD-10-CM

## 2025-01-10 DIAGNOSIS — J30.1 SEASONAL ALLERGIC RHINITIS DUE TO POLLEN: ICD-10-CM

## 2025-01-10 DIAGNOSIS — R53.83 OTHER FATIGUE: ICD-10-CM

## 2025-01-10 DIAGNOSIS — R00.2 PALPITATIONS: ICD-10-CM

## 2025-01-10 DIAGNOSIS — M54.50 LUMBAR SPINE PAIN: ICD-10-CM

## 2025-01-10 DIAGNOSIS — I26.99 PE (PULMONARY THROMBOEMBOLISM) (HCC): ICD-10-CM

## 2025-01-10 DIAGNOSIS — K21.9 GASTROESOPHAGEAL REFLUX DISEASE WITHOUT ESOPHAGITIS: ICD-10-CM

## 2025-01-10 DIAGNOSIS — E78.00 HYPERCHOLESTEROLEMIA: ICD-10-CM

## 2025-01-10 DIAGNOSIS — I51.7 BIATRIAL ENLARGEMENT: ICD-10-CM

## 2025-01-10 DIAGNOSIS — Z23 ENCOUNTER FOR IMMUNIZATION: Primary | ICD-10-CM

## 2025-01-10 DIAGNOSIS — E66.01 SEVERE OBESITY (HCC): ICD-10-CM

## 2025-01-10 DIAGNOSIS — G47.00 INSOMNIA, UNSPECIFIED TYPE: ICD-10-CM

## 2025-01-10 DIAGNOSIS — G47.33 OSA (OBSTRUCTIVE SLEEP APNEA): ICD-10-CM

## 2025-01-10 PROCEDURE — 99214 OFFICE O/P EST MOD 30 MIN: CPT | Performed by: INTERNAL MEDICINE

## 2025-01-10 RX ORDER — DORZOLAMIDE HYDROCHLORIDE AND TIMOLOL MALEATE 20; 5 MG/ML; MG/ML
1 SOLUTION/ DROPS OPHTHALMIC 2 TIMES DAILY
Qty: 3 ML | Status: CANCELLED | OUTPATIENT
Start: 2025-01-10

## 2025-01-10 RX ORDER — SEMAGLUTIDE 2.4 MG/.75ML
INJECTION, SOLUTION SUBCUTANEOUS
Qty: 9 ML | Refills: 1 | Status: SHIPPED | OUTPATIENT
Start: 2025-01-10 | End: 2025-07-10

## 2025-01-10 RX ORDER — OMEPRAZOLE 40 MG/1
40 CAPSULE, DELAYED RELEASE ORAL EVERY EVENING
Qty: 90 CAPSULE | Refills: 1 | Status: SHIPPED | OUTPATIENT
Start: 2025-01-10 | End: 2025-07-09

## 2025-01-10 RX ORDER — VALSARTAN 160 MG/1
160 TABLET ORAL DAILY
Qty: 90 TABLET | Refills: 1 | Status: SHIPPED | OUTPATIENT
Start: 2025-01-10 | End: 2025-07-09

## 2025-01-10 RX ORDER — FLUTICASONE PROPIONATE 50 MCG
1 SPRAY, SUSPENSION (ML) NASAL DAILY
Qty: 48 G | Refills: 1 | Status: SHIPPED | OUTPATIENT
Start: 2025-01-10 | End: 2025-07-09

## 2025-01-10 RX ORDER — ALBUTEROL SULFATE 90 UG/1
1 INHALANT RESPIRATORY (INHALATION) EVERY 4 HOURS PRN
Qty: 20.1 G | Refills: 1 | Status: SHIPPED | OUTPATIENT
Start: 2025-01-10 | End: 2025-07-09

## 2025-01-10 RX ORDER — DILTIAZEM HYDROCHLORIDE 180 MG/1
180 CAPSULE, COATED, EXTENDED RELEASE ORAL DAILY
Qty: 90 CAPSULE | Refills: 1 | Status: SHIPPED | OUTPATIENT
Start: 2025-01-10 | End: 2025-07-09

## 2025-01-10 RX ORDER — HYDROXYZINE HYDROCHLORIDE 25 MG/1
25 TABLET, FILM COATED ORAL 3 TIMES DAILY PRN
Qty: 270 TABLET | Refills: 1 | Status: SHIPPED | OUTPATIENT
Start: 2025-01-10 | End: 2025-07-09

## 2025-01-10 RX ORDER — FAMOTIDINE 40 MG/1
40 TABLET, FILM COATED ORAL EVERY EVENING
Qty: 90 TABLET | Refills: 1 | Status: SHIPPED | OUTPATIENT
Start: 2025-01-10 | End: 2025-07-09

## 2025-01-10 RX ORDER — ZOLPIDEM TARTRATE 10 MG/1
10 TABLET ORAL
Qty: 30 TABLET | Refills: 0 | Status: SHIPPED | OUTPATIENT
Start: 2025-01-10 | End: 2025-02-09

## 2025-01-10 RX ORDER — METOPROLOL SUCCINATE 50 MG/1
50 TABLET, EXTENDED RELEASE ORAL DAILY
Qty: 90 TABLET | Refills: 1 | Status: SHIPPED | OUTPATIENT
Start: 2025-01-10 | End: 2025-07-09

## 2025-01-10 RX ORDER — NAPROXEN 500 MG/1
500 TABLET ORAL 2 TIMES DAILY PRN
Qty: 180 TABLET | Refills: 1 | Status: SHIPPED | OUTPATIENT
Start: 2025-01-10 | End: 2025-07-09

## 2025-01-10 RX ORDER — CETIRIZINE HYDROCHLORIDE 10 MG/1
10 TABLET ORAL EVERY MORNING
Qty: 90 TABLET | Refills: 1 | Status: SHIPPED | OUTPATIENT
Start: 2025-01-10 | End: 2025-07-09

## 2025-01-13 ENCOUNTER — TELEPHONE (OUTPATIENT)
Age: 50
End: 2025-01-13

## 2025-01-13 NOTE — TELEPHONE ENCOUNTER
PA for Rosuvastatin Calcium 20 MG CPSP SUBMITTED to     via    []CMM-KEY:   []Surescripts-Case ID #   []Availity-Auth ID # NDC #   []Faxed to plan   [x]Other website [employer] ID1 [id number]  []Phone call Case ID #     [x]PA sent as URGENT    All office notes, labs and other pertaining documents and studies sent. Clinical questions answered. Awaiting determination from insurance company.     Turnaround time for your insurance to make a decision on your Prior Authorization can take 7-21 business days.

## 2025-01-16 NOTE — ASSESSMENT & PLAN NOTE
Orders:  •  diltiazem (CARDIZEM CD) 180 mg 24 hr capsule; Take 1 capsule (180 mg total) by mouth daily  •  metoprolol succinate (TOPROL-XL) 50 mg 24 hr tablet; Take 1 tablet (50 mg total) by mouth daily  •  valsartan (DIOVAN) 160 mg tablet; Take 1 tablet (160 mg total) by mouth daily

## 2025-01-16 NOTE — ASSESSMENT & PLAN NOTE
Orders:  •  diltiazem (CARDIZEM CD) 180 mg 24 hr capsule; Take 1 capsule (180 mg total) by mouth daily

## 2025-01-16 NOTE — ASSESSMENT & PLAN NOTE
Orders:  •  famotidine (PEPCID) 40 MG tablet; Take 1 tablet (40 mg total) by mouth every evening  •  omeprazole (PriLOSEC) 40 MG capsule; Take 1 capsule (40 mg total) by mouth every evening

## 2025-01-16 NOTE — PROGRESS NOTES
Name: Bro Roldan      : 1975      MRN: [id number]  Encounter Provider: Roberto Blackman DO  Encounter Date: 1/10/2025   Encounter department: [facility]  :  Assessment & Plan  Encounter for immunization         Mild intermittent asthmatic bronchitis with acute exacerbation    Orders:  •  albuterol (ProAir HFA) 90 mcg/act inhaler; Inhale 1 puff every 4 (four) hours as needed for wheezing or shortness of breath    PE (pulmonary thromboembolism) ([facility])    Orders:  •  apixaban (Eliquis) 5 mg; Take 1 tablet (5 mg total) by mouth 2 (two) times a day    Seasonal allergic rhinitis due to pollen    Orders:  •  cetirizine (ZyrTEC) 10 mg tablet; Take 1 tablet (10 mg total) by mouth every morning    Essential hypertension    Orders:  •  diltiazem (CARDIZEM CD) 180 mg 24 hr capsule; Take 1 capsule (180 mg total) by mouth daily  •  metoprolol succinate (TOPROL-XL) 50 mg 24 hr tablet; Take 1 tablet (50 mg total) by mouth daily  •  valsartan (DIOVAN) 160 mg tablet; Take 1 tablet (160 mg total) by mouth daily    Biatrial enlargement    Orders:  •  diltiazem (CARDIZEM CD) 180 mg 24 hr capsule; Take 1 capsule (180 mg total) by mouth daily    LVH (left ventricular hypertrophy)    Orders:  •  diltiazem (CARDIZEM CD) 180 mg 24 hr capsule; Take 1 capsule (180 mg total) by mouth daily    Aortic root dilatation (HCC)    Orders:  •  diltiazem (CARDIZEM CD) 180 mg 24 hr capsule; Take 1 capsule (180 mg total) by mouth daily    Palpitations    Orders:  •  diltiazem (CARDIZEM CD) 180 mg 24 hr capsule; Take 1 capsule (180 mg total) by mouth daily    NEELY (dyspnea on exertion)    Orders:  •  diltiazem (CARDIZEM CD) 180 mg 24 hr capsule; Take 1 capsule (180 mg total) by mouth daily    Other fatigue    Orders:  •  diltiazem (CARDIZEM CD) 180 mg 24 hr capsule; Take 1 capsule (180 mg total) by mouth daily    JORGE (obstructive sleep apnea)    Orders:  •  diltiazem (CARDIZEM CD) 180 mg 24 hr capsule; Take 1 capsule (180 mg  "total) by mouth daily    Gastroesophageal reflux disease without esophagitis    Orders:  •  famotidine (PEPCID) 40 MG tablet; Take 1 tablet (40 mg total) by mouth every evening  •  omeprazole (PriLOSEC) 40 MG capsule; Take 1 capsule (40 mg total) by mouth every evening    Sinobronchitis    Orders:  •  fluticasone (FLONASE) 50 mcg/act nasal spray; 1 spray into each nostril daily    Anxiety    Orders:  •  hydrOXYzine HCL (ATARAX) 25 mg tablet; Take 1 tablet (25 mg total) by mouth 3 (three) times a day as needed for anxiety    Lumbar spine pain    Orders:  •  naproxen (NAPROSYN) 500 mg tablet; Take 1 tablet (500 mg total) by mouth 2 (two) times a day as needed for moderate pain    Hypercholesterolemia    Orders:  •  Rosuvastatin Calcium 20 MG CPSP; Take 20 mg by mouth in the morning    Severe obesity (HCC)    Orders:  •  Semaglutide-Weight Management (Wegovy) 2.4 MG/0.75ML; Inject 2.4 mg under the skin weekly    Insomnia, unspecified type    Orders:  •  zolpidem (AMBIEN) 10 mg tablet; Take 1 tablet (10 mg total) by mouth daily at bedtime as needed for sleep Patient has old pill bottle and it has 10mg 1 at bedtime for sleep           History of Present Illness     No ocncerns  Review of Systems   Constitutional:  Negative for chills and fever.   HENT:  Negative for ear pain and sore throat.    Eyes:  Negative for pain and visual disturbance.   Respiratory:  Negative for cough and shortness of breath.    Cardiovascular:  Negative for chest pain and palpitations.   Gastrointestinal:  Negative for abdominal pain and vomiting.   Genitourinary:  Negative for dysuria and hematuria.   Musculoskeletal:  Negative for arthralgias and back pain.   Skin:  Negative for color change and rash.   Neurological:  Negative for seizures and syncope.   All other systems reviewed and are negative.      Objective   /70   Pulse 64   Temp 97.6 °F (36.4 °C) (Tympanic)   Ht 6' 4\" (1.93 m)   Wt 119 kg (262 lb)   SpO2 99%   BMI 31.89 " kg/m²      Physical Exam  Vitals and nursing note reviewed.   Constitutional:       General: He is not in acute distress.     Appearance: He is well-developed.   HENT:      Head: Normocephalic and atraumatic.   Eyes:      Conjunctiva/sclera: Conjunctivae normal.   Cardiovascular:      Rate and Rhythm: Normal rate and regular rhythm.      Heart sounds: No murmur heard.  Pulmonary:      Effort: Pulmonary effort is normal. No respiratory distress.      Breath sounds: Normal breath sounds.   Abdominal:      Palpations: Abdomen is soft.      Tenderness: There is no abdominal tenderness.   Musculoskeletal:         General: No swelling.      Cervical back: Neck supple.   Skin:     General: Skin is warm and dry.      Capillary Refill: Capillary refill takes less than 2 seconds.   Neurological:      Mental Status: He is alert.   Psychiatric:         Mood and Affect: Mood normal.

## 2025-01-16 NOTE — ASSESSMENT & PLAN NOTE
Orders:  •  naproxen (NAPROSYN) 500 mg tablet; Take 1 tablet (500 mg total) by mouth 2 (two) times a day as needed for moderate pain

## 2025-01-28 ENCOUNTER — OFFICE VISIT (OUTPATIENT)
Dept: OBGYN CLINIC | Facility: CLINIC | Age: 50
End: 2025-01-28
Payer: COMMERCIAL

## 2025-01-28 VITALS — WEIGHT: 257 LBS | BODY MASS INDEX: 31.29 KG/M2 | HEIGHT: 76 IN

## 2025-01-28 DIAGNOSIS — M54.6 THORACIC SPINE PAIN: Primary | ICD-10-CM

## 2025-01-28 DIAGNOSIS — M17.0 PRIMARY OSTEOARTHRITIS OF BOTH KNEES: ICD-10-CM

## 2025-01-28 PROCEDURE — 20610 DRAIN/INJ JOINT/BURSA W/O US: CPT | Performed by: ORTHOPAEDIC SURGERY

## 2025-01-28 PROCEDURE — 99213 OFFICE O/P EST LOW 20 MIN: CPT | Performed by: ORTHOPAEDIC SURGERY

## 2025-01-28 RX ORDER — TRIAMCINOLONE ACETONIDE 40 MG/ML
80 INJECTION, SUSPENSION INTRA-ARTICULAR; INTRAMUSCULAR
Status: COMPLETED | OUTPATIENT
Start: 2025-01-28 | End: 2025-01-28

## 2025-01-28 RX ORDER — BUPIVACAINE HYDROCHLORIDE 2.5 MG/ML
4 INJECTION, SOLUTION INFILTRATION; PERINEURAL
Status: COMPLETED | OUTPATIENT
Start: 2025-01-28 | End: 2025-01-28

## 2025-01-28 RX ADMIN — TRIAMCINOLONE ACETONIDE 80 MG: 40 INJECTION, SUSPENSION INTRA-ARTICULAR; INTRAMUSCULAR at 13:30

## 2025-01-28 RX ADMIN — BUPIVACAINE HYDROCHLORIDE 4 ML: 2.5 INJECTION, SOLUTION INFILTRATION; PERINEURAL at 13:30

## 2025-01-28 NOTE — PROGRESS NOTES
ASSESSMENT/PLAN:    Diagnoses and all orders for this visit:    Thoracic spine pain  -     Ambulatory referral to Spine & Pain Management; Future    Primary osteoarthritis of both knees    Other orders  -     Large joint arthrocentesis        Both the patient's knees were injected with Kenalog and Marcaine.  He tolerated the injections quite well.  He will follow-up with our office in 3 months.  He was also given a referral to spine and pain for his thoracic spine pain.  He is acceptable to this plan.    Return in about 3 months (around 4/28/2025).    The patient has degenerative joint disease of his bilateral knees.  Under aseptic technique, both knees were injected with Kenalog and Marcaine.  He tolerated procedures well.  Return back in 3 months for reevaluation      _____________________________________________________  CHIEF COMPLAINT:  Chief Complaint   Patient presents with    Left Knee - Follow-up    Right Knee - Follow-up         SUBJECTIVE:  Bro Roldan is a 49 y.o. male who presents to our office complaining of bilateral knee pain.  He would like corticosteroid injections today, as they provide him with relief of symptoms in the past.  He is also complaining of pain along his thoracic spine region.    The following portions of the patient's history were reviewed and updated as appropriate: allergies, current medications, past family history, past medical history, past social history, past surgical history and problem list.    PAST MEDICAL HISTORY:  Past Medical History:   Diagnosis Date    Allergic     Anxiety     Arthritis     Asthma     Depression     GERD (gastroesophageal reflux disease)     Hypertension     Inflammatory bowel disease     Obesity     Visual impairment        PAST SURGICAL HISTORY:  Past Surgical History:   Procedure Laterality Date    EYE SURGERY      2021/2022    KNEE SURGERY  2006       FAMILY HISTORY:  Family History   Problem Relation Age of Onset    Diabetes Maternal Grandmother      Diabetes Maternal Grandfather     Cancer Paternal Grandfather        SOCIAL HISTORY:  Social History     Tobacco Use    Smoking status: Former     Passive exposure: Never    Smokeless tobacco: Never   Vaping Use    Vaping status: Never Used   Substance Use Topics    Alcohol use: Not Currently     Comment: occasional    Drug use: Yes     Types: Marijuana     Comment: Medical Marijuana       MEDICATIONS:    Current Outpatient Medications:     albuterol (ProAir HFA) 90 mcg/act inhaler, Inhale 1 puff every 4 (four) hours as needed for wheezing or shortness of breath, Disp: 20.1 g, Rfl: 1    amphetamine-dextroamphetamine (ADDERALL) 20 mg tablet, Take 0.5 tablets (10 mg total) by mouth 3 (three) times a day Max Daily Amount: 30 mg, Disp: 90 tablet, Rfl: 0    apixaban (Eliquis) 5 mg, Take 1 tablet (5 mg total) by mouth 2 (two) times a day, Disp: 180 tablet, Rfl: 1    cetirizine (ZyrTEC) 10 mg tablet, Take 1 tablet (10 mg total) by mouth every morning, Disp: 90 tablet, Rfl: 1    diltiazem (CARDIZEM CD) 180 mg 24 hr capsule, Take 1 capsule (180 mg total) by mouth daily, Disp: 90 capsule, Rfl: 1    dorzolamide-timolol (COSOPT) 2-0.5 % ophthalmic solution, Administer 1 drop to both eyes 2 (two) times a day, Disp: , Rfl:     famotidine (PEPCID) 40 MG tablet, Take 1 tablet (40 mg total) by mouth every evening, Disp: 90 tablet, Rfl: 1    fluticasone (FLONASE) 50 mcg/act nasal spray, 1 spray into each nostril daily, Disp: 48 g, Rfl: 1    hydrOXYzine HCL (ATARAX) 25 mg tablet, Take 1 tablet (25 mg total) by mouth 3 (three) times a day as needed for anxiety, Disp: 270 tablet, Rfl: 1    metoprolol succinate (TOPROL-XL) 50 mg 24 hr tablet, Take 1 tablet (50 mg total) by mouth daily, Disp: 90 tablet, Rfl: 1    Multiple Vitamin (multivitamin) tablet, Take 1 tablet by mouth daily, Disp: , Rfl:     naproxen (NAPROSYN) 500 mg tablet, Take 1 tablet (500 mg total) by mouth 2 (two) times a day as needed for moderate pain, Disp: 180  "tablet, Rfl: 1    Omega-3 Fatty Acids (fish oil) 1,000 mg, Take 2 capsules (2,000 mg total) by mouth daily, Disp: 60 capsule, Rfl: 11    omeprazole (PriLOSEC) 40 MG capsule, Take 1 capsule (40 mg total) by mouth every evening, Disp: 90 capsule, Rfl: 1    Rosuvastatin Calcium 20 MG CPSP, Take 20 mg by mouth in the morning, Disp: 90 capsule, Rfl: 1    Semaglutide-Weight Management (Wegovy) 2.4 MG/0.75ML, Inject 2.4 mg under the skin weekly, Disp: 9 mL, Rfl: 1    valsartan (DIOVAN) 160 mg tablet, Take 1 tablet (160 mg total) by mouth daily, Disp: 90 tablet, Rfl: 1    zolpidem (AMBIEN) 10 mg tablet, Take 1 tablet (10 mg total) by mouth daily at bedtime as needed for sleep Patient has old pill bottle and it has 10mg 1 at bedtime for sleep, Disp: 30 tablet, Rfl: 0    ALLERGIES:  Allergies   Allergen Reactions    Cat Dander     Pollen Extract        ROS:  Review of Systems     Constitutional: Negative for fatigue, fever or loss of appetite.   HENT: Negative.    Respiratory: Negative for shortness of breath, dyspnea.    Cardiovascular: Negative for chest pain/tightness.   Gastrointestinal: Negative for abdominal pain, N/V.   Endocrine: Negative for cold/heat intolerance, unexplained weight loss/gain.   Genitourinary: Negative for flank pain, dysuria, hematuria.   Musculoskeletal: Positive for arthralgia   Skin: Negative for rash.    Neurological: Negative for numbness or tingling  Psychiatric/Behavioral: Negative for agitation.  _____________________________________________________  PHYSICAL EXAMINATION:    Height 6' 4\" (1.93 m), weight 117 kg (257 lb).    Constitutional: Oriented to person, place, and time. Appears well-developed and well-nourished. No distress.   HENT:   Head: Normocephalic.   Eyes: Conjunctivae are normal. Right eye exhibits no discharge. Left eye exhibits no discharge. No scleral icterus.   Cardiovascular: Normal rate.    Pulmonary/Chest: Effort normal.   Neurological: Alert and oriented to person, " "place, and time.   Skin: Skin is warm and dry. No rash noted. Not diaphoretic. No erythema. No pallor.   Psychiatric: Normal mood and affect. Behavior is normal. Judgment and thought content normal.      MUSCULOSKELETAL EXAMINATION:   Physical Exam  Ortho Exam    Bilateral lower extremities are neurovascularly intact  Toes are pink and mobile   Compartments are soft  No warmth, erythema or ecchymosis  ROM of knees are from 5-115 degrees  Negative Lachman, drawer or pivot shift  No medial instability  Medial joint line tenderness, slight lateral joint line tenderness  Patellofemoral crepitation   Objective:  BP Readings from Last 1 Encounters:   01/10/25 112/70      Wt Readings from Last 1 Encounters:   01/28/25 117 kg (257 lb)        BMI:   Estimated body mass index is 31.28 kg/m² as calculated from the following:    Height as of this encounter: 6' 4\" (1.93 m).    Weight as of this encounter: 117 kg (257 lb).      PROCEDURES PERFORMED:  Large joint arthrocentesis: bilateral knee  Universal Protocol:  Consent: Verbal consent obtained.  Risks and benefits: risks, benefits and alternatives were discussed  Consent given by: patient  Patient understanding: patient states understanding of the procedure being performed  Site marked: the operative site was marked  Supporting Documentation  Indications: pain   Procedure Details  Location: knee - bilateral knee  Preparation: Patient was prepped and draped in the usual sterile fashion  Needle size: 22 G  Ultrasound guidance: no  Approach: lateral    Medications (Right): 4 mL bupivacaine 0.25 %; 80 mg triamcinolone acetonide 40 mg/mLMedications (Left): 4 mL bupivacaine 0.25 %; 80 mg triamcinolone acetonide 40 mg/mL   Patient tolerance: patient tolerated the procedure well with no immediate complications  Dressing:  Sterile dressing applied            Scribe Attestation      I,:  Neal Hedrick PA-C am acting as a scribe while in the presence of the attending physician.: "       I,:  Abdiel Herbert DO personally performed the services described in this documentation    as scribed in my presence.:

## 2025-01-30 ENCOUNTER — CONSULT (OUTPATIENT)
Dept: PAIN MEDICINE | Facility: CLINIC | Age: 50
End: 2025-01-30
Payer: COMMERCIAL

## 2025-01-30 VITALS — HEIGHT: 76 IN | BODY MASS INDEX: 31.17 KG/M2 | WEIGHT: 256 LBS

## 2025-01-30 DIAGNOSIS — G25.89 SCAPULAR DYSKINESIS: Primary | ICD-10-CM

## 2025-01-30 DIAGNOSIS — M54.9 MID BACK PAIN: ICD-10-CM

## 2025-01-30 DIAGNOSIS — M79.18 MYOFASCIAL PAIN SYNDROME: ICD-10-CM

## 2025-01-30 PROCEDURE — 99204 OFFICE O/P NEW MOD 45 MIN: CPT | Performed by: STUDENT IN AN ORGANIZED HEALTH CARE EDUCATION/TRAINING PROGRAM

## 2025-01-30 NOTE — PROGRESS NOTES
"Assessment:  1. Scapular dyskinesis    2. Mid back pain    3. Myofascial pain syndrome        Portions of the record may have been created with voice recognition software. Occasional wrong word or \"sound a like\" substitutions may have occurred due to the inherent limitations of voice recognition software. Read the chart carefully and recognize, using context, where substitutions have occurred. Contact me with any questions.      Plan:  49-year-old male with history of JORGE, PE on Eliquis, referred by Dr. Herbert, here for initial evaluation of chronic left-sided upper/mid back pain.  Pain is limited to a focal spot over left medial periscapular border. He denies radicular pain, paresthesias, new or progressive weakness, saddle anesthesia, BBI.  Notes symptom limited difficulty with upper extremity function during pain flares.  Notes symptoms improve with rest.  He has previously seen other pain provider for the symptoms and undergone trigger point injections in the distant past with short-term improvement.  Notes he has undergone some physical therapy in the past and continues a home exercise program without significant long-term improvement.  Pain symptoms likely myofascial in nature secondary to subtle scapular dyskinesia.      Discussed option of trigger point injections for symptom management.  Patient elected to schedule these today.    Discussed course of physical therapy to address scapular dyskinesia.  Patient defers.  Also discussed techniques for myofascial release including use of a 'Back Buddy', chiropractic treatment, massage therapy.    Follow up in 1 month after injection.        Complete risks and benefits including bleeding, infection, tissue reaction, nerve injury and allergic reaction were discussed. The approach was demonstrated using models and literature was provided. Verbal and written consent was obtained.      History of Present Illness:    The patient is a 49 y.o. male who presents for " consultation in regards to Back Pain (Upper left back).  Symptoms have been present for 30 years. Symptoms began without any precipitating injury or trauma. Pain is reported to be 8 on the numeric rating scale.  Symptoms are felt nearly constantly and worst in the no typical pattern.  Symptoms are characterized as pressure-like.  Symptoms are associated with no weakness.  Aggravating factors include standing, walking, and exercise.  Relieving factors include lying down and relaxation.        Review of Systems:    Review of Systems   Constitutional:  Negative for chills and fever.   HENT:  Negative for ear pain, sinus pressure and sore throat.    Eyes:  Negative for pain and redness.   Respiratory:  Negative for cough and wheezing.    Cardiovascular:  Negative for leg swelling.   Gastrointestinal:  Negative for abdominal pain and nausea.   Endocrine: Negative for polydipsia and polyuria.   Genitourinary:  Negative for difficulty urinating and frequency.   Musculoskeletal:  Positive for back pain and myalgias. Negative for gait problem.   Skin:  Negative for pallor and wound.   Neurological:  Negative for seizures, weakness and headaches.   Psychiatric/Behavioral:  Positive for decreased concentration. Negative for sleep disturbance.            Past Medical History:   Diagnosis Date    Allergic     Anxiety     Arthritis     Asthma     Depression     GERD (gastroesophageal reflux disease)     Hypertension     Inflammatory bowel disease     Obesity     Visual impairment        Past Surgical History:   Procedure Laterality Date    EYE SURGERY      2021/2022    KNEE SURGERY  2006       Family History   Problem Relation Age of Onset    Diabetes Maternal Grandmother     Diabetes Maternal Grandfather     Cancer Paternal Grandfather        Social History     Occupational History    Not on file   Tobacco Use    Smoking status: Former     Passive exposure: Never    Smokeless tobacco: Never   Vaping Use    Vaping status: Never  Used   Substance and Sexual Activity    Alcohol use: Not Currently     Comment: rare    Drug use: Not Currently     Types: Marijuana     Comment: Medical Marijuana    Sexual activity: Not Currently     Partners: Female         Current Outpatient Medications:     albuterol (ProAir HFA) 90 mcg/act inhaler, Inhale 1 puff every 4 (four) hours as needed for wheezing or shortness of breath, Disp: 20.1 g, Rfl: 1    amphetamine-dextroamphetamine (ADDERALL) 20 mg tablet, Take 0.5 tablets (10 mg total) by mouth 3 (three) times a day Max Daily Amount: 30 mg, Disp: 90 tablet, Rfl: 0    apixaban (Eliquis) 5 mg, Take 1 tablet (5 mg total) by mouth 2 (two) times a day, Disp: 180 tablet, Rfl: 1    cetirizine (ZyrTEC) 10 mg tablet, Take 1 tablet (10 mg total) by mouth every morning, Disp: 90 tablet, Rfl: 1    diltiazem (CARDIZEM CD) 180 mg 24 hr capsule, Take 1 capsule (180 mg total) by mouth daily, Disp: 90 capsule, Rfl: 1    dorzolamide-timolol (COSOPT) 2-0.5 % ophthalmic solution, Administer 1 drop to both eyes 2 (two) times a day, Disp: , Rfl:     famotidine (PEPCID) 40 MG tablet, Take 1 tablet (40 mg total) by mouth every evening, Disp: 90 tablet, Rfl: 1    fluticasone (FLONASE) 50 mcg/act nasal spray, 1 spray into each nostril daily, Disp: 48 g, Rfl: 1    hydrOXYzine HCL (ATARAX) 25 mg tablet, Take 1 tablet (25 mg total) by mouth 3 (three) times a day as needed for anxiety, Disp: 270 tablet, Rfl: 1    metoprolol succinate (TOPROL-XL) 50 mg 24 hr tablet, Take 1 tablet (50 mg total) by mouth daily, Disp: 90 tablet, Rfl: 1    Multiple Vitamin (multivitamin) tablet, Take 1 tablet by mouth daily, Disp: , Rfl:     naproxen (NAPROSYN) 500 mg tablet, Take 1 tablet (500 mg total) by mouth 2 (two) times a day as needed for moderate pain, Disp: 180 tablet, Rfl: 1    Omega-3 Fatty Acids (fish oil) 1,000 mg, Take 2 capsules (2,000 mg total) by mouth daily, Disp: 60 capsule, Rfl: 11    omeprazole (PriLOSEC) 40 MG capsule, Take 1 capsule (40  "mg total) by mouth every evening, Disp: 90 capsule, Rfl: 1    Rosuvastatin Calcium 20 MG CPSP, Take 20 mg by mouth in the morning, Disp: 90 capsule, Rfl: 1    Semaglutide-Weight Management (Wegovy) 2.4 MG/0.75ML, Inject 2.4 mg under the skin weekly, Disp: 9 mL, Rfl: 1    valsartan (DIOVAN) 160 mg tablet, Take 1 tablet (160 mg total) by mouth daily, Disp: 90 tablet, Rfl: 1    zolpidem (AMBIEN) 10 mg tablet, Take 1 tablet (10 mg total) by mouth daily at bedtime as needed for sleep Patient has old pill bottle and it has 10mg 1 at bedtime for sleep, Disp: 30 tablet, Rfl: 0    Allergies   Allergen Reactions    Cat Dander     Pollen Extract        Physical Exam:    Ht 6' 4\" (1.93 m)   Wt 116 kg (256 lb)   BMI 31.16 kg/m²     Constitutional: normal, well developed, well nourished, alert, in no distress and non-toxic and no overt pain behavior.  Eyes: anicteric  HEENT: grossly intact  Neck: supple, symmetric, trachea midline and no masses   Pulmonary:even and unlabored  Cardiovascular:No edema or pitting edema present  Skin:Normal without rashes or lesions and well hydrated  Psychiatric:Mood and affect appropriate  Neurologic:Cranial Nerves II-XII grossly intact  Musculoskeletal:normal gait, pain to palpation over left medial periscapular region, subtle scapular asymmetry noted with wall pushups, intact UE strength and sensation to light touch      Imaging      LUMBAR SPINE     INDICATION:   Other disorder of circulatory system.      COMPARISON:  None.     VIEWS:  XR SPINE LUMBAR MINIMUM 4 VIEWS NON INJURY  Images: 6     FINDINGS:     There are 5 non rib bearing lumbar vertebral bodies.      There is no evidence of acute fracture or destructive osseous lesion.     Alignment is unremarkable.      Age-appropriate lumbar degenerative changes are seen.     The pedicles appear intact.     Soft tissues are unremarkable.     IMPRESSION:        No acute osseous abnormality.       Degenerative changes as described.    "

## 2025-02-06 ENCOUNTER — TELEPHONE (OUTPATIENT)
Age: 50
End: 2025-02-06

## 2025-02-06 DIAGNOSIS — E66.01 SEVERE OBESITY (HCC): ICD-10-CM

## 2025-02-06 RX ORDER — SEMAGLUTIDE 2.4 MG/.75ML
INJECTION, SOLUTION SUBCUTANEOUS
Qty: 9 ML | Refills: 0 | Status: SHIPPED | OUTPATIENT
Start: 2025-02-06 | End: 2025-08-06

## 2025-02-06 NOTE — TELEPHONE ENCOUNTER
Forwarding to PA team for review.   Wegovy 2.4 mg  Dr. Blackman    Patient called and states that the pharmacy told him that this needed a prior auth

## 2025-02-07 DIAGNOSIS — I26.99 PE (PULMONARY THROMBOEMBOLISM) (HCC): ICD-10-CM

## 2025-02-10 ENCOUNTER — TELEPHONE (OUTPATIENT)
Dept: INTERNAL MEDICINE CLINIC | Facility: CLINIC | Age: 50
End: 2025-02-10

## 2025-02-10 NOTE — TELEPHONE ENCOUNTER
Patient called requesting refill for Eliquis. Patient made aware medication was refilled on 02/28/25 for 180 with 0 refills to first [facility] pharmacy. Patient instructed to contact the pharmacy to obtain refills of medication. Patient verbalized understanding.

## 2025-02-10 NOTE — TELEPHONE ENCOUNTER
Duplicate encounter created, please see telephone encounter from 02/06/2025 regarding Wegovy 2.4 mg  PA status. Please review patient's chart to see if there is already an encounter regarding the medication in question and to document anything regarding this medication in regards to anything regarding the authorization process etc before creating another encounter Thank You.

## 2025-02-10 NOTE — TELEPHONE ENCOUNTER
Please have provider addend the 1/10 note with our smart phrase for weight loss. And state that diet and exercise was discussed with the patient.

## 2025-02-10 NOTE — TELEPHONE ENCOUNTER
Patient needs to schedule an in person follow up to discuss how he is doing on wegovy and get an updated weight. Last time patient was seen was 10/25/24. Once appt has been completed PA team will be able to submit PA for wegovy 2.4 mg

## 2025-02-11 ENCOUNTER — NURSE TRIAGE (OUTPATIENT)
Dept: OTHER | Facility: HOSPITAL | Age: 50
End: 2025-02-11

## 2025-02-11 PROBLEM — E66.9 OBESITY (BMI 30-39.9): Status: ACTIVE | Noted: 2024-04-01

## 2025-02-11 NOTE — TELEPHONE ENCOUNTER
Patient called the [facility] to find out an updated on PA for this medication. Notified patient no approval has been updated yet, patient verbalized understanding even though he doesn't know why this needed.

## 2025-02-12 ENCOUNTER — PROCEDURE VISIT (OUTPATIENT)
Dept: PAIN MEDICINE | Facility: CLINIC | Age: 50
End: 2025-02-12
Payer: COMMERCIAL

## 2025-02-12 VITALS
SYSTOLIC BLOOD PRESSURE: 128 MMHG | TEMPERATURE: 97.6 F | OXYGEN SATURATION: 99 % | HEART RATE: 80 BPM | DIASTOLIC BLOOD PRESSURE: 88 MMHG

## 2025-02-12 DIAGNOSIS — M79.18 MYOFASCIAL PAIN SYNDROME: Primary | ICD-10-CM

## 2025-02-12 PROCEDURE — 20552 NJX 1/MLT TRIGGER POINT 1/2: CPT | Performed by: STUDENT IN AN ORGANIZED HEALTH CARE EDUCATION/TRAINING PROGRAM

## 2025-02-12 PROCEDURE — 76942 ECHO GUIDE FOR BIOPSY: CPT | Performed by: STUDENT IN AN ORGANIZED HEALTH CARE EDUCATION/TRAINING PROGRAM

## 2025-02-12 RX ORDER — METHYLPREDNISOLONE ACETATE 40 MG/ML
40 INJECTION, SUSPENSION INTRA-ARTICULAR; INTRALESIONAL; INTRAMUSCULAR; SOFT TISSUE
Status: COMPLETED | OUTPATIENT
Start: 2025-02-12 | End: 2025-02-12

## 2025-02-12 RX ORDER — BUPIVACAINE HYDROCHLORIDE 2.5 MG/ML
2 INJECTION, SOLUTION EPIDURAL; INFILTRATION; INTRACAUDAL
Status: COMPLETED | OUTPATIENT
Start: 2025-02-12 | End: 2025-02-12

## 2025-02-12 RX ADMIN — BUPIVACAINE HYDROCHLORIDE 2 ML: 2.5 INJECTION, SOLUTION EPIDURAL; INFILTRATION; INTRACAUDAL at 14:00

## 2025-02-12 RX ADMIN — METHYLPREDNISOLONE ACETATE 40 MG: 40 INJECTION, SUSPENSION INTRA-ARTICULAR; INTRALESIONAL; INTRAMUSCULAR; SOFT TISSUE at 14:00

## 2025-02-12 NOTE — PROGRESS NOTES
" Universal Protocol:  Consent: Verbal consent obtained. Written consent obtained.  Risks and benefits: risks, benefits and alternatives were discussed  Consent given by: patient  Time out: Immediately prior to procedure a \"time out\" was called to verify the correct patient, procedure, equipment, support staff and site/side marked as required.  Patient understanding: patient states understanding of the procedure being performed  Patient consent: the patient's understanding of the procedure matches consent given  Procedure consent: procedure consent matches procedure scheduled  Site marked: the operative site was marked  Patient identity confirmed: verbally with patient  Supporting Documentation  Indications: pain   Trigger Point Injections: single/multiple trigger point(s): 1-2 muscle groups    Injection site identified by: ultrasound  Procedure Details  Location(s):    Middle Back: L lower trapezius     Prep: patient was prepped and draped in usual sterile fashion  Needle size: 25 G  Medications: 2 mL bupivacaine (PF) 0.25 %; 40 mg methylPREDNISolone acetate 40 mg/mL  Patient tolerance: patient tolerated the procedure well with no immediate complications          "

## 2025-02-15 ENCOUNTER — TELEPHONE (OUTPATIENT)
Dept: OTHER | Facility: OTHER | Age: 50
End: 2025-02-15

## 2025-02-17 ENCOUNTER — TELEPHONE (OUTPATIENT)
Age: 50
End: 2025-02-17

## 2025-02-17 NOTE — TELEPHONE ENCOUNTER
Patient called, checking status on Wegovy 2.5mg for insurance approval.  ( Has been denied 2/17/25)  Patient is very upset and wants to know the reason for the insurance denial of medication wegovy.    Patient states, on 1/10 /25 he was seen for office visit and at the visit weight and diet and exercise was discussed.      Patient states, he has been losing weight and doing all that he is suppose to do, with weight in and office visit and doesn't understand why insurance denied the medication.  He states he is suppose to take his next dose and is over due to take it.    Patient is requesting to speak to Dr. Laboy to please call him back tomorrow as soon as possible.    tim- [phone]

## 2025-02-19 NOTE — TELEPHONE ENCOUNTER
Please have provide send clinical documentation to us to support the appeal, or did he addend his last recent office note please clarify.

## 2025-02-25 DIAGNOSIS — J45.21 MILD INTERMITTENT ASTHMATIC BRONCHITIS WITH ACUTE EXACERBATION: ICD-10-CM

## 2025-02-25 RX ORDER — ALBUTEROL SULFATE 90 UG/1
1 INHALANT RESPIRATORY (INHALATION) EVERY 4 HOURS PRN
Qty: 25.5 G | Refills: 1 | Status: SHIPPED | OUTPATIENT
Start: 2025-02-25 | End: 2025-08-24

## 2025-02-26 ENCOUNTER — TELEPHONE (OUTPATIENT)
Dept: PAIN MEDICINE | Facility: CLINIC | Age: 50
End: 2025-02-26

## 2025-02-28 ENCOUNTER — TELEPHONE (OUTPATIENT)
Age: 50
End: 2025-02-28

## 2025-02-28 NOTE — TELEPHONE ENCOUNTER
Kaila from [employer] called to give an authorization to Wegovy.    PA #  [id number]    The authorization will be faxed as well.    Please advise, thank you.

## 2025-03-20 DIAGNOSIS — E66.01 SEVERE OBESITY (HCC): ICD-10-CM

## 2025-03-21 RX ORDER — SEMAGLUTIDE 2.4 MG/.75ML
INJECTION, SOLUTION SUBCUTANEOUS
Qty: 9 ML | Refills: 0 | Status: SHIPPED | OUTPATIENT
Start: 2025-03-21 | End: 2025-09-17

## 2025-03-26 ENCOUNTER — TELEPHONE (OUTPATIENT)
Dept: INTERNAL MEDICINE CLINIC | Facility: CLINIC | Age: 50
End: 2025-03-26

## 2025-03-26 NOTE — TELEPHONE ENCOUNTER
Call tomorrow health or whoever prescribed the CPAP equipment originally and find out type of CPAP machine and mask used by the patient

## 2025-03-26 NOTE — TELEPHONE ENCOUNTER
Pt states his cpap company told him the doctor needs to send script for cpap supplies to  Tomorrow [facility]. Pt does not have any info for tomorrow [facility] but states his insurance told him the doctor will know what to do.      Pt states he needs filters, tubing, mask, and mask straps    Please call pt with any questions

## 2025-04-04 ENCOUNTER — TELEPHONE (OUTPATIENT)
Age: 50
End: 2025-04-04

## 2025-04-04 NOTE — TELEPHONE ENCOUNTER
Patient called stating he had the [employer] testing done 8/2024    However, he wants a more in depth study if that is possible. He read on [facility] site that a person can learn more about ancestry and other traits like, caffeine sensitivity, sleep patterns, smoking and more.     Is there an additional test needed for this or can it be determined from the test he already had done     Please advise patient

## 2025-04-08 ENCOUNTER — OFFICE VISIT (OUTPATIENT)
Dept: OBGYN CLINIC | Facility: CLINIC | Age: 50
End: 2025-04-08
Payer: COMMERCIAL

## 2025-04-08 VITALS — HEIGHT: 76 IN | WEIGHT: 256 LBS | BODY MASS INDEX: 31.17 KG/M2

## 2025-04-08 DIAGNOSIS — M77.12 LATERAL EPICONDYLITIS OF LEFT ELBOW: ICD-10-CM

## 2025-04-08 DIAGNOSIS — M17.0 PRIMARY OSTEOARTHRITIS OF BOTH KNEES: Primary | ICD-10-CM

## 2025-04-08 PROCEDURE — 20551 NJX 1 TENDON ORIGIN/INSJ: CPT | Performed by: ORTHOPAEDIC SURGERY

## 2025-04-08 PROCEDURE — 99213 OFFICE O/P EST LOW 20 MIN: CPT | Performed by: ORTHOPAEDIC SURGERY

## 2025-04-08 PROCEDURE — 20610 DRAIN/INJ JOINT/BURSA W/O US: CPT | Performed by: ORTHOPAEDIC SURGERY

## 2025-04-08 RX ORDER — TRIAMCINOLONE ACETONIDE 40 MG/ML
80 INJECTION, SUSPENSION INTRA-ARTICULAR; INTRAMUSCULAR
Status: COMPLETED | OUTPATIENT
Start: 2025-04-08 | End: 2025-04-08

## 2025-04-08 RX ORDER — BUPIVACAINE HYDROCHLORIDE 2.5 MG/ML
3.5 INJECTION, SOLUTION INFILTRATION; PERINEURAL
Status: COMPLETED | OUTPATIENT
Start: 2025-04-08 | End: 2025-04-08

## 2025-04-08 RX ORDER — BUPIVACAINE HYDROCHLORIDE 2.5 MG/ML
1 INJECTION, SOLUTION INFILTRATION; PERINEURAL
Status: COMPLETED | OUTPATIENT
Start: 2025-04-08 | End: 2025-04-08

## 2025-04-08 RX ORDER — BETAMETHASONE SODIUM PHOSPHATE AND BETAMETHASONE ACETATE 3; 3 MG/ML; MG/ML
6 INJECTION, SUSPENSION INTRA-ARTICULAR; INTRALESIONAL; INTRAMUSCULAR; SOFT TISSUE
Status: COMPLETED | OUTPATIENT
Start: 2025-04-08 | End: 2025-04-08

## 2025-04-08 RX ADMIN — TRIAMCINOLONE ACETONIDE 80 MG: 40 INJECTION, SUSPENSION INTRA-ARTICULAR; INTRAMUSCULAR at 13:00

## 2025-04-08 RX ADMIN — BUPIVACAINE HYDROCHLORIDE 3.5 ML: 2.5 INJECTION, SOLUTION INFILTRATION; PERINEURAL at 13:00

## 2025-04-08 RX ADMIN — BUPIVACAINE HYDROCHLORIDE 1 ML: 2.5 INJECTION, SOLUTION INFILTRATION; PERINEURAL at 13:00

## 2025-04-08 RX ADMIN — BETAMETHASONE SODIUM PHOSPHATE AND BETAMETHASONE ACETATE 6 MG: 3; 3 INJECTION, SUSPENSION INTRA-ARTICULAR; INTRALESIONAL; INTRAMUSCULAR; SOFT TISSUE at 13:00

## 2025-04-08 NOTE — PROGRESS NOTES
:  Assessment & Plan  Primary osteoarthritis of both knees  The patient was seen and examined.  He is relatively asymptomatic with regards to his right knee however he states that he does have intermittent pain into the right knee.  Possible treatment options were discussed with his bilateral  knee.  His bilateral knees were injected with Kenalog and Marcaine.  He tolerated the injection quite well.  He will follow-up with our office in 3 months.  He is acceptable to this plan.    Return in about 3 months (around 7/8/2025) for Recheck.    The patient has symptomatic osteoarthritis of his bilateral knees.  Under aseptic technique, his bilateral  knees were reinjected with Kenalog and Marcaine.  He tolerated procedure quite well.  Return back in 3 months for reevaluation  Orders:    Large joint arthrocentesis: bilateral knee    Lateral epicondylitis of left elbow  Patient remains symptomatic with lateral epicondylitis of Left elbow. He was offered and accepted an injection(s) of celestone and marcaine to his Left elbow(s) for symptomatic relief of pain and inflammation. Patient tolerated the treatment(s) well. Ice and post injection protocol advised. Weightbearing activities as tolerated. He will be seen for follow-up in 3 months for re-evaluation. Patient expresses understanding and is in agreement with this treatment plan. The patient was given the opportunity to ask questions or present concerns.        The patient has lateral epicondylitis of his left elbow.  Under aseptic technique, the left elbow was injected with Celestone and Marcaine.  He tolerated the procedure well.  Return back in 3 months for reevaluation  Orders:    Hand/upper extremity injection        The patient has osteoarthritis of his bilateral knees and lateral epicondylitis of his left elbow.  Both knees were injected with Kenalog and Marcaine.  The elbow with Celestone and Marcaine.  He tolerated the procedures well.  Return back in 3 months for  reevaluation  _____________________________________________________  CHIEF COMPLAINT:  Chief Complaint   Patient presents with    Left Knee - Follow-up    Right Knee - Follow-up    Left Elbow - Follow-up         SUBJECTIVE:  Bro Roldan is a 49 y.o. male who presents to our office for a follow-up visit.  The patient has a history of primary osteoarthritis of both knees and lateral epicondylitis of left elbow.  Last visit, he was given a corticosteroid injection to his left elbow, and was seen last on 10/8/24 regarding left knee osteoarthritis.  He states that his left knee is still giving him pain, however, his right knee does has intermittent pain. Patient states that he is also still experiencing pain into the left elbow.  He denies any numbness or tingling.  He denies any fever or chills.    The following portions of the patient's history were reviewed and updated as appropriate: allergies, current medications, past family history, past medical history, past social history, past surgical history and problem list.    PAST MEDICAL HISTORY:  Past Medical History:   Diagnosis Date    Allergic     Anxiety     Arthritis     Asthma     Depression     GERD (gastroesophageal reflux disease)     Hypertension     Inflammatory bowel disease     Obesity     Visual impairment        PAST SURGICAL HISTORY:  Past Surgical History:   Procedure Laterality Date    EYE SURGERY      2021/2022    KNEE SURGERY  2006       FAMILY HISTORY:  Family History   Problem Relation Age of Onset    Diabetes Maternal Grandmother     Diabetes Maternal Grandfather     Cancer Paternal Grandfather        SOCIAL HISTORY:  Social History     Tobacco Use    Smoking status: Former     Passive exposure: Never    Smokeless tobacco: Never   Vaping Use    Vaping status: Never Used   Substance Use Topics    Alcohol use: Not Currently     Comment: rare    Drug use: Not Currently     Types: Marijuana     Comment: Medical Marijuana       MEDICATIONS:    Current  Outpatient Medications:     albuterol (PROVENTIL HFA,VENTOLIN HFA) 90 mcg/act inhaler, INHALE 1 PUFF EVERY 4 (FOUR) HOURS AS NEEDED FOR WHEEZING OR SHORTNESS OF BREATH, Disp: 25.5 g, Rfl: 1    amphetamine-dextroamphetamine (ADDERALL) 20 mg tablet, Take 0.5 tablets (10 mg total) by mouth 3 (three) times a day Max Daily Amount: 30 mg, Disp: 90 tablet, Rfl: 0    apixaban (Eliquis) 5 mg, Take 1 tablet (5 mg total) by mouth 2 (two) times a day, Disp: 180 tablet, Rfl: 0    cetirizine (ZyrTEC) 10 mg tablet, Take 1 tablet (10 mg total) by mouth every morning, Disp: 90 tablet, Rfl: 1    diltiazem (CARDIZEM CD) 180 mg 24 hr capsule, Take 1 capsule (180 mg total) by mouth daily, Disp: 90 capsule, Rfl: 1    dorzolamide-timolol (COSOPT) 2-0.5 % ophthalmic solution, Administer 1 drop to both eyes 2 (two) times a day, Disp: , Rfl:     famotidine (PEPCID) 40 MG tablet, Take 1 tablet (40 mg total) by mouth every evening, Disp: 90 tablet, Rfl: 1    fluticasone (FLONASE) 50 mcg/act nasal spray, 1 spray into each nostril daily, Disp: 48 g, Rfl: 1    hydrOXYzine HCL (ATARAX) 25 mg tablet, Take 1 tablet (25 mg total) by mouth 3 (three) times a day as needed for anxiety, Disp: 270 tablet, Rfl: 1    metoprolol succinate (TOPROL-XL) 50 mg 24 hr tablet, Take 1 tablet (50 mg total) by mouth daily, Disp: 90 tablet, Rfl: 1    Multiple Vitamin (multivitamin) tablet, Take 1 tablet by mouth daily, Disp: , Rfl:     naproxen (NAPROSYN) 500 mg tablet, Take 1 tablet (500 mg total) by mouth 2 (two) times a day as needed for moderate pain, Disp: 180 tablet, Rfl: 1    omeprazole (PriLOSEC) 40 MG capsule, Take 1 capsule (40 mg total) by mouth every evening, Disp: 90 capsule, Rfl: 1    Rosuvastatin Calcium 20 MG CPSP, Take 20 mg by mouth in the morning, Disp: 90 capsule, Rfl: 1    Semaglutide-Weight Management (Wegovy) 2.4 MG/0.75ML, Inject 2.4 mg under the skin weekly, Disp: 9 mL, Rfl: 0    valsartan (DIOVAN) 160 mg tablet, Take 1 tablet (160 mg total) by  "mouth daily, Disp: 90 tablet, Rfl: 1    Omega-3 Fatty Acids (fish oil) 1,000 mg, Take 2 capsules (2,000 mg total) by mouth daily, Disp: 60 capsule, Rfl: 11    zolpidem (AMBIEN) 10 mg tablet, Take 1 tablet (10 mg total) by mouth daily at bedtime as needed for sleep Patient has old pill bottle and it has 10mg 1 at bedtime for sleep, Disp: 30 tablet, Rfl: 0    ALLERGIES:  Allergies   Allergen Reactions    Cat Dander     Pollen Extract        ROS:  Review of Systems     Constitutional: Negative for fatigue, fever or loss of appetite.   HENT: Negative.    Respiratory: Negative for shortness of breath, dyspnea.    Cardiovascular: Negative for chest pain/tightness.   Gastrointestinal: Negative for abdominal pain, N/V.   Endocrine: Negative for cold/heat intolerance, unexplained weight loss/gain.   Genitourinary: Negative for flank pain, dysuria, hematuria.   Musculoskeletal: Positive for arthralgia   Skin: Negative for rash.    Neurological: Negative for numbness or tingling  Psychiatric/Behavioral: Negative for agitation.  _____________________________________________________  PHYSICAL EXAMINATION:    Height 6' 4\" (1.93 m), weight 116 kg (256 lb).    Constitutional: Oriented to person, place, and time. Appears well-developed and well-nourished. No distress.   HENT:   Head: Normocephalic.   Eyes: Conjunctivae are normal. Right eye exhibits no discharge. Left eye exhibits no discharge. No scleral icterus.   Cardiovascular: Normal rate.    Pulmonary/Chest: Effort normal.   Neurological: Alert and oriented to person, place, and time.   Skin: Skin is warm and dry. No rash noted. Not diaphoretic. No erythema. No pallor.   Psychiatric: Normal mood and affect. Behavior is normal. Judgment and thought content normal.      MUSCULOSKELETAL EXAMINATION:   Physical Exam  Constitutional:       Appearance: Normal appearance. He is normal weight.   HENT:      Nose: Nose normal.      Mouth/Throat:      Mouth: Mucous membranes are moist. " "     Pharynx: Oropharynx is clear.   Cardiovascular:      Rate and Rhythm: Normal rate.   Pulmonary:      Effort: Pulmonary effort is normal.   Musculoskeletal:         General: Normal range of motion.      Cervical back: Normal range of motion.   Skin:     General: Skin is warm and dry.      Capillary Refill: Capillary refill takes less than 2 seconds.   Neurological:      General: No focal deficit present.      Mental Status: He is alert and oriented to person, place, and time. Mental status is at baseline.   Psychiatric:         Mood and Affect: Mood normal.         Behavior: Behavior normal.         Thought Content: Thought content normal.         Judgment: Judgment normal.           Ortho Exam    Bilateral lower extremities are neurovascularly intact  Toes are pink and mobile   Compartments are soft  No warmth, erythema or ecchymosis  ROM of knees are from 5-115 degrees  Negative Lachman, drawer or pivot shift  No medial instability  Medial joint line tenderness, slight lateral joint line tenderness  Patellofemoral crepitation       Objective:  BP Readings from Last 1 Encounters:   02/12/25 128/88      Wt Readings from Last 1 Encounters:   04/08/25 116 kg (256 lb)        BMI:   Estimated body mass index is 31.16 kg/m² as calculated from the following:    Height as of this encounter: 6' 4\" (1.93 m).    Weight as of this encounter: 116 kg (256 lb).      PROCEDURES PERFORMED:  Large joint arthrocentesis: bilateral knee  Universal Protocol:  procedure performed by consultantConsent: Verbal consent obtained.  Risks and benefits: risks, benefits and alternatives were discussed  Consent given by: patient  Patient understanding: patient states understanding of the procedure being performed  Site marked: the operative site was marked  Radiology Images displayed and confirmed. If images not available, report reviewed: imaging studies available  Patient identity confirmed: verbally with patient  Supporting " Documentation  Indications: pain and joint swelling   Procedure Details  Location: knee - bilateral knee  Needle gauge: 21 G.  Ultrasound guidance: no  Approach: lateral    Medications (Right): 3.5 mL bupivacaine 0.25 %; 80 mg triamcinolone acetonide 40 mg/mLMedications (Left): 3.5 mL bupivacaine 0.25 %; 80 mg triamcinolone acetonide 40 mg/mL   Patient tolerance: patient tolerated the procedure well with no immediate complications  Dressing:  Sterile dressing applied      Hand/upper extremity injection  Universal Protocol:  procedure performed by consultantConsent: Verbal consent obtained.  Risks and benefits: risks, benefits and alternatives were discussed  Consent given by: patient  Patient understanding: patient states understanding of the procedure being performed  Patient identity confirmed: verbally with patient  Supporting Documentation  Indications: therapeutic, pain and tendon swelling   Procedure Details  Condition:lateral epicondylitis Needle size: 25 G  Ultrasound guidance: no  Approach: lateral  Medications administered: 1 mL bupivacaine 0.25 %; 6 mg betamethasone acetate-betamethasone sodium phosphate 6 (3-3) mg/mL             Scribe Attestation      I,:  Daniella Shin am acting as a scribe while in the presence of the attending physician.:       I,:  Abdiel Herbert DO personally performed the services described in this documentation    as scribed in my presence.:

## 2025-04-18 DIAGNOSIS — E66.01 SEVERE OBESITY (HCC): ICD-10-CM

## 2025-04-19 DIAGNOSIS — E66.01 SEVERE OBESITY (HCC): ICD-10-CM

## 2025-04-20 RX ORDER — SEMAGLUTIDE 2.4 MG/.75ML
INJECTION, SOLUTION SUBCUTANEOUS
Qty: 9 ML | Refills: 0 | Status: SHIPPED | OUTPATIENT
Start: 2025-04-20 | End: 2025-10-15

## 2025-04-21 RX ORDER — SEMAGLUTIDE 2.4 MG/.75ML
2.4 INJECTION, SOLUTION SUBCUTANEOUS WEEKLY
Qty: 3 ML | Refills: 0 | Status: SHIPPED | OUTPATIENT
Start: 2025-04-21

## 2025-04-24 ENCOUNTER — OFFICE VISIT (OUTPATIENT)
Dept: CARDIOLOGY CLINIC | Facility: HOSPITAL | Age: 50
End: 2025-04-24
Payer: COMMERCIAL

## 2025-04-24 VITALS
SYSTOLIC BLOOD PRESSURE: 140 MMHG | OXYGEN SATURATION: 95 % | DIASTOLIC BLOOD PRESSURE: 84 MMHG | HEIGHT: 76 IN | BODY MASS INDEX: 30.49 KG/M2 | WEIGHT: 250.4 LBS | HEART RATE: 94 BPM

## 2025-04-24 DIAGNOSIS — R00.2 PALPITATIONS: ICD-10-CM

## 2025-04-24 DIAGNOSIS — E78.00 HYPERCHOLESTEROLEMIA: ICD-10-CM

## 2025-04-24 DIAGNOSIS — I10 ESSENTIAL HYPERTENSION: ICD-10-CM

## 2025-04-24 DIAGNOSIS — I26.99 PE (PULMONARY THROMBOEMBOLISM) (HCC): Primary | ICD-10-CM

## 2025-04-24 PROCEDURE — 99213 OFFICE O/P EST LOW 20 MIN: CPT | Performed by: INTERNAL MEDICINE

## 2025-04-24 NOTE — PROGRESS NOTES
Cardiology Follow Up    Bro Roldan  1975  [id number]  [facility]  [address]  [place] [address] [phone]    1. PE (pulmonary thromboembolism) (HCC)        2. Essential hypertension        3. Hypercholesterolemia        4. Palpitations              Discussion/Summary: All of his assessed cardiac problems are stable. I have reviewed his medications and made no changes. No further cardiac testing is recommended.  RTO as needed.      Interval History: He is back to full activity and denies CP, SOB, LE edema, palpitations. CT chest with contrast on 12/14/2025 showed to thoracic aneurysm.  His weight is down from 292 to 250 lbs.     / 84 . It is usually much better.      Patient Active Problem List   Diagnosis    Asthma, currently inactive    Essential hypertension    Gastroesophageal reflux disease    Hypercholesterolemia    Lumbar spine pain    Obesity (BMI 30-39.9)    Arthralgia of both knees    Attention deficit hyperactivity disorder (ADHD), predominantly inattentive type    Peripheral edema    JORGE (obstructive sleep apnea)    PE (pulmonary thromboembolism) (HCC)    Erythrocytosis    Palpitations     Past Medical History:   Diagnosis Date    Allergic     Anxiety     Arthritis     Asthma     Depression     GERD (gastroesophageal reflux disease)     Hypertension     Inflammatory bowel disease     Obesity     Visual impairment      Social History     Socioeconomic History    Marital status: Single     Spouse name: Not on file    Number of children: Not on file    Years of education: Not on file    Highest education level: Not on file   Occupational History    Not on file   Tobacco Use    Smoking status: Former     Passive exposure: Never    Smokeless tobacco: Never   Vaping Use    Vaping status: Never Used   Substance and Sexual Activity    Alcohol use: Not Currently     Comment: rare    Drug use: Not  Currently     Types: Marijuana     Comment: Medical Marijuana    Sexual activity: Not Currently     Partners: Female   Other Topics Concern    Not on file   Social History Narrative    Not on file     Social Drivers of Health     Financial Resource Strain: Not on file   Food Insecurity: No Food Insecurity (8/9/2024)    Nursing - Inadequate Food Risk Classification     Worried About Running Out of Food in the Last Year: Never true     Ran Out of Food in the Last Year: Never true     Ran Out of Food in the Last Year: Not on file   Transportation Needs: No Transportation Needs (8/9/2024)    PRAPARE - Transportation     Lack of Transportation (Medical): No     Lack of Transportation (Non-Medical): No   Physical Activity: Not on file   Stress: Not on file   Social Connections: Unknown (6/18/2024)    Received from [facility]     How often do you feel lonely or isolated from those around you? (Adult - for ages 18 years and over): Not on file   Intimate Partner Violence: Not on file   Housing Stability: Low Risk  (8/9/2024)    Housing Stability Vital Sign     Unable to Pay for Housing in the Last Year: No     Number of Times Moved in the Last Year: 0     Homeless in the Last Year: No      Family History   Problem Relation Age of Onset    Diabetes Maternal Grandmother     Diabetes Maternal Grandfather     Cancer Paternal Grandfather      Past Surgical History:   Procedure Laterality Date    EYE SURGERY      2021/2022    KNEE SURGERY  2006       Current Outpatient Medications:     albuterol (PROVENTIL HFA,VENTOLIN HFA) 90 mcg/act inhaler, INHALE 1 PUFF EVERY 4 (FOUR) HOURS AS NEEDED FOR WHEEZING OR SHORTNESS OF BREATH, Disp: 25.5 g, Rfl: 1    amphetamine-dextroamphetamine (ADDERALL) 20 mg tablet, Take 0.5 tablets (10 mg total) by mouth 3 (three) times a day Max Daily Amount: 30 mg, Disp: 90 tablet, Rfl: 0    apixaban (Eliquis) 5 mg, Take 1 tablet (5 mg total) by mouth 2 (two) times a day, Disp: 180  tablet, Rfl: 0    cetirizine (ZyrTEC) 10 mg tablet, Take 1 tablet (10 mg total) by mouth every morning, Disp: 90 tablet, Rfl: 1    diltiazem (CARDIZEM CD) 180 mg 24 hr capsule, Take 1 capsule (180 mg total) by mouth daily, Disp: 90 capsule, Rfl: 1    dorzolamide-timolol (COSOPT) 2-0.5 % ophthalmic solution, Administer 1 drop to both eyes 2 (two) times a day, Disp: , Rfl:     famotidine (PEPCID) 40 MG tablet, Take 1 tablet (40 mg total) by mouth every evening, Disp: 90 tablet, Rfl: 1    fluticasone (FLONASE) 50 mcg/act nasal spray, 1 spray into each nostril daily, Disp: 48 g, Rfl: 1    hydrOXYzine HCL (ATARAX) 25 mg tablet, Take 1 tablet (25 mg total) by mouth 3 (three) times a day as needed for anxiety, Disp: 270 tablet, Rfl: 1    metoprolol succinate (TOPROL-XL) 50 mg 24 hr tablet, Take 1 tablet (50 mg total) by mouth daily, Disp: 90 tablet, Rfl: 1    Multiple Vitamin (multivitamin) tablet, Take 1 tablet by mouth daily, Disp: , Rfl:     naproxen (NAPROSYN) 500 mg tablet, Take 1 tablet (500 mg total) by mouth 2 (two) times a day as needed for moderate pain, Disp: 180 tablet, Rfl: 1    Omega-3 Fatty Acids (fish oil) 1,000 mg, Take 2 capsules (2,000 mg total) by mouth daily, Disp: 60 capsule, Rfl: 11    omeprazole (PriLOSEC) 40 MG capsule, Take 1 capsule (40 mg total) by mouth every evening, Disp: 90 capsule, Rfl: 1    Rosuvastatin Calcium 20 MG CPSP, Take 20 mg by mouth in the morning, Disp: 90 capsule, Rfl: 1    Semaglutide-Weight Management (Wegovy) 2.4 MG/0.75ML, Inject 2.4 mg under the skin weekly, Disp: 9 mL, Rfl: 0    valsartan (DIOVAN) 160 mg tablet, Take 1 tablet (160 mg total) by mouth daily, Disp: 90 tablet, Rfl: 1    Wegovy 2.4 MG/0.75ML, INJECT 2.4 MG UNDER THE SKIN WEEKLY, Disp: 3 mL, Rfl: 0    zolpidem (AMBIEN) 10 mg tablet, Take 1 tablet (10 mg total) by mouth daily at bedtime as needed for sleep Patient has old pill bottle and it has 10mg 1 at bedtime for sleep, Disp: 30 tablet, Rfl: 0  Allergies  "  Allergen Reactions    Cat Dander     Pollen Extract      Vitals:    04/24/25 1239   BP: 140/84   Pulse: 94   SpO2: 95%   Weight: 114 kg (250 lb 6.4 oz)   Height: 6' 4\" (1.93 m)     Weight (last 2 days)       Date/Time Weight    04/24/25 1239 114 (250.4)           Blood pressure 140/84, pulse 94, height 6' 4\" (1.93 m), weight 114 kg (250 lb 6.4 oz), SpO2 95%., Body mass index is 30.48 kg/m².    Labs:  Admission on 12/14/2024, Discharged on 12/14/2024   Component Date Value    WBC 12/14/2024 7.17     RBC 12/14/2024 4.77     Hemoglobin 12/14/2024 15.0     Hematocrit 12/14/2024 44.8     MCV 12/14/2024 94     MCH 12/14/2024 31.4     MCHC 12/14/2024 33.5     RDW 12/14/2024 12.7     MPV 12/14/2024 9.7     Platelets 12/14/2024 252     nRBC 12/14/2024 0     Segmented % 12/14/2024 52     Immature Grans % 12/14/2024 0     Lymphocytes % 12/14/2024 38     Monocytes % 12/14/2024 9     Eosinophils Relative 12/14/2024 1     Basophils Relative 12/14/2024 0     Absolute Neutrophils 12/14/2024 3.74     Absolute Immature Grans 12/14/2024 0.03     Absolute Lymphocytes 12/14/2024 2.70     Absolute Monocytes 12/14/2024 0.61     Eosinophils Absolute 12/14/2024 0.06     Basophils Absolute 12/14/2024 0.03     Sodium 12/14/2024 137     Potassium 12/14/2024 3.8     Chloride 12/14/2024 103     CO2 12/14/2024 28     ANION GAP 12/14/2024 6     BUN 12/14/2024 17     Creatinine 12/14/2024 1.01     Glucose 12/14/2024 87     Calcium 12/14/2024 9.8     eGFR 12/14/2024 86     hs TnI 0hr 12/14/2024 4     Ventricular Rate 12/14/2024 74     Atrial Rate 12/14/2024 74     NE Interval 12/14/2024 204     QRSD Interval 12/14/2024 92     QT Interval 12/14/2024 378     QTC Interval 12/14/2024 419     P Axis 12/14/2024 47     QRS Axis 12/14/2024 52     T Wave Axis 12/14/2024 45     Ventricular Rate 12/14/2024 68     Atrial Rate 12/14/2024 68     NE Interval 12/14/2024 196     QRSD Interval 12/14/2024 98     QT Interval 12/14/2024 400     QTC Interval 12/14/2024 " 425     P [place] 12/14/2024 37     QRS Axis 12/14/2024 58     T Wave [place] 12/14/2024 47      Imaging: No results found.    Review of Systems:  Review of Systems   Constitutional:  Negative for diaphoresis, fatigue, fever and unexpected weight change.   HENT: Negative.     Respiratory:  Negative for cough, shortness of breath and wheezing.    Cardiovascular:  Negative for chest pain, palpitations and leg swelling.   Gastrointestinal:  Negative for abdominal pain, diarrhea and nausea.   Musculoskeletal:  Negative for gait problem and myalgias.   Skin:  Negative for rash.   Neurological:  Negative for dizziness and numbness.   Psychiatric/Behavioral: Negative.         Physical Exam:  Physical Exam  Constitutional:       Appearance: He is well-developed.   HENT:      Head: Normocephalic and atraumatic.   Eyes:      Pupils: Pupils are equal, round, and reactive to light.   Neck:      Vascular: No JVD.   Cardiovascular:      Rate and Rhythm: Regular rhythm.      Pulses: Normal pulses.           Carotid pulses are 2+ on the right side and 2+ on the left side.     Heart sounds: S1 normal and S2 normal.   Pulmonary:      Effort: Pulmonary effort is normal.      Breath sounds: Normal breath sounds. No wheezing or rales.   Abdominal:      General: Bowel sounds are normal.      Palpations: Abdomen is soft.   Musculoskeletal:         General: No tenderness. Normal range of motion.      Cervical back: Normal range of motion and neck supple.   Skin:     General: Skin is warm.   Neurological:      Mental Status: He is alert and oriented to person, place, and time.      Cranial Nerves: No cranial nerve deficit.      Deep Tendon Reflexes: Reflexes are normal and symmetric.

## 2025-04-30 DIAGNOSIS — J32.9 SINOBRONCHITIS: ICD-10-CM

## 2025-04-30 DIAGNOSIS — J30.1 SEASONAL ALLERGIC RHINITIS DUE TO POLLEN: ICD-10-CM

## 2025-04-30 DIAGNOSIS — J40 SINOBRONCHITIS: ICD-10-CM

## 2025-04-30 DIAGNOSIS — G47.00 INSOMNIA, UNSPECIFIED TYPE: ICD-10-CM

## 2025-05-01 RX ORDER — ZOLPIDEM TARTRATE 10 MG/1
10 TABLET ORAL
Qty: 30 TABLET | Refills: 0 | Status: SHIPPED | OUTPATIENT
Start: 2025-05-01 | End: 2025-05-31

## 2025-05-01 RX ORDER — FLUTICASONE PROPIONATE 50 MCG
1 SPRAY, SUSPENSION (ML) NASAL DAILY
Qty: 48 G | Refills: 0 | OUTPATIENT
Start: 2025-05-01 | End: 2025-10-28

## 2025-05-01 RX ORDER — FLUTICASONE PROPIONATE 50 MCG
1 SPRAY, SUSPENSION (ML) NASAL DAILY
Qty: 48 G | Refills: 1 | Status: SHIPPED | OUTPATIENT
Start: 2025-05-01 | End: 2025-10-28

## 2025-05-01 RX ORDER — CETIRIZINE HYDROCHLORIDE 10 MG/1
10 TABLET ORAL EVERY MORNING
Qty: 90 TABLET | Refills: 1 | Status: SHIPPED | OUTPATIENT
Start: 2025-05-01 | End: 2025-10-28

## 2025-05-01 RX ORDER — CETIRIZINE HYDROCHLORIDE 10 MG/1
10 TABLET ORAL EVERY MORNING
Qty: 90 TABLET | Refills: 0 | OUTPATIENT
Start: 2025-05-01 | End: 2025-10-28

## 2025-05-02 ENCOUNTER — TELEPHONE (OUTPATIENT)
Age: 50
End: 2025-05-02

## 2025-05-02 NOTE — TELEPHONE ENCOUNTER
Phone call from Lizzy stating she will be faxing over a request for patients cpap supplies ( cpap tubing, mask,mask strap & cpap filter).  If you have any questions she can be reached at [phone]

## 2025-07-03 DIAGNOSIS — F41.9 ANXIETY: ICD-10-CM

## 2025-07-03 DIAGNOSIS — I10 ESSENTIAL HYPERTENSION: ICD-10-CM

## 2025-07-06 RX ORDER — VALSARTAN 160 MG/1
160 TABLET ORAL DAILY
Qty: 90 TABLET | Refills: 1 | Status: SHIPPED | OUTPATIENT
Start: 2025-07-06

## 2025-07-06 RX ORDER — METOPROLOL SUCCINATE 50 MG/1
50 TABLET, EXTENDED RELEASE ORAL DAILY
Qty: 90 TABLET | Refills: 1 | Status: SHIPPED | OUTPATIENT
Start: 2025-07-06

## 2025-07-06 RX ORDER — HYDROXYZINE HYDROCHLORIDE 25 MG/1
25 TABLET, FILM COATED ORAL 3 TIMES DAILY PRN
Qty: 270 TABLET | Refills: 1 | Status: SHIPPED | OUTPATIENT
Start: 2025-07-06 | End: 2026-01-02

## 2025-07-08 VITALS — HEIGHT: 76 IN | BODY MASS INDEX: 30.44 KG/M2 | WEIGHT: 250 LBS

## 2025-07-08 DIAGNOSIS — M77.12 LATERAL EPICONDYLITIS OF LEFT ELBOW: ICD-10-CM

## 2025-07-08 DIAGNOSIS — M17.0 PRIMARY OSTEOARTHRITIS OF BOTH KNEES: Primary | ICD-10-CM

## 2025-07-08 PROCEDURE — 20551 NJX 1 TENDON ORIGIN/INSJ: CPT | Performed by: ORTHOPAEDIC SURGERY

## 2025-07-08 PROCEDURE — 20610 DRAIN/INJ JOINT/BURSA W/O US: CPT | Performed by: ORTHOPAEDIC SURGERY

## 2025-07-08 PROCEDURE — 99213 OFFICE O/P EST LOW 20 MIN: CPT | Performed by: ORTHOPAEDIC SURGERY

## 2025-07-08 RX ORDER — BETAMETHASONE SODIUM PHOSPHATE AND BETAMETHASONE ACETATE 3; 3 MG/ML; MG/ML
6 INJECTION, SUSPENSION INTRA-ARTICULAR; INTRALESIONAL; INTRAMUSCULAR; SOFT TISSUE
Status: COMPLETED | OUTPATIENT
Start: 2025-07-08 | End: 2025-07-08

## 2025-07-08 RX ORDER — TRIAMCINOLONE ACETONIDE 40 MG/ML
80 INJECTION, SUSPENSION INTRA-ARTICULAR; INTRAMUSCULAR
Status: COMPLETED | OUTPATIENT
Start: 2025-07-08 | End: 2025-07-08

## 2025-07-08 RX ORDER — BUPIVACAINE HYDROCHLORIDE 2.5 MG/ML
1 INJECTION, SOLUTION INFILTRATION; PERINEURAL
Status: COMPLETED | OUTPATIENT
Start: 2025-07-08 | End: 2025-07-08

## 2025-07-08 RX ORDER — BUPIVACAINE HYDROCHLORIDE 2.5 MG/ML
4 INJECTION, SOLUTION INFILTRATION; PERINEURAL
Status: COMPLETED | OUTPATIENT
Start: 2025-07-08 | End: 2025-07-08

## 2025-07-08 RX ADMIN — BETAMETHASONE SODIUM PHOSPHATE AND BETAMETHASONE ACETATE 6 MG: 3; 3 INJECTION, SUSPENSION INTRA-ARTICULAR; INTRALESIONAL; INTRAMUSCULAR; SOFT TISSUE at 13:00

## 2025-07-08 RX ADMIN — BUPIVACAINE HYDROCHLORIDE 1 ML: 2.5 INJECTION, SOLUTION INFILTRATION; PERINEURAL at 13:00

## 2025-07-08 RX ADMIN — BUPIVACAINE HYDROCHLORIDE 4 ML: 2.5 INJECTION, SOLUTION INFILTRATION; PERINEURAL at 13:00

## 2025-07-08 RX ADMIN — TRIAMCINOLONE ACETONIDE 80 MG: 40 INJECTION, SUSPENSION INTRA-ARTICULAR; INTRAMUSCULAR at 13:00

## 2025-07-08 NOTE — PROGRESS NOTES
Assessment & Plan  Primary osteoarthritis of both knees    Orders:    Large joint arthrocentesis: bilateral knee  The patient has a history of osteoarthritis of the bilateral knees. An injection(s) of Kenalog and Marcaine was performed to his Bilateral knee(s) for symptomatic relief of pain and inflammation. Patient tolerated the treatment(s) well. Ice and post injection protocol advised. Weightbearing activities as tolerated. He will be seen for follow-up in 3 months for re-evaluation and consideration for repeat injections as necessary. Patient expresses understanding and is in agreement with this treatment plan. The patient was given the opportunity to ask questions or present concerns.  Lateral epicondylitis of left elbow    Orders:    Hand/upper extremity injection  The patient continues to experience lateral epicondylitis of his left elbow. Discussed with the patient that if his symptoms continue, we will proceed with an MRI. An injection(s) of Celestone and Marcaine was performed to his Left elbow(s) for symptomatic relief of pain and inflammation. Patient tolerated the treatment(s) well. Ice and post injection protocol advised. Weightbearing activities as tolerated. He will be seen for follow-up in 3 months for re-evaluation and consideration for repeat injections as necessary. Patient expresses understanding and is in agreement with this treatment plan. The patient was given the opportunity to ask questions or present concerns.    The patient has lateral condyle lightest of his left elbow as well as degenerative joint disease of his bilateral knees.  The former was injected with Celestone and Marcaine.  The latter with Kenalog and Marcaine.  He tolerated the procedures well.  Return back 3 months for evaluation    Subjective:   Patient ID: Bro Roldan  1975     HPI  Patient is a 49 y.o. male who presents for follow-up evaluation of his bilateral knees and left elbow. The patient was last seen on  "4/8/2025 where he received CS injections to the bilateral knee for treatment of OA. The patient states that the injections provided relief. He experiences pain with weightbearing and stairs. The patient also has a history of lateral epicondylitis of the left elbow. He states that his last injection only lasted approximately 1.5 months. He states that the pain is becoming more severe. He describes sharp pain with repetitive activities.     The following portions of the patient's history were reviewed and updated as appropriate:  Past medical history, past surgical history, Family history, social history, current medications and allergies    Past Medical History[1]    Past Surgical History[2]    Family History[3]    Social History[4]    Current Medications[5]    Allergies   Allergen Reactions    Cat Dander     Pollen Extract        Review of Systems   Constitutional:  Negative for chills and fever.   HENT:  Negative for ear pain and sore throat.    Eyes:  Negative for pain and visual disturbance.   Respiratory:  Negative for cough and shortness of breath.    Cardiovascular:  Negative for chest pain and palpitations.   Gastrointestinal:  Negative for abdominal pain and vomiting.   Genitourinary:  Negative for dysuria and hematuria.   Musculoskeletal:  Negative for arthralgias and back pain.   Skin:  Negative for color change and rash.   Neurological:  Negative for seizures and syncope.   All other systems reviewed and are negative.       Objective:  Ht 6' 4\" (1.93 m)   Wt 113 kg (250 lb)   BMI 30.43 kg/m²     Ortho Exam  bilateral knee(s) -   Patient ambulates with steady gait pattern  Uses No assistive device  No anatomical deformity  Skin is warm and dry to touch with no signs of erythema, ecchymosis, or infection   No soft tissue swelling or effusion noted  ROM (0° - 115°)   Strength: 5/5 throughout  TTP over medial joint line, TTP over lateral joint line  Flexor and extensor mechanisms are intact   Knee is stable " to varus and valgus stress  - Lachman's  - Anterior Drawer, - Posterior Drawer  - Nilesh's  Patella tracks centrally with palpable crepitus  Calf compartments are soft and supple  - John's sign  2+ DP and PT pulses with brisk capillary refill to the toes  Sural, saphenous, tibial, superficial, and deep peroneal motor and sensory distributions intact  Sensation light touch intact distally    left Elbow -   No anatomical deformity  Skin is warm and dry to touch with no signs of erythema, ecchymosis, or infection   No soft tissue swelling or effusion noted  No palpable crepitus with passive motion  TTP lateral epicondyle  ROM: flexion 140°, extension 0°, pronation 90°,  and supination 90°  Strength: 5/5 throughout  No varus laxity, No valgus laxity  Demonstrates normal shoulder, wrist, and finger motion  No radial head instability  No ulnar nerve subluxation  2+ distal radial pulse with brisk capillary refill to the fingers  Radial, median, and ulnar motor and sensory distrubution intact  Sensation to light touch intact distally       Physical Exam  HENT:      Head: Normocephalic and atraumatic.      Nose: Nose normal.     Eyes:      Conjunctiva/sclera: Conjunctivae normal.       Cardiovascular:      Rate and Rhythm: Normal rate.   Pulmonary:      Effort: Pulmonary effort is normal.     Musculoskeletal:      Cervical back: Neck supple.     Skin:     General: Skin is warm and dry.      Capillary Refill: Capillary refill takes less than 2 seconds.     Neurological:      Mental Status: He is alert and oriented to person, place, and time.     Psychiatric:         Mood and Affect: Mood normal.         Behavior: Behavior normal.          Diagnostic Test Review:  No new imaging at time of visit.     Large joint arthrocentesis: bilateral knee    Performed by: Abdiel Herbert DO  Authorized by: Abdiel Herbert DO    Universal Protocol:  procedure performed by consultantConsent: Verbal consent obtained  Risks and  benefits: risks, benefits and alternatives were discussed  Consent given by: patient  Timeout called at: 7/8/2025 1:27 PM.  Patient understanding: patient states understanding of the procedure being performed  Patient consent: the patient's understanding of the procedure matches consent given  Site marked: the operative site was marked  Radiology Images displayed and confirmed. If images not available, report reviewed: imaging studies available  Patient identity confirmed: verbally with patient  Supporting Documentation  Indications: pain and joint swelling     Is this a Visco injection? NoProcedure Details  Location: knee - bilateral knee  Ultrasound guidance: no  Approach: anterolateral    Medications (Right): 4 mL bupivacaine 0.25 %; 80 mg triamcinolone acetonide 40 mg/mLMedications (Left): 4 mL bupivacaine 0.25 %; 80 mg triamcinolone acetonide 40 mg/mL   Patient tolerance: patient tolerated the procedure well with no immediate complications  Dressing:  Sterile dressing applied      Hand/upper extremity injection    Universal Protocol:  procedure performed by consultantConsent: Verbal consent obtained  Risks and benefits: risks, benefits and alternatives were discussed  Consent given by: patient  Timeout called at: 7/8/2025 1:28 PM.  Patient understanding: patient states understanding of the procedure being performed  Patient consent: the patient's understanding of the procedure matches consent given  Site marked: the operative site was marked  Radiology Images displayed and confirmed. If images not available, report reviewed: imaging studies available  Patient identity confirmed: verbally with patient  Supporting Documentation  Indications: pain   Procedure Details  Condition:lateral epicondylitis Needle size: 25 G  Ultrasound guidance: no  Approach: radial  Medications administered: 1 mL bupivacaine 0.25 %; 6 mg betamethasone acetate-betamethasone sodium phosphate 6 (3-3) mg/mL              Scribe Attestation       I,:  Jes Holbrook am acting as a scribe while in the presence of the attending physician.:       I,:  Abdiel Herbert, DO personally performed the services described in this documentation    as scribed in my presence.:                   [1]   Past Medical History:  Diagnosis Date    Allergic     Anxiety     Arthritis     Asthma     Depression     GERD (gastroesophageal reflux disease)     Hypertension     Inflammatory bowel disease     Obesity     Visual impairment    [2]   Past Surgical History:  Procedure Laterality Date    EYE SURGERY      2021/2022    KNEE SURGERY  2006   [3]   Family History  Problem Relation Name Age of Onset    Diabetes Maternal Grandmother      Diabetes Maternal Grandfather      Cancer Paternal Grandfather     [4]   Social History  Socioeconomic History    Marital status: Single   Tobacco Use    Smoking status: Former     Passive exposure: Never    Smokeless tobacco: Never   Vaping Use    Vaping status: Never Used   Substance and Sexual Activity    Alcohol use: Not Currently     Comment: rare    Drug use: Not Currently     Types: Marijuana     Comment: Medical Marijuana    Sexual activity: Not Currently     Partners: Female     Social Drivers of Health     Food Insecurity: No Food Insecurity (8/9/2024)    Nursing - Inadequate Food Risk Classification     Worried About Running Out of Food in the Last Year: Never true     Ran Out of Food in the Last Year: Never true   Transportation Needs: No Transportation Needs (8/9/2024)    PRAPARE - Transportation     Lack of Transportation (Medical): No     Lack of Transportation (Non-Medical): No    Received from [facility]   Housing Stability: Low Risk  (8/9/2024)    Housing Stability Vital Sign     Unable to Pay for Housing in the Last Year: No     Number of Times Moved in the Last Year: 0     Homeless in the Last Year: No   [5]   Current Outpatient Medications:     albuterol (PROVENTIL HFA,VENTOLIN HFA) 90 mcg/act inhaler,  INHALE 1 PUFF EVERY 4 (FOUR) HOURS AS NEEDED FOR WHEEZING OR SHORTNESS OF BREATH, Disp: 25.5 g, Rfl: 1    amphetamine-dextroamphetamine (ADDERALL) 20 mg tablet, Take 0.5 tablets (10 mg total) by mouth 3 (three) times a day Max Daily Amount: 30 mg, Disp: 90 tablet, Rfl: 0    apixaban (Eliquis) 5 mg, Take 1 tablet (5 mg total) by mouth 2 (two) times a day, Disp: 180 tablet, Rfl: 0    cetirizine (ZyrTEC) 10 mg tablet, TAKE 1 TABLET (10 MG TOTAL) BY MOUTH EVERY MORNING, Disp: 90 tablet, Rfl: 1    diltiazem (CARDIZEM CD) 180 mg 24 hr capsule, Take 1 capsule (180 mg total) by mouth daily, Disp: 90 capsule, Rfl: 1    dorzolamide-timolol (COSOPT) 2-0.5 % ophthalmic solution, Administer 1 drop to both eyes in the morning and 1 drop in the evening., Disp: , Rfl:     famotidine (PEPCID) 40 MG tablet, Take 1 tablet (40 mg total) by mouth every evening, Disp: 90 tablet, Rfl: 1    fluticasone (FLONASE) 50 mcg/act nasal spray, 1 SPRAY INTO EACH NOSTRIL DAILY, Disp: 48 g, Rfl: 1    hydrOXYzine HCL (ATARAX) 25 mg tablet, TAKE 1 TABLET (25 MG TOTAL) BY MOUTH 3 (THREE) TIMES A DAY AS NEEDED FOR ANXIETY, Disp: 270 tablet, Rfl: 1    metoprolol succinate (TOPROL-XL) 50 mg 24 hr tablet, TAKE 1 TABLET (50 MG TOTAL) BY MOUTH DAILY, Disp: 90 tablet, Rfl: 1    Multiple Vitamin (multivitamin) tablet, Take 1 tablet by mouth in the morning., Disp: , Rfl:     naproxen (NAPROSYN) 500 mg tablet, Take 1 tablet (500 mg total) by mouth 2 (two) times a day as needed for moderate pain, Disp: 180 tablet, Rfl: 1    omeprazole (PriLOSEC) 40 MG capsule, Take 1 capsule (40 mg total) by mouth every evening, Disp: 90 capsule, Rfl: 1    Rosuvastatin Calcium 20 MG CPSP, Take 20 mg by mouth in the morning, Disp: 90 capsule, Rfl: 1    Semaglutide-Weight Management (Wegovy) 2.4 MG/0.75ML, Inject 2.4 mg under the skin weekly, Disp: 9 mL, Rfl: 0    valsartan (DIOVAN) 160 mg tablet, TAKE 1 TABLET (160 MG TOTAL) BY MOUTH DAILY, Disp: 90 tablet, Rfl: 1    Wegovy 2.4  MG/0.75ML, INJECT 2.4 MG UNDER THE SKIN WEEKLY, Disp: 3 mL, Rfl: 0    Omega-3 Fatty Acids (fish oil) 1,000 mg, Take 2 capsules (2,000 mg total) by mouth daily, Disp: 60 capsule, Rfl: 11    zolpidem (AMBIEN) 10 mg tablet, Take 1 tablet (10 mg total) by mouth daily at bedtime as needed for sleep Patient has old pill bottle and it has 10mg 1 at bedtime for sleep, Disp: 30 tablet, Rfl: 0

## 2025-07-28 DIAGNOSIS — E66.01 SEVERE OBESITY (HCC): ICD-10-CM

## 2025-07-29 ENCOUNTER — APPOINTMENT (OUTPATIENT)
Dept: URGENT CARE | Facility: CLINIC | Age: 50
End: 2025-07-29

## 2025-07-30 RX ORDER — SEMAGLUTIDE 2.4 MG/.75ML
2.4 INJECTION, SOLUTION SUBCUTANEOUS WEEKLY
Qty: 3 ML | Refills: 5 | Status: SHIPPED | OUTPATIENT
Start: 2025-07-30

## 2025-08-07 ENCOUNTER — TELEPHONE (OUTPATIENT)
Age: 50
End: 2025-08-07

## 2025-08-11 ENCOUNTER — OFFICE VISIT (OUTPATIENT)
Dept: INTERNAL MEDICINE CLINIC | Facility: CLINIC | Age: 50
End: 2025-08-11
Payer: COMMERCIAL